# Patient Record
Sex: FEMALE | Employment: OTHER | URBAN - METROPOLITAN AREA
[De-identification: names, ages, dates, MRNs, and addresses within clinical notes are randomized per-mention and may not be internally consistent; named-entity substitution may affect disease eponyms.]

---

## 2017-01-02 NOTE — PROGRESS NOTES
"  SUBJECTIVE:                                                    Brianna Abdalla is a 36 year old female who presents to clinic today for the following health issues:      ED/UC Followup:    Facility:  [facility]   Date of visit: 12/1/16  Reason for visit: headache   Current Status: still having headaches on and off      Pain located in bilateral temple area and across forehead and sometimes radiates to neck.  Pain is throbbing in nature.   Pt did have nausea with headache on thanksgiving. No vomiting  No prodromal symptoms  Does have sound sensitivity  Takes excedrin as that is the only thing that helps    Pt has had headaches off and on but getting more severe since autumn  Now getting headaches 2x per week for the severe ones and 4 x per week for the mild ones.    Pt under a lot of stress. Anxiety is high. Finds her mood fluctuating all over. Wondering if stress is causing a lot of her headaches. Initially resistant to medication. Had tried lexapro in the past but it made her very sedated. Daughter on prozac and tolerating it well. Pt agreeable to therapy at least for short term. Finds difficulty in finding the time to do therapy.       Will treat anxiety with meds and therapy first and see if headaches improve as anxiety improves.  Pt did have CT scan of head in ER within the last month so not worrisome findings as far as cause for headaches      Problem list and histories reviewed & adjusted, as indicated.  Additional history: as documented    Problem list, Medication list, Allergies, and Medical/Social/Surgical histories reviewed in [facility] and updated as appropriate.    ROS:  Constitutional, HEENT, cardiovascular, pulmonary, gi and gu systems are negative, except as otherwise noted.    OBJECTIVE:                                                    /68 mmHg  Pulse 60  Temp(Src) 96.6  F (35.9  C) (Oral)  Ht 5' 2\" (1.575 m)  Wt 125 lb (56.7 kg)  BMI 22.86 kg/m2  Body mass index is 22.86 kg/(m^2).  GENERAL: " healthy, alert and no distress  NECK: no adenopathy, no asymmetry, masses, or scars and thyroid normal to palpation  RESP: lungs clear to auscultation - no rales, rhonchi or wheezes  CV: regular rate and rhythm, normal S1 S2, no S3 or S4, no murmur, click or rub, no peripheral edema and peripheral pulses strong  ABDOMEN: soft, nontender, no hepatosplenomegaly, no masses and bowel sounds normal  MS: no gross musculoskeletal defects noted, no edema    Diagnostic Test Results:  none      ASSESSMENT/PLAN:                                                            1. Adjustment disorder with anxious mood  As above, is seeing therapist now. Fu in one month  - FLUoxetine (PROZAC) 10 MG capsule; Take 1 capsule (10 mg) by mouth daily  Dispense: 30 capsule; Refill: 1    2. Bilateral headache  Reassess in one month when anxiety/stress hopefully improved. Sooner if not improving/worsening          Kati Briones MD  [facility]

## 2017-01-03 ENCOUNTER — OFFICE VISIT (OUTPATIENT)
Dept: BEHAVIORAL HEALTH | Facility: CLINIC | Age: 37
End: 2017-01-03
Payer: COMMERCIAL

## 2017-01-03 ENCOUNTER — OFFICE VISIT (OUTPATIENT)
Dept: FAMILY MEDICINE | Facility: CLINIC | Age: 37
End: 2017-01-03
Payer: COMMERCIAL

## 2017-01-03 VITALS
HEART RATE: 60 BPM | BODY MASS INDEX: 23 KG/M2 | TEMPERATURE: 96.6 F | DIASTOLIC BLOOD PRESSURE: 68 MMHG | WEIGHT: 125 LBS | SYSTOLIC BLOOD PRESSURE: 111 MMHG | HEIGHT: 62 IN

## 2017-01-03 DIAGNOSIS — R51.9 BILATERAL HEADACHE: ICD-10-CM

## 2017-01-03 DIAGNOSIS — F43.22 ADJUSTMENT DISORDER WITH ANXIOUS MOOD: Primary | ICD-10-CM

## 2017-01-03 DIAGNOSIS — F43.22 ADJUSTMENT DISORDER WITH ANXIOUS MOOD: ICD-10-CM

## 2017-01-03 PROCEDURE — 99207 ZZC NO CHARGE BEHAVIORAL WARM HANDOFF: CPT | Performed by: MARRIAGE & FAMILY THERAPIST

## 2017-01-03 PROCEDURE — 99214 OFFICE O/P EST MOD 30 MIN: CPT | Performed by: FAMILY MEDICINE

## 2017-01-03 RX ORDER — FLUOXETINE 10 MG/1
10 CAPSULE ORAL DAILY
Qty: 30 CAPSULE | Refills: 1 | Status: SHIPPED | OUTPATIENT
Start: 2017-01-03 | End: 2017-04-20

## 2017-01-03 ASSESSMENT — ANXIETY QUESTIONNAIRES
6. BECOMING EASILY ANNOYED OR IRRITABLE: NEARLY EVERY DAY
5. BEING SO RESTLESS THAT IT IS HARD TO SIT STILL: MORE THAN HALF THE DAYS
3. WORRYING TOO MUCH ABOUT DIFFERENT THINGS: NEARLY EVERY DAY
1. FEELING NERVOUS, ANXIOUS, OR ON EDGE: MORE THAN HALF THE DAYS
GAD7 TOTAL SCORE: 19
2. NOT BEING ABLE TO STOP OR CONTROL WORRYING: NEARLY EVERY DAY
IF YOU CHECKED OFF ANY PROBLEMS ON THIS QUESTIONNAIRE, HOW DIFFICULT HAVE THESE PROBLEMS MADE IT FOR YOU TO DO YOUR WORK, TAKE CARE OF THINGS AT HOME, OR GET ALONG WITH OTHER PEOPLE: VERY DIFFICULT
7. FEELING AFRAID AS IF SOMETHING AWFUL MIGHT HAPPEN: NEARLY EVERY DAY

## 2017-01-03 ASSESSMENT — PATIENT HEALTH QUESTIONNAIRE - PHQ9: 5. POOR APPETITE OR OVEREATING: NEARLY EVERY DAY

## 2017-01-03 NOTE — NURSING NOTE
"Chief Complaint   Patient presents with     Hospital F/U     [facility]       Initial /68 mmHg  Pulse 60  Temp(Src) 96.6  F (35.9  C) (Oral)  Ht 5' 2\" (1.575 m)  Wt 125 lb (56.7 kg)  BMI 22.86 kg/m2 Estimated body mass index is 22.86 kg/(m^2) as calculated from the following:    Height as of this encounter: 5' 2\" (1.575 m).    Weight as of this encounter: 125 lb (56.7 kg).  BP completed using cuff size: malvin Bermudez MA      "

## 2017-01-03 NOTE — PROGRESS NOTES
Warm-handoff    BEHAVIORAL HEALTH CLINICIAN introduced and explained integrated health model, brief therapy interventions and referral and support services for ongoing therapy interventions.   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 History of eating disorder, treated. Oldest daughter was treated this year for eating disorder,  has not been supportive or understanding.

## 2017-01-04 ASSESSMENT — ANXIETY QUESTIONNAIRES: GAD7 TOTAL SCORE: 19

## 2017-01-04 ASSESSMENT — PATIENT HEALTH QUESTIONNAIRE - PHQ9: SUM OF ALL RESPONSES TO PHQ QUESTIONS 1-9: 15

## 2017-01-17 ENCOUNTER — OFFICE VISIT (OUTPATIENT)
Dept: BEHAVIORAL HEALTH | Facility: CLINIC | Age: 37
End: 2017-01-17
Payer: COMMERCIAL

## 2017-01-17 DIAGNOSIS — F41.1 GAD (GENERALIZED ANXIETY DISORDER): ICD-10-CM

## 2017-01-17 DIAGNOSIS — F33.1 MAJOR DEPRESSIVE DISORDER, RECURRENT EPISODE, MODERATE (H): Primary | ICD-10-CM

## 2017-01-17 PROCEDURE — 90791 PSYCH DIAGNOSTIC EVALUATION: CPT | Performed by: MARRIAGE & FAMILY THERAPIST

## 2017-01-17 ASSESSMENT — ANXIETY QUESTIONNAIRES
3. WORRYING TOO MUCH ABOUT DIFFERENT THINGS: MORE THAN HALF THE DAYS
7. FEELING AFRAID AS IF SOMETHING AWFUL MIGHT HAPPEN: MORE THAN HALF THE DAYS
5. BEING SO RESTLESS THAT IT IS HARD TO SIT STILL: SEVERAL DAYS
2. NOT BEING ABLE TO STOP OR CONTROL WORRYING: MORE THAN HALF THE DAYS
6. BECOMING EASILY ANNOYED OR IRRITABLE: MORE THAN HALF THE DAYS
GAD7 TOTAL SCORE: 13
1. FEELING NERVOUS, ANXIOUS, OR ON EDGE: MORE THAN HALF THE DAYS

## 2017-01-17 ASSESSMENT — PATIENT HEALTH QUESTIONNAIRE - PHQ9: 5. POOR APPETITE OR OVEREATING: MORE THAN HALF THE DAYS

## 2017-01-30 RX ORDER — FLUOXETINE 10 MG/1
10 CAPSULE ORAL DAILY
Qty: 90 CAPSULE | Refills: 1 | Status: CANCELLED | OUTPATIENT
Start: 2017-01-30

## 2017-01-31 ENCOUNTER — OFFICE VISIT (OUTPATIENT)
Dept: BEHAVIORAL HEALTH | Facility: CLINIC | Age: 37
End: 2017-01-31
Payer: COMMERCIAL

## 2017-01-31 DIAGNOSIS — F41.1 GAD (GENERALIZED ANXIETY DISORDER): Primary | ICD-10-CM

## 2017-01-31 DIAGNOSIS — F33.1 MAJOR DEPRESSIVE DISORDER, RECURRENT EPISODE, MODERATE (H): ICD-10-CM

## 2017-01-31 PROBLEM — F43.22 ADJUSTMENT DISORDER WITH ANXIOUS MOOD: Status: RESOLVED | Noted: 2017-01-03 | Resolved: 2017-01-31

## 2017-01-31 PROCEDURE — 90834 PSYTX W PT 45 MINUTES: CPT | Performed by: MARRIAGE & FAMILY THERAPIST
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
## 2017-02-01 ASSESSMENT — PATIENT HEALTH QUESTIONNAIRE - PHQ9: SUM OF ALL RESPONSES TO PHQ QUESTIONS 1-9: 9

## 2017-02-01 ASSESSMENT — ANXIETY QUESTIONNAIRES: GAD7 TOTAL SCORE: 13

## 2017-02-02 ENCOUNTER — OFFICE VISIT (OUTPATIENT)
Dept: FAMILY MEDICINE | Facility: CLINIC | Age: 37
End: 2017-02-02
Payer: COMMERCIAL

## 2017-02-02 VITALS
TEMPERATURE: 98 F | DIASTOLIC BLOOD PRESSURE: 63 MMHG | HEART RATE: 61 BPM | BODY MASS INDEX: 22.26 KG/M2 | HEIGHT: 62 IN | WEIGHT: 121 LBS | SYSTOLIC BLOOD PRESSURE: 104 MMHG

## 2017-02-02 DIAGNOSIS — F43.22 ADJUSTMENT DISORDER WITH ANXIOUS MOOD: Primary | ICD-10-CM

## 2017-02-02 DIAGNOSIS — G44.209 TENSION HEADACHE: ICD-10-CM

## 2017-02-02 PROCEDURE — 99214 OFFICE O/P EST MOD 30 MIN: CPT | Performed by: FAMILY MEDICINE

## 2017-02-02 ASSESSMENT — ANXIETY QUESTIONNAIRES
1. FEELING NERVOUS, ANXIOUS, OR ON EDGE: MORE THAN HALF THE DAYS
IF YOU CHECKED OFF ANY PROBLEMS ON THIS QUESTIONNAIRE, HOW DIFFICULT HAVE THESE PROBLEMS MADE IT FOR YOU TO DO YOUR WORK, TAKE CARE OF THINGS AT HOME, OR GET ALONG WITH OTHER PEOPLE: SOMEWHAT DIFFICULT
5. BEING SO RESTLESS THAT IT IS HARD TO SIT STILL: MORE THAN HALF THE DAYS
3. WORRYING TOO MUCH ABOUT DIFFERENT THINGS: MORE THAN HALF THE DAYS
6. BECOMING EASILY ANNOYED OR IRRITABLE: MORE THAN HALF THE DAYS
GAD7 TOTAL SCORE: 13
7. FEELING AFRAID AS IF SOMETHING AWFUL MIGHT HAPPEN: SEVERAL DAYS
2. NOT BEING ABLE TO STOP OR CONTROL WORRYING: MORE THAN HALF THE DAYS

## 2017-02-02 ASSESSMENT — PATIENT HEALTH QUESTIONNAIRE - PHQ9: 5. POOR APPETITE OR OVEREATING: MORE THAN HALF THE DAYS

## 2017-02-02 NOTE — PROGRESS NOTES
[facility] [place]      Behavioral Health Clinician Progress Note    Patient Name: Brianna Abdalla           Service Type: Individual      Service Location:   Face to Face in Clinic     Session Start Time: 1:30  Session End Time: 2:20      Session Length: 38 - 52      Attendees: Patient    Visit Activities (Refresh list every visit): [facility] Only    Diagnostic Assessment Date: 1/17/17  Treatment Plan Review Date: By 3rd session   See Flowsheets for today's PHQ-9 and TONO-7 results  Previous PHQ-9:   PHQ-9 SCORE 1/3/2017 1/17/2017   Total Score 15 9     Previous TONO-7:   TONO-7 SCORE 1/3/2017 1/17/2017   Total Score 19 13       CORA LEVEL:  CORA Score (Last Two) 5/11/2011 1/17/2017   CORA Raw Score 39 37   Activation Score 56.4 79.2   CORA Level 3 4       DATA  Extended Session (60+ minutes): No  Interactive Complexity: No  Crisis: No    Treatment Objective(s) Addressed in This Session:  Target Behavior(s): disease management/lifestyle changes anxiety and depression    Depressed Mood: Increase interest, engagement, and pleasure in doing things  Decrease frequency and intensity of feeling down, depressed, hopeless  Improve quantity and quality of night time sleep / decrease daytime naps  Feel less tired and more energy during the day   Improve diet, appetite, mindful eating, and / or meal planning  Identify negative self-talk and behaviors: challenge core beliefs, myths, and actions  Improve concentration, focus, and mindfulness in daily activities   Feel less fidgety, restless or slow in daily activities / interpersonal interactions  Anxiety: will experience a reduction in anxiety, will develop more effective coping skills to manage anxiety symptoms, will develop healthy cognitive patterns and beliefs and will increase ability to function adaptively    Current Stressors / Issues:  Patient reports continued feelings of being overwhelmed. Oldest daughter's 18 birthday this week, and she  "got a card from her paternal grandmother who has not been present in her life. Patient reports she worries about grandmother's motives to reconnect and worries daughter's father may try to reconnect as well. Patient processed balancing her own responses and worries with her daughter having the power to determine how she wants to set these boundaries with paternal relatives and father. Patient processed theme of feeling placed in the middle \"referee\" for her younger daughter and father (patient's fiance). Patient reports her boyfriend/fiance struggles with responding in a deescalating manner to their teenage daughter. Patient reports fiance will often times drop off daughter and his parents house instead of spending time with her; patient feels like parenting is a chore to him.         Progress on Treatment Objective(s) / Homework:  New Objective established this session - ACTION (Actively working towards change); Intervened by reinforcing change plan / affirming steps taken    Also provided psychoeducation about behavioral health condition, symptoms, and treatment options   Patient declined [facility] enrollment.     Cognitive Behavioral Therapy   -Reviewed cognitive re-frames and Practiced balanced thoughts exercise   -Practiced deep breathing/visual imagery exercise   Care Plan review completed: Yes    Medication Review:  No changes to current psychiatric medication(s)    Medication Compliance:  Yes    Changes in Health Issues:   None reported    Chemical Use Review:   Substance Use: Chemical use reviewed, no active concerns identified      Tobacco Use: No current tobacco use.      Assessment: Current Emotional / Mental Status (status of significant symptoms):  Risk status (Self / Other harm or suicidal ideation)  Patient denies a history of suicidal ideation, suicide attempts, self-injurious behavior, homicidal ideation, homicidal behavior and and other safety concerns  Patient denies current fears or concerns for " personal safety.  Patient denies current or recent suicidal ideation or behaviors.  Patient denies current or recent homicidal ideation or behaviors.  Patient denies current or recent self injurious behavior or ideation.  Patient denies other safety concerns.  A safety and risk management plan has not been developed at this time, however patient was encouraged to call [place] [phone] should there be a change in any of these risk factors.    Appearance:   Appropriate   Eye Contact:   Good   Psychomotor Behavior: Normal   Attitude:   Cooperative   Orientation:   All  Speech   Rate / Production: Normal    Volume:  Normal   Mood:    Anxious   Affect:    Worrisome   Thought Content:  Rumination   Thought Form:  Coherent  Logical   Insight:    Good     Diagnoses:  1. TONO (generalized anxiety disorder)    2. Major depressive disorder, recurrent episode, moderate (H)        Collateral Reports Completed:  Not Applicable    Plan: (Homework, other):  Patient was given information about behavioral services and encouraged to schedule a follow up appointment with the clinic [facility] [place] in 1 week.  She was also given Cognitive Behavioral Therapy skills to practice when experiencing anxiety and deep Breathing Strategies to practice when experiencing anxiety.  CD Recommendations: No indications of CD issues.  JAN Nieves, [facility]

## 2017-02-02 NOTE — PROGRESS NOTES
"  SUBJECTIVE:                                                    Brianna Abdalla is a 36 year old female who presents to clinic today for the following health issues:        Depression and Anxiety Follow-Up    Status since last visit: Improved     Other associated symptoms:None    Complicating factors:     Significant life event: No     Current substance abuse: None    PHQ-9 SCORE 1/3/2017 1/17/2017   Total Score 15 9     TONO-7 SCORE 1/3/2017 1/17/2017   Total Score 19 13        PHQ-9  English      PHQ-9   Any Language     GAD7         Amount of exercise or physical activity: 3 days per week     Problems taking medications regularly: No    Medication side effects: none    Diet: regular (no restrictions)    Pt with anxiety. Now on prozac almost one month. Finds she went from up to 2 panic attacks per day to 2 in the past month. TONO 7 went form 15 down to 8.   She is not sure if she wants to stay on prozac at 10 mg or bump up to 20. Did send rx for 20 but will finish out the 10 mg tablets first.  Has slight side effect of nausea but not too bothersome.     Her headaches are still present but much improved. She exercise 2-3 times per week and finds this helps with her anxiety and her headaches.   She will follow up if the headaches do not resolve     Problem list and histories reviewed & adjusted, as indicated.  Additional history: as documented    Problem list, Medication list, Allergies, and Medical/Social/Surgical histories reviewed in [facility] and updated as appropriate.    ROS:  Constitutional, HEENT, cardiovascular, pulmonary, gi and gu systems are negative, except as otherwise noted.    OBJECTIVE:                                                    /63 mmHg  Pulse 61  Temp(Src) 98  F (36.7  C) (Oral)  Ht 5' 2\" (1.575 m)  Wt 121 lb (54.885 kg)  BMI 22.13 kg/m2  Body mass index is 22.13 kg/(m^2).  GENERAL: healthy, alert and no distress  RESP: lungs clear to auscultation - no rales, rhonchi or wheezes  CV: " regular rate and rhythm, normal S1 S2, no S3 or S4, no murmur, click or rub, no peripheral edema and peripheral pulses strong  MS: no gross musculoskeletal defects noted, no edema    Diagnostic Test Results:  none      ASSESSMENT/PLAN:                                                            1. Adjustment disorder with anxious mood  As above, increase porzac to 20, fu via mychart in one month  - FLUoxetine (PROZAC) 20 MG capsule; Take 1 capsule (20 mg) by mouth daily  Dispense: 30 capsule; Refill: 3    2. Tension headache  Improved with improvement of anxiety and with exercise, monitor for further resolution          Kati Briones MD  [facility]

## 2017-02-02 NOTE — NURSING NOTE
"Chief Complaint   Patient presents with     Depression       Initial /63 mmHg  Pulse 61  Temp(Src) 98  F (36.7  C) (Oral)  Ht 5' 2\" (1.575 m)  Wt 121 lb (54.885 kg)  BMI 22.13 kg/m2 Estimated body mass index is 22.13 kg/(m^2) as calculated from the following:    Height as of this encounter: 5' 2\" (1.575 m).    Weight as of this encounter: 121 lb (54.885 kg).  BP completed using cuff size: malvin Bermudez MA      "

## 2017-02-03 ASSESSMENT — ANXIETY QUESTIONNAIRES: GAD7 TOTAL SCORE: 13

## 2017-02-03 ASSESSMENT — PATIENT HEALTH QUESTIONNAIRE - PHQ9: SUM OF ALL RESPONSES TO PHQ QUESTIONS 1-9: 8

## 2017-02-09 ENCOUNTER — OFFICE VISIT (OUTPATIENT)
Dept: BEHAVIORAL HEALTH | Facility: CLINIC | Age: 37
End: 2017-02-09
Payer: COMMERCIAL

## 2017-02-09 DIAGNOSIS — F41.1 GAD (GENERALIZED ANXIETY DISORDER): Primary | ICD-10-CM

## 2017-02-09 DIAGNOSIS — F33.1 MAJOR DEPRESSIVE DISORDER, RECURRENT EPISODE, MODERATE (H): ICD-10-CM

## 2017-02-09 PROCEDURE — 90834 PSYTX W PT 45 MINUTES: CPT | Performed by: MARRIAGE & FAMILY THERAPIST

## 2017-02-09 NOTE — PROGRESS NOTES
[facility]   February 9, 2017      Behavioral Health Clinician Progress Note    Patient Name: Brianna Abdalla           Service Type: Individual      Service Location:   Face to Face in Clinic     Session Start Time: 1:40  Session End Time: 2:10      Session Length: 38 - 52      Attendees: Patient    Visit Activities (Refresh list every visit): [facility] Only    Diagnostic Assessment Date: 1/17/17  Treatment Plan Review Date:   See Flowsheets for today's PHQ-9 and TONO-7 results  Previous PHQ-9:   PHQ-9 SCORE 1/3/2017 1/17/2017 2/2/2017   Total Score 15 9 8     Previous TONO-7:   TONO-7 SCORE 1/3/2017 1/17/2017 2/2/2017   Total Score 19 13 13       CORA LEVEL:  CORA Score (Last Two) 5/11/2011 1/17/2017   CORA Raw Score 39 37   Activation Score 56.4 79.2   CORA Level 3 4       DATA  Extended Session (60+ minutes): No  Interactive Complexity: No  Crisis: No    Treatment Objective(s) Addressed in This Session:  Target Behavior(s): disease management/lifestyle changes anxiety and depression    Depressed Mood: Increase interest, engagement, and pleasure in doing things  Decrease frequency and intensity of feeling down, depressed, hopeless  Improve quantity and quality of night time sleep / decrease daytime naps  Feel less tired and more energy during the day   Improve diet, appetite, mindful eating, and / or meal planning  Identify negative self-talk and behaviors: challenge core beliefs, myths, and actions  Improve concentration, focus, and mindfulness in daily activities   Feel less fidgety, restless or slow in daily activities / interpersonal interactions  Anxiety: will experience a reduction in anxiety, will develop more effective coping skills to manage anxiety symptoms, will develop healthy cognitive patterns and beliefs and will increase ability to function adaptively    Current Stressors / Issues:  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 Patient processed different parenting styles between her and fiance/boyfriend Greg. Patient reports Greg is more hands off and does not interact as much as she does with their daughter. Patient reports Greg and daughter have distant relationship due to this, and patient often times  Is placed in the  role between them.     Progress on Treatment Objective(s) / Homework:  New Objective established this session - ACTION (Actively working towards change); Intervened by reinforcing change plan / affirming steps taken    Also provided psychoeducation about behavioral health condition, symptoms, and treatment options   Patient declined [facility] enrollment.     Cognitive Behavioral Therapy   -Reviewed cognitive re-frames and Practiced balanced thoughts exercise   -Practiced deep breathing/visual imagery exercise   Care Plan review completed: Yes    Medication Review:  No changes to current psychiatric medication(s)    Medication Compliance:  Yes    Changes in Health Issues:   None reported    Chemical Use Review:   Substance Use: Chemical use reviewed, no active concerns identified      Tobacco Use: No current tobacco use.      Assessment: Current Emotional / Mental Status (status of significant symptoms):  Risk status (Self / Other harm or suicidal ideation)  Patient denies a history of suicidal ideation, suicide attempts, self-injurious behavior, homicidal ideation, homicidal behavior and and other safety concerns  Patient denies current fears or concerns for personal safety.  Patient denies current or recent suicidal ideation or behaviors.  Patient denies current or recent homicidal ideation or behaviors.  Patient denies current or recent self injurious behavior or ideation.  Patient denies other safety concerns.  A safety and risk management plan has not been developed at this time, however patient was encouraged to call [facility] should there be a change in any of  these risk factors.    Appearance:   Appropriate   Eye Contact:   Good   Psychomotor Behavior: Normal   Attitude:   Cooperative   Orientation:   All  Speech   Rate / Production: Normal    Volume:  Normal   Mood:    Anxious   Affect:    Worrisome   Thought Content:  Rumination   Thought Form:  Coherent  Logical   Insight:    Good     Diagnoses:  1. TONO (generalized anxiety disorder)    2. Major depressive disorder, recurrent episode, moderate (H)        Collateral Reports Completed:  Not Applicable    Plan: (Homework, other):  Patient was given information about behavioral services and encouraged to schedule a follow up appointment with the clinic [facility] in 1 week.  She was also given Cognitive Behavioral Therapy skills to practice when experiencing anxiety and deep Breathing Strategies to practice when experiencing anxiety.  CD Recommendations: No indications of CD issues.  Annie Jones, JAN, [facility] [place]                                                   Treatment Plan    Client's Name: Brianna Abdalla  YOB: 1980    Date: 2/9/17    DSM-V Diagnoses: 296.32 Major Depressive Disorder, Recurrent Episode, Moderate _ or 300.02 (F41.1) Generalized Anxiety Disorder  Psychosocial / Contextual Factors: None  WHODAS: 16    Referral / Collaboration:  Referral to another professional/service is not indicated at this time..    Anticipated number of session or this episode of care: 6-8      MeasurableTreatment Goal(s) related to diagnosis / functional impairment(s)  Goal 1: Client will decrease symptoms and incorporate healthy and effective coping strategies     I will know I've met my goal when feel less anxious and down.      Objective #A (Client Action)    Client will use thought-stopping strategy daily to reduce intrusive thoughts.  Status: Continued - Date(s):     Intervention(s)  Therapist will teach distraction skills. CBT- Mindfulness/Breathing exercises, Cogntive Re-frames, Negative Automtic Thoughts,  Balnaced Thinking Skills.    Objective #B  Client will identify at least 6 triggers for anxiety.  Status: Continued - Date(s):     Intervention(s)  Therapist will role-play effective communication skills  teach emotional recognition/identification. Emotional Regulation Skills and Healthy Expression of emotions.    Objective #C  Client will Increase interest, engagement, and pleasure in doing things  Decrease frequency and intensity of feeling down, depressed, hopeless  Identify negative self-talk and behaviors: challenge core beliefs, myths, and actions  Improve concentration, focus, and mindfulness in daily activities   Feel less fidgety, restless or slow in daily activities / interpersonal interactions.  Status: Continued - Date(s):     Intervention(s)  Therapist will role-play effective communication skills  teach about healthy boundaries. Assertiveness skills, and conflict resolution skills.        Client has reviewed and agreed to the above plan.      Annie Jones  February 8, 2017

## 2017-02-09 NOTE — MR AVS SNAPSHOT
After Visit Summary   2/9/2017    Brianna Abdalla    MRN: [id number]           Patient Information     Date Of Birth          1980        Visit Information        Provider Department      2/9/2017 1:30 PM Annie Jones [facility]        Today's Diagnoses     TONO (generalized anxiety disorder)    -  1    Major depressive disorder, recurrent episode, moderate (H)           Follow-ups after your visit        Your next 10 appointments already scheduled     Feb 23, 2017  9:00 AM CST   Return Visit with Annie Jones   [facility] ([facility]    [address] [address] [phone]              Who to contact     If you have questions or need follow up information about today's clinic visit or your schedule please contact [facility] directly at [phone].  Normal or non-critical lab and imaging results will be communicated to you by MyChart, letter or phone within 4 business days after the clinic has received the results. If you do not hear from us within 7 days, please contact the clinic through [employer] or phone. If you have a critical or abnormal lab result, we will notify you by phone as soon as possible.  Submit refill requests through [employer] or call your pharmacy and they will forward the refill request to us. Please allow 3 business days for your refill to be completed.          Additional Information About Your Visit        MyChart Information     [employer] gives you secure access to your electronic health record. If you see a primary care provider, you can also send messages to your care team and make appointments. If you have questions, please call your primary care clinic.  If you do not have a primary care provider, please call [phone] and they will assist you.        Care EveryWhere ID     This is your Care EveryWhere ID. This could be used by other organizations to access your [facility]  medical records  [id number]         Blood Pressure from Last 3 Encounters:   02/02/17 104/63   01/03/17 111/68   12/01/16 133/82    Weight from Last 3 Encounters:   02/02/17 54.9 kg (121 lb)   01/03/17 56.7 kg (125 lb)   12/01/16 55.5 kg (122 lb 6.4 oz)              Today, you had the following     No orders found for display       Primary Care Provider Office Phone # Fax #    Kati Alaniz [phone]       [facility] [address] [address] [address]        Thank you!     Thank you for choosing [facility]  for your care. Our goal is always to provide you with excellent care. Hearing back from our patients is one way we can continue to improve our services. Please take a few minutes to complete the written survey that you may receive in the mail after your visit with us. Thank you!             Your Updated Medication List - Protect others around you: Learn how to safely use, store and throw away your medicines at www.disposemymeds.org.          This list is accurate as of: 2/9/17 11:59 PM.  Always use your most recent med list.                   Brand Name Dispense Instructions for use    * FLUoxetine 10 MG capsule    PROzac    30 capsule    Take 1 capsule (10 mg) by mouth daily       * FLUoxetine 20 MG capsule    PROzac    30 capsule    Take 1 capsule (20 mg) by mouth daily       * Notice:  This list has 2 medication(s) that are the same as other medications prescribed for you. Read the directions carefully, and ask your doctor or other care provider to review them with you.

## 2017-02-16 ENCOUNTER — OFFICE VISIT (OUTPATIENT)
Dept: BEHAVIORAL HEALTH | Facility: CLINIC | Age: 37
End: 2017-02-16
Payer: COMMERCIAL

## 2017-02-16 DIAGNOSIS — F33.1 MAJOR DEPRESSIVE DISORDER, RECURRENT EPISODE, MODERATE (H): Primary | ICD-10-CM

## 2017-02-16 DIAGNOSIS — F41.1 GAD (GENERALIZED ANXIETY DISORDER): ICD-10-CM

## 2017-02-16 PROCEDURE — 90834 PSYTX W PT 45 MINUTES: CPT | Performed by: MARRIAGE & FAMILY THERAPIST

## 2017-02-23 ENCOUNTER — OFFICE VISIT (OUTPATIENT)
Dept: BEHAVIORAL HEALTH | Facility: CLINIC | Age: 37
End: 2017-02-23
Payer: COMMERCIAL

## 2017-02-23 DIAGNOSIS — F33.1 MAJOR DEPRESSIVE DISORDER, RECURRENT EPISODE, MODERATE (H): Primary | ICD-10-CM

## 2017-02-23 DIAGNOSIS — F41.1 GAD (GENERALIZED ANXIETY DISORDER): ICD-10-CM

## 2017-02-23 PROCEDURE — 90834 PSYTX W PT 45 MINUTES: CPT | Performed by: MARRIAGE & FAMILY THERAPIST

## 2017-02-23 NOTE — PROGRESS NOTES
[facility]   February 16, 2017      Behavioral Health Clinician Progress Note    Patient Name: Brianna Abdalla           Service Type: Individual      Service Location:   Face to Face in Clinic     Session Start Time: 11:00  Session End Time: 12:00      Session Length: 53 - 60      Attendees: Patient    Visit Activities (Refresh list every visit): [facility] Only    Diagnostic Assessment Date: 1/17/17  Treatment Plan Review Date: 2/9/17  See Flowsheets for today's PHQ-9 and TONO-7 results  Previous PHQ-9:   PHQ-9 SCORE 1/3/2017 1/17/2017 2/2/2017   Total Score 15 9 8     Previous TONO-7:   TONO-7 SCORE 1/3/2017 1/17/2017 2/2/2017   Total Score 19 13 13       CORA LEVEL:  CORA Score (Last Two) 5/11/2011 1/17/2017   CORA Raw Score 39 37   Activation Score 56.4 79.2   CORA Level 3 4       DATA  Extended Session (60+ minutes): No  Interactive Complexity: No  Crisis: No    Treatment Objective(s) Addressed in This Session:  Target Behavior(s): disease management/lifestyle changes anxiety and depression    Depressed Mood: Increase interest, engagement, and pleasure in doing things  Decrease frequency and intensity of feeling down, depressed, hopeless  Improve quantity and quality of night time sleep / decrease daytime naps  Feel less tired and more energy during the day   Improve diet, appetite, mindful eating, and / or meal planning  Identify negative self-talk and behaviors: challenge core beliefs, myths, and actions  Improve concentration, focus, and mindfulness in daily activities   Feel less fidgety, restless or slow in daily activities / interpersonal interactions  Anxiety: will experience a reduction in anxiety, will develop more effective coping skills to manage anxiety symptoms, will develop healthy cognitive patterns and beliefs and will increase ability to function adaptively    Current Stressors / Issues:  Patient reports decreased anxiety symptoms and decreased headaches. Patient reports she is  exercising 3-4 times per week. Patient reports she continues to take medication (Prozac). Patient reports she has noticed decreased worry and ruminating thoughts. Patient reports her oldest daughter Juana wants to do a road trip to [place] with her close friend, and patient is very worried about this and doesn't want daughter to go. Patient processed co-parenting dynamics with Greg. Patient reports Greg is disengaged and does not actively spend a lot of interactions with their daughter Monika. Patient reports last this week Monika became reactive and emotionally upset and said that her father doesn't like her and doesn't like spending time with her. Patient reports when she discussed this later with Greg, he dismissed it as daughter having an emotional outburst.           Progress on Treatment Objective(s) / Homework:  New Objective established this session - ACTION (Actively working towards change); Intervened by reinforcing change plan / affirming steps taken    Also provided psychoeducation about behavioral health condition, symptoms, and treatment options   Patient declined [facility] enrollment.     Cognitive Behavioral Therapy   -Reviewed cognitive re-frames and Practiced balanced thoughts exercise   -Reviewed conflict resolution strategies     Care Plan review completed: Yes    Medication Review:  No changes to current psychiatric medication(s)    Medication Compliance:  Yes    Changes in Health Issues:   None reported    Chemical Use Review:   Substance Use: Chemical use reviewed, no active concerns identified      Tobacco Use: No current tobacco use.      Assessment: Current Emotional / Mental Status (status of significant symptoms):  Risk status (Self / Other harm or suicidal ideation)  Patient denies a history of suicidal ideation, suicide attempts, self-injurious behavior, homicidal ideation, homicidal behavior and and other safety concerns  Patient denies current fears or concerns for personal  safety.  Patient denies current or recent suicidal ideation or behaviors.  Patient denies current or recent homicidal ideation or behaviors.  Patient denies current or recent self injurious behavior or ideation.  Patient denies other safety concerns.  A safety and risk management plan has not been developed at this time, however patient was encouraged to call [place] [phone] should there be a change in any of these risk factors.    Appearance:   Appropriate   Eye Contact:   Good   Psychomotor Behavior: Normal   Attitude:   Cooperative   Orientation:   All  Speech   Rate / Production: Normal    Volume:  Normal   Mood:    Anxious   Affect:    Worrisome   Thought Content:  Rumination   Thought Form:  Coherent  Logical   Insight:    Good     Diagnoses:  1. Major depressive disorder, recurrent episode, moderate (H)    2. TONO (generalized anxiety disorder)        Collateral Reports Completed:  Not Applicable    Plan: (Homework, other):  Patient was given information about behavioral services and encouraged to schedule a follow up appointment with the clinic [facility] in 1 week.  She was also given Cognitive Behavioral Therapy skills to practice when experiencing anxiety and deep Breathing Strategies to practice when experiencing anxiety.  CD Recommendations: No indications of CD issues.  JAN Nieves, [facility]                                                   Treatment Plan    Client's Name: Brianna Abdalla  YOB: 1980    Date: 2/9/17    DSM-V Diagnoses: 296.32 Major Depressive Disorder, Recurrent Episode, Moderate _ or 300.02 (F41.1) Generalized Anxiety Disorder  Psychosocial / Contextual Factors: None  WHODAS: 16    Referral / Collaboration:  Referral to another professional/service is not indicated at this time..    Anticipated number of session or this episode of care: 6-8      MeasurableTreatment Goal(s) related to diagnosis / functional impairment(s)  Goal 1: Client will decrease symptoms and  incorporate healthy and effective coping strategies     I will know I've met my goal when feel less anxious and down.      Objective #A (Client Action)    Client will use thought-stopping strategy daily to reduce intrusive thoughts.  Status: Continued - Date(s):     Intervention(s)  Therapist will teach distraction skills. CBT- Mindfulness/Breathing exercises, Cogntive Re-frames, Negative Automtic Thoughts, Balnaced Thinking Skills.    Objective #B  Client will identify at least 6 triggers for anxiety.  Status: Continued - Date(s):     Intervention(s)  Therapist will role-play effective communication skills  teach emotional recognition/identification. Emotional Regulation Skills and Healthy Expression of emotions.    Objective #C  Client will Increase interest, engagement, and pleasure in doing things  Decrease frequency and intensity of feeling down, depressed, hopeless  Identify negative self-talk and behaviors: challenge core beliefs, myths, and actions  Improve concentration, focus, and mindfulness in daily activities   Feel less fidgety, restless or slow in daily activities / interpersonal interactions.  Status: Continued - Date(s):     Intervention(s)  Therapist will role-play effective communication skills  teach about healthy boundaries. Assertiveness skills, and conflict resolution skills.        Client has reviewed and agreed to the above plan.      Annie Jones  February 8, 2017

## 2017-02-24 NOTE — PROGRESS NOTES
[facility] [place]   February 23 , 2017      Behavioral Health Clinician Progress Note    Patient Name: Brianna Abdalla           Service Type: Individual      Service Location:   Face to Face in Clinic     Session Start Time: 11:00  Session End Time: 12:00      Session Length: 53 - 60      Attendees: Patient    Visit Activities (Refresh list every visit): [facility] Only    Diagnostic Assessment Date: 1/17/17  Treatment Plan Review Date: 2/9/17  See Flowsheets for today's PHQ-9 and TONO-7 results  Previous PHQ-9:   PHQ-9 SCORE 1/3/2017 1/17/2017 2/2/2017   Total Score 15 9 8     Previous TONO-7:   TONO-7 SCORE 1/3/2017 1/17/2017 2/2/2017   Total Score 19 13 13       CORA LEVEL:  CORA Score (Last Two) 5/11/2011 1/17/2017   CORA Raw Score 39 37   Activation Score 56.4 79.2   CORA Level 3 4       DATA  Extended Session (60+ minutes): No  Interactive Complexity: No  Crisis: No    Treatment Objective(s) Addressed in This Session:  Target Behavior(s): disease management/lifestyle changes anxiety and depression    Depressed Mood: Increase interest, engagement, and pleasure in doing things  Decrease frequency and intensity of feeling down, depressed, hopeless  Improve quantity and quality of night time sleep / decrease daytime naps  Feel less tired and more energy during the day   Improve diet, appetite, mindful eating, and / or meal planning  Identify negative self-talk and behaviors: challenge core beliefs, myths, and actions  Improve concentration, focus, and mindfulness in daily activities   Feel less fidgety, restless or slow in daily activities / interpersonal interactions  Anxiety: will experience a reduction in anxiety, will develop more effective coping skills to manage anxiety symptoms, will develop healthy cognitive patterns and beliefs and will increase ability to function adaptively    Current Stressors / Issues:  Patient reports her daughter is still planning to go on road trip to [place] with her friend.  Patient reports she wants to plan a trip for her youngest daughter to [place] during her spring break, as she worries she might not give her enough attention with oldest daughter graduating high school this year. Patient processed uncertainty with her connection with Greg and the future she sees with him. 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          Progress on Treatment Objective(s) / Homework:  New Objective established this session - ACTION (Actively working towards change); Intervened by reinforcing change plan / affirming steps taken    Also provided psychoeducation about behavioral health condition, symptoms, and treatment options   Patient declined [facility] enrollment.     Cognitive Behavioral Therapy   -Sounds and visual imagery mindfulness and breathing exercises     Care Plan review completed: Yes    Medication Review:  No changes to current psychiatric medication(s)    Medication Compliance:  Yes    Changes in Health Issues:   None reported    Chemical Use Review:   Substance Use: Chemical use reviewed, no active concerns identified      Tobacco Use: No current tobacco use.      Assessment: Current Emotional / Mental Status (status of significant symptoms):  Risk status (Self / Other harm or suicidal ideation)  Patient denies a history of suicidal ideation, suicide attempts, self-injurious behavior, homicidal ideation, homicidal behavior and and other safety concerns  Patient denies current fears or concerns for personal safety.  Patient denies current or recent suicidal ideation or behaviors.  Patient denies current or recent homicidal ideation or behaviors.  Patient denies current or recent self injurious behavior or ideation.  Patient denies other safety concerns.  A safety and risk management plan has not been developed at this time, however patient was encouraged to call [facility] should there be a change in any of these risk  factors.    Appearance:   Appropriate   Eye Contact:   Good   Psychomotor Behavior: Normal   Attitude:   Cooperative   Orientation:   All  Speech   Rate / Production: Normal    Volume:  Normal   Mood:    Anxious   Affect:    Worrisome   Thought Content:  Rumination   Thought Form:  Coherent  Logical   Insight:    Good     Diagnoses:  1. Major depressive disorder, recurrent episode, moderate (H)    2. TONO (generalized anxiety disorder)        Collateral Reports Completed:  Not Applicable    Plan: (Homework, other):  Patient was given information about behavioral services and encouraged to schedule a follow up appointment with the clinic [facility] in 1 week.  She was also given Cognitive Behavioral Therapy skills to practice when experiencing anxiety and deep Breathing Strategies to practice when experiencing anxiety.  CD Recommendations: No indications of CD issues.  Annie Jones, JAN, [facility]                                                   Treatment Plan    Client's Name: Brianna Abdalla  YOB: 1980    Date: 2/9/17    DSM-V Diagnoses: 296.32 Major Depressive Disorder, Recurrent Episode, Moderate _ or 300.02 (F41.1) Generalized Anxiety Disorder  Psychosocial / Contextual Factors: None  WHODAS: 16    Referral / Collaboration:  Referral to another professional/service is not indicated at this time..    Anticipated number of session or this episode of care: 6-8      MeasurableTreatment Goal(s) related to diagnosis / functional impairment(s)  Goal 1: Client will decrease symptoms and incorporate healthy and effective coping strategies     I will know I've met my goal when feel less anxious and down.      Objective #A (Client Action)    Client will use thought-stopping strategy daily to reduce intrusive thoughts.  Status: Continued - Date(s):     Intervention(s)  Therapist will teach distraction skills. CBT- Mindfulness/Breathing exercises, Cogntive Re-frames, Negative Automtic Thoughts, Balnaced Thinking  Skills.    Objective #B  Client will identify at least 6 triggers for anxiety.  Status: Continued - Date(s):     Intervention(s)  Therapist will role-play effective communication skills  teach emotional recognition/identification. Emotional Regulation Skills and Healthy Expression of emotions.    Objective #C  Client will Increase interest, engagement, and pleasure in doing things  Decrease frequency and intensity of feeling down, depressed, hopeless  Identify negative self-talk and behaviors: challenge core beliefs, myths, and actions  Improve concentration, focus, and mindfulness in daily activities   Feel less fidgety, restless or slow in daily activities / interpersonal interactions.  Status: Continued - Date(s):     Intervention(s)  Therapist will role-play effective communication skills  teach about healthy boundaries. Assertiveness skills, and conflict resolution skills.        Client has reviewed and agreed to the above plan.      Annie Jones  February 8, 2017

## 2017-03-02 ENCOUNTER — TELEPHONE (OUTPATIENT)
Dept: FAMILY MEDICINE | Facility: CLINIC | Age: 37
End: 2017-03-02

## 2017-03-02 DIAGNOSIS — F41.1 GAD (GENERALIZED ANXIETY DISORDER): ICD-10-CM

## 2017-03-02 DIAGNOSIS — F33.1 MAJOR DEPRESSIVE DISORDER, RECURRENT EPISODE, MODERATE (H): Primary | ICD-10-CM

## 2017-03-02 RX ORDER — FLUOXETINE 10 MG/1
30 CAPSULE ORAL DAILY
Qty: 270 CAPSULE | Refills: 1 | Status: SHIPPED | OUTPATIENT
Start: 2017-03-02 | End: 2017-04-20

## 2017-03-02 NOTE — TELEPHONE ENCOUNTER
Patient is calling with questions about increasing dosage on RX: FLUoxetine (PROZAC) 20 MG capsule. Please call to advise. Thank  You.

## 2017-03-02 NOTE — TELEPHONE ENCOUNTER
Bumped her dose to 40 mg otherwise she is going to have 2 copays per month.  Rx has been sent    Kati Briones

## 2017-03-02 NOTE — TELEPHONE ENCOUNTER
Patient called back and informed her of providers message.   She said she just got a call from her pharmacy that her insurance will not cover Prozac at 3 tablets daily.    They will cover Prozac 20 mg 1 tablet daily with Prozac 10 mg daily but a PA will need to be done for the original order.     Routing to PCP to advise on whether PA should be started or the 2 strengths can be ordered.    Sherice Toledo RN

## 2017-03-02 NOTE — TELEPHONE ENCOUNTER
When did she increase it to 20 mg?  It takes about one month to kick in. If she just did in the past 2-3 weeks then I would have her hold tight at current dose.  If it has been closer to one month, okay to increase it to 30 mg and have her fu in one month    Kati Briones

## 2017-03-02 NOTE — TELEPHONE ENCOUNTER
rx sent. She can finish up the 20 mg tablets but taking a 20 and 10 til 20s are gone, then continue with 10 mg x3 tablets a day.    Kati Briones

## 2017-03-02 NOTE — TELEPHONE ENCOUNTER
Patient is currently taking 20mg daily. Patient now feeling need to increase due to increased stress and anxiety.  Has a very stressful situation coming up in about a week and a half.  Also patient states was discussed increasing the dose about a week and a half before menses. To provider to advise..Jo-Ann GUSMANN, RN, CPN

## 2017-03-02 NOTE — TELEPHONE ENCOUNTER
Patient has been on the 20mg since 2/2. Would like increase to 30mg. Please send prescription..Jo-Ann GUSMANN, RN, CPN

## 2017-03-02 NOTE — TELEPHONE ENCOUNTER
Patient informed of provider note as below and prescription sent to pharmacy..Jo-Ann GUSMANN, RN, CPN

## 2017-03-02 NOTE — TELEPHONE ENCOUNTER
Left message on voicemail for patient to call back.  Direct number given.     Sherice Toledo, RN

## 2017-03-09 ENCOUNTER — OFFICE VISIT (OUTPATIENT)
Dept: BEHAVIORAL HEALTH | Facility: CLINIC | Age: 37
End: 2017-03-09
Payer: COMMERCIAL

## 2017-03-09 DIAGNOSIS — F41.1 GAD (GENERALIZED ANXIETY DISORDER): Primary | ICD-10-CM

## 2017-03-09 DIAGNOSIS — F33.1 MAJOR DEPRESSIVE DISORDER, RECURRENT EPISODE, MODERATE (H): ICD-10-CM

## 2017-03-09 PROCEDURE — 90834 PSYTX W PT 45 MINUTES: CPT | Performed by: MARRIAGE & FAMILY THERAPIST

## 2017-03-09 NOTE — MR AVS SNAPSHOT
After Visit Summary   3/9/2017    Brianna Abdalla    MRN: [id number]           Patient Information     Date Of Birth          1980        Visit Information        Provider Department      3/9/2017 2:00 PM Annie Jones [facility]        Today's Diagnoses     TONO (generalized anxiety disorder)    -  1    Major depressive disorder, recurrent episode, moderate (H)           Follow-ups after your visit        Who to contact     If you have questions or need follow up information about today's clinic visit or your schedule please contact [facility] directly at [phone].  Normal or non-critical lab and imaging results will be communicated to you by Lightning Labhart, letter or phone within 4 business days after the clinic has received the results. If you do not hear from us within 7 days, please contact the clinic through Speakaboost or phone. If you have a critical or abnormal lab result, we will notify you by phone as soon as possible.  Submit refill requests through [employer] or call your pharmacy and they will forward the refill request to us. Please allow 3 business days for your refill to be completed.          Additional Information About Your Visit        MyChart Information     [employer] gives you secure access to your electronic health record. If you see a primary care provider, you can also send messages to your care team and make appointments. If you have questions, please call your primary care clinic.  If you do not have a primary care provider, please call [phone] and they will assist you.        Care EveryWhere ID     This is your Care EveryWhere ID. This could be used by other organizations to access your [place] medical records  [id number]         Blood Pressure from Last 3 Encounters:   02/02/17 104/63   01/03/17 111/68   12/01/16 133/82    Weight from Last 3 Encounters:   02/02/17 54.9 kg (121 lb)   01/03/17 56.7 kg (125 lb)   12/01/16 55.5 kg (122 lb  6.4 oz)              Today, you had the following     No orders found for display       Primary Care Provider Office Phone # Fax #    Kati Alaniz [phone]       [facility] [address] [address] [facility] [address]        Thank you!     Thank you for choosing [facility]  for your care. Our goal is always to provide you with excellent care. Hearing back from our patients is one way we can continue to improve our services. Please take a few minutes to complete the written survey that you may receive in the mail after your visit with us. Thank you!             Your Updated Medication List - Protect others around you: Learn how to safely use, store and throw away your medicines at www.disposemymeds.org.          This list is accurate as of: 3/9/17 11:59 PM.  Always use your most recent med list.                   Brand Name Dispense Instructions for use    * FLUoxetine 10 MG capsule    PROzac    30 capsule    Take 1 capsule (10 mg) by mouth daily       * FLUoxetine 20 MG capsule    PROzac    30 capsule    Take 1 capsule (20 mg) by mouth daily       * FLUoxetine 10 MG capsule    PROzac    270 capsule    Take 3 capsules (30 mg) by mouth daily       * FLUoxetine 20 MG capsule    PROzac    90 capsule    Take 2 capsules (40 mg) by mouth daily       * Notice:  This list has 4 medication(s) that are the same as other medications prescribed for you. Read the directions carefully, and ask your doctor or other care provider to review them with you.

## 2017-03-14 ENCOUNTER — OFFICE VISIT (OUTPATIENT)
Dept: BEHAVIORAL HEALTH | Facility: CLINIC | Age: 37
End: 2017-03-14
Payer: COMMERCIAL

## 2017-03-14 DIAGNOSIS — F33.1 MAJOR DEPRESSIVE DISORDER, RECURRENT EPISODE, MODERATE (H): ICD-10-CM

## 2017-03-14 DIAGNOSIS — F41.1 GAD (GENERALIZED ANXIETY DISORDER): Primary | ICD-10-CM

## 2017-03-14 PROCEDURE — 90834 PSYTX W PT 45 MINUTES: CPT | Performed by: MARRIAGE & FAMILY THERAPIST

## 2017-03-14 NOTE — MR AVS SNAPSHOT
After Visit Summary   3/14/2017    Brianna Abdalla    MRN: [id number]           Patient Information     Date Of Birth          1980        Visit Information        Provider Department      3/14/2017 10:00 AM Annie Jones [facility]        Today's Diagnoses     TONO (generalized anxiety disorder)    -  1    Major depressive disorder, recurrent episode, moderate (H)           Follow-ups after your visit        Who to contact     If you have questions or need follow up information about today's clinic visit or your schedule please contact [facility] directly at [phone].  Normal or non-critical lab and imaging results will be communicated to you by [employer] Powerhart, letter or phone within 4 business days after the clinic has received the results. If you do not hear from us within 7 days, please contact the clinic through [employer] or phone. If you have a critical or abnormal lab result, we will notify you by phone as soon as possible.  Submit refill requests through [employer] or call your pharmacy and they will forward the refill request to us. Please allow 3 business days for your refill to be completed.          Additional Information About Your Visit        MyChart Information     [employer] gives you secure access to your electronic health record. If you see a primary care provider, you can also send messages to your care team and make appointments. If you have questions, please call your primary care clinic.  If you do not have a primary care provider, please call [phone] and they will assist you.        Care EveryWhere ID     This is your Care EveryWhere ID. This could be used by other organizations to access your [place] medical records  [id number]         Blood Pressure from Last 3 Encounters:   02/02/17 104/63   01/03/17 111/68   12/01/16 133/82    Weight from Last 3 Encounters:   02/02/17 54.9 kg (121 lb)   01/03/17 56.7 kg (125 lb)   12/01/16 55.5 kg (122  lb 6.4 oz)              Today, you had the following     No orders found for display       Primary Care Provider Office Phone # Fax #    Kati Alaniz [phone]       [facility] [address] [address] [facility] [address]        Thank you!     Thank you for choosing [facility]  for your care. Our goal is always to provide you with excellent care. Hearing back from our patients is one way we can continue to improve our services. Please take a few minutes to complete the written survey that you may receive in the mail after your visit with us. Thank you!             Your Updated Medication List - Protect others around you: Learn how to safely use, store and throw away your medicines at www.disposemymeds.org.          This list is accurate as of: 3/14/17 11:59 PM.  Always use your most recent med list.                   Brand Name Dispense Instructions for use    * FLUoxetine 10 MG capsule    PROzac    30 capsule    Take 1 capsule (10 mg) by mouth daily       * FLUoxetine 20 MG capsule    PROzac    30 capsule    Take 1 capsule (20 mg) by mouth daily       * FLUoxetine 10 MG capsule    PROzac    270 capsule    Take 3 capsules (30 mg) by mouth daily       * FLUoxetine 20 MG capsule    PROzac    90 capsule    Take 2 capsules (40 mg) by mouth daily       * Notice:  This list has 4 medication(s) that are the same as other medications prescribed for you. Read the directions carefully, and ask your doctor or other care provider to review them with you.

## 2017-03-14 NOTE — PROGRESS NOTES
[facility] [place]   March 9 , 2017      Behavioral Health Clinician Progress Note    Patient Name: Brianna Abdalla           Service Type: Individual      Service Location:   Face to Face in Clinic     Session Start Time: 2:00  Session End Time: 3:00      Session Length: 53 - 60      Attendees: Patient    Visit Activities (Refresh list every visit): [facility] Only    Diagnostic Assessment Date: 1/17/17  Treatment Plan Review Date: 2/9/17  See Flowsheets for today's PHQ-9 and TONO-7 results  Previous PHQ-9:   PHQ-9 SCORE 1/3/2017 1/17/2017 2/2/2017   Total Score 15 9 8     Previous TONO-7:   TONO-7 SCORE 1/3/2017 1/17/2017 2/2/2017   Total Score 19 13 13       CORA LEVEL:  CORA Score (Last Two) 5/11/2011 1/17/2017   CORA Raw Score 39 37   Activation Score 56.4 79.2   CORA Level 3 4       DATA  Extended Session (60+ minutes): No  Interactive Complexity: No  Crisis: No    Treatment Objective(s) Addressed in This Session:  Target Behavior(s): disease management/lifestyle changes anxiety and depression    Depressed Mood: Increase interest, engagement, and pleasure in doing things  Decrease frequency and intensity of feeling down, depressed, hopeless  Improve quantity and quality of night time sleep / decrease daytime naps  Feel less tired and more energy during the day   Improve diet, appetite, mindful eating, and / or meal planning  Identify negative self-talk and behaviors: challenge core beliefs, myths, and actions  Improve concentration, focus, and mindfulness in daily activities   Feel less fidgety, restless or slow in daily activities / interpersonal interactions  Anxiety: will experience a reduction in anxiety, will develop more effective coping skills to manage anxiety symptoms, will develop healthy cognitive patterns and beliefs and will increase ability to function adaptively    Current Stressors / Issues:  Patient reports her oldest daughter left this morning to go on road trip to [place] with her friend.  Patient reports she planned a trip to [place] with her younger daughter for her spring break next week. Patient reports increased anxiety related to worrying about safety of her daughter on her road trip. 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 Patient processed emotional impacts of boyfriend Greg being disengaged with their family and patient not feeling they are connected as a couple. Patient reports Greg forgot that patient's daughter Juana had left this morning for her trip and did not offer any additional support or encouragement to patient as she was very anxious.           Progress on Treatment Objective(s) / Homework:  New Objective established this session - ACTION (Actively working towards change); Intervened by reinforcing change plan / affirming steps taken    Also provided psychoeducation about behavioral health condition, symptoms, and treatment options     Cognitive Behavioral Therapy   -Mindfulness imagery exercise, Cognitive re-frames     Care Plan review completed: Yes    Medication Review:  No changes to current psychiatric medication(s)    Medication Compliance:  Yes    Changes in Health Issues:   None reported    Chemical Use Review:   Substance Use: Chemical use reviewed, no active concerns identified      Tobacco Use: No current tobacco use.      Assessment: Current Emotional / Mental Status (status of significant symptoms):  Risk status (Self / Other harm or suicidal ideation)  Patient denies a history of suicidal ideation, suicide attempts, self-injurious behavior, homicidal ideation, homicidal behavior and and other safety concerns  Patient denies current fears or concerns for personal safety.  Patient denies current or recent suicidal ideation or behaviors.  Patient denies current or recent homicidal ideation or behaviors.  Patient denies current or recent self  injurious behavior or ideation.  Patient denies other safety concerns.  A safety and risk management plan has not been developed at this time, however patient was encouraged to call [place] [phone] should there be a change in any of these risk factors.    Appearance:   Appropriate   Eye Contact:   Good   Psychomotor Behavior: Normal   Attitude:   Cooperative   Orientation:   All  Speech   Rate / Production: Normal    Volume:  Normal   Mood:    Anxious   Affect:    Worrisome   Thought Content:  Rumination   Thought Form:  Coherent  Logical   Insight:    Good     Diagnoses:  1. TONO (generalized anxiety disorder)    2. Major depressive disorder, recurrent episode, moderate (H)        Collateral Reports Completed:  Not Applicable    Plan: (Homework, other):  Patient was given information about behavioral services and encouraged to schedule a follow up appointment with the clinic [facility] in 1 week.  She was also given Cognitive Behavioral Therapy skills to practice when experiencing anxiety and deep Breathing Strategies to practice when experiencing anxiety.  CD Recommendations: No indications of CD issues.  JAN Nieves, [facility]                                                   Treatment Plan    Client's Name: Brianna Abdalla  YOB: 1980    Date: 2/9/17    DSM-V Diagnoses: 296.32 Major Depressive Disorder, Recurrent Episode, Moderate _ or 300.02 (F41.1) Generalized Anxiety Disorder  Psychosocial / Contextual Factors: None  WHODAS: 16    Referral / Collaboration:  Referral to another professional/service is not indicated at this time..    Anticipated number of session or this episode of care: 6-8      MeasurableTreatment Goal(s) related to diagnosis / functional impairment(s)  Goal 1: Client will decrease symptoms and incorporate healthy and effective coping strategies     I will know I've met my goal when feel less anxious and down.      Objective #A (Client Action)    Client will use  thought-stopping strategy daily to reduce intrusive thoughts.  Status: Continued - Date(s):     Intervention(s)  Therapist will teach distraction skills. CBT- Mindfulness/Breathing exercises, Cogntive Re-frames, Negative Automtic Thoughts, Balnaced Thinking Skills.    Objective #B  Client will identify at least 6 triggers for anxiety.  Status: Continued - Date(s):     Intervention(s)  Therapist will role-play effective communication skills  teach emotional recognition/identification. Emotional Regulation Skills and Healthy Expression of emotions.    Objective #C  Client will Increase interest, engagement, and pleasure in doing things  Decrease frequency and intensity of feeling down, depressed, hopeless  Identify negative self-talk and behaviors: challenge core beliefs, myths, and actions  Improve concentration, focus, and mindfulness in daily activities   Feel less fidgety, restless or slow in daily activities / interpersonal interactions.  Status: Continued - Date(s):     Intervention(s)  Therapist will role-play effective communication skills  teach about healthy boundaries. Assertiveness skills, and conflict resolution skills.        Client has reviewed and agreed to the above plan.      Annie Jones  February 8, 2017

## 2017-03-16 NOTE — PROGRESS NOTES
[facility]   March 14 , 2017      Behavioral Health Clinician Progress Note    Patient Name: Brianna Abdalla           Service Type: Individual      Service Location:   Face to Face in Clinic     Session Start Time: 10:00  Session End Time: 11:00      Session Length: 53 - 60      Attendees: Patient    Visit Activities (Refresh list every visit): [facility] Only    Diagnostic Assessment Date: 1/17/17  Treatment Plan Review Date: 2/9/17  See Flowsheets for today's PHQ-9 and TONO-7 results  Previous PHQ-9:   PHQ-9 SCORE 1/3/2017 1/17/2017 2/2/2017   Total Score 15 9 8     Previous TONO-7:   TONO-7 SCORE 1/3/2017 1/17/2017 2/2/2017   Total Score 19 13 13       CORA LEVEL:  CORA Score (Last Two) 5/11/2011 1/17/2017   CORA Raw Score 39 37   Activation Score 56.4 79.2   CORA Level 3 4       DATA  Extended Session (60+ minutes): No  Interactive Complexity: No  Crisis: No    Treatment Objective(s) Addressed in This Session:  Target Behavior(s): disease management/lifestyle changes anxiety and depression    Depressed Mood: Increase interest, engagement, and pleasure in doing things  Decrease frequency and intensity of feeling down, depressed, hopeless  Improve quantity and quality of night time sleep / decrease daytime naps  Feel less tired and more energy during the day   Improve diet, appetite, mindful eating, and / or meal planning  Identify negative self-talk and behaviors: challenge core beliefs, myths, and actions  Improve concentration, focus, and mindfulness in daily activities   Feel less fidgety, restless or slow in daily activities / interpersonal interactions  Anxiety: will experience a reduction in anxiety, will develop more effective coping skills to manage anxiety symptoms, will develop healthy cognitive patterns and beliefs and will increase ability to function adaptively    Current Stressors / Issues:  Patient reports daughter is out of town and patient continues to cope with her anxiety.  Patient reports she was able to sleep and get through the night and into the next day without hearing back from daughter. Patient reports she and younger daughter leave for [place] later today. Patient reports Greg informed her he took time off from work while they are gone, which bothered and confused patient as she had asked him to join them but he told her he didn't have enough time off from work. Patient processed ongoing disconnect with Greg and her worry about the future of their relationship. Patient processed relationship and interactions with co-worker who she is also friends with.         Progress on Treatment Objective(s) / Homework:  New Objective established this session - ACTION (Actively working towards change); Intervened by reinforcing change plan / affirming steps taken    Also provided psychoeducation about behavioral health condition, symptoms, and treatment options     Cognitive Behavioral Therapy   -Establishing healthy boundaries with friends and co-workers  -Mindfulness and distraction exercises   -Cognitive re-frames and recognizing cognitive distortions     Care Plan review completed: Yes    Medication Review:  No changes to current psychiatric medication(s)    Medication Compliance:  Yes    Changes in Health Issues:   None reported    Chemical Use Review:   Substance Use: Chemical use reviewed, no active concerns identified      Tobacco Use: No current tobacco use.      Assessment: Current Emotional / Mental Status (status of significant symptoms):  Risk status (Self / Other harm or suicidal ideation)  Patient denies a history of suicidal ideation, suicide attempts, self-injurious behavior, homicidal ideation, homicidal behavior and and other safety concerns  Patient denies current fears or concerns for personal safety.  Patient denies current or recent suicidal ideation or behaviors.  Patient denies current or recent homicidal ideation or behaviors.  Patient denies current or recent  self injurious behavior or ideation.  Patient denies other safety concerns.  A safety and risk management plan has not been developed at this time, however patient was encouraged to call [place] [phone] should there be a change in any of these risk factors.    Appearance:   Appropriate   Eye Contact:   Good   Psychomotor Behavior: Normal   Attitude:   Cooperative   Orientation:   All  Speech   Rate / Production: Normal    Volume:  Normal   Mood:    Anxious   Affect:    Worrisome   Thought Content:  Rumination   Thought Form:  Coherent  Logical   Insight:    Good     Diagnoses:  1. TONO (generalized anxiety disorder)    2. Major depressive disorder, recurrent episode, moderate (H)        Collateral Reports Completed:  Not Applicable    Plan: (Homework, other):  Patient was given information about behavioral services and encouraged to schedule a follow up appointment with the clinic [facility] in 1 week.  She was also given Cognitive Behavioral Therapy skills to practice when experiencing anxiety and deep Breathing Strategies to practice when experiencing anxiety.  CD Recommendations: No indications of CD issues.  JAN Nieves, [facility]                                                   Treatment Plan    Client's Name: Brianna Abdalla  YOB: 1980    Date: 2/9/17    DSM-V Diagnoses: 296.32 Major Depressive Disorder, Recurrent Episode, Moderate _ or 300.02 (F41.1) Generalized Anxiety Disorder  Psychosocial / Contextual Factors: None  WHODAS: 16    Referral / Collaboration:  Referral to another professional/service is not indicated at this time..    Anticipated number of session or this episode of care: 6-8      MeasurableTreatment Goal(s) related to diagnosis / functional impairment(s)  Goal 1: Client will decrease symptoms and incorporate healthy and effective coping strategies     I will know I've met my goal when feel less anxious and down.      Objective #A (Client Action)    Client will use  thought-stopping strategy daily to reduce intrusive thoughts.  Status: Continued - Date(s):     Intervention(s)  Therapist will teach distraction skills. CBT- Mindfulness/Breathing exercises, Cogntive Re-frames, Negative Automtic Thoughts, Balnaced Thinking Skills.    Objective #B  Client will identify at least 6 triggers for anxiety.  Status: Continued - Date(s):     Intervention(s)  Therapist will role-play effective communication skills  teach emotional recognition/identification. Emotional Regulation Skills and Healthy Expression of emotions.    Objective #C  Client will Increase interest, engagement, and pleasure in doing things  Decrease frequency and intensity of feeling down, depressed, hopeless  Identify negative self-talk and behaviors: challenge core beliefs, myths, and actions  Improve concentration, focus, and mindfulness in daily activities   Feel less fidgety, restless or slow in daily activities / interpersonal interactions.  Status: Continued - Date(s):     Intervention(s)  Therapist will role-play effective communication skills  teach about healthy boundaries. Assertiveness skills, and conflict resolution skills.        Client has reviewed and agreed to the above plan.      Annie Jones  February 8, 2017

## 2017-04-14 ENCOUNTER — OFFICE VISIT (OUTPATIENT)
Dept: BEHAVIORAL HEALTH | Facility: CLINIC | Age: 37
End: 2017-04-14
Payer: COMMERCIAL

## 2017-04-14 DIAGNOSIS — F33.1 MAJOR DEPRESSIVE DISORDER, RECURRENT EPISODE, MODERATE (H): Primary | ICD-10-CM

## 2017-04-14 DIAGNOSIS — F41.1 GAD (GENERALIZED ANXIETY DISORDER): ICD-10-CM

## 2017-04-14 PROCEDURE — 90834 PSYTX W PT 45 MINUTES: CPT | Performed by: MARRIAGE & FAMILY THERAPIST

## 2017-04-14 NOTE — MR AVS SNAPSHOT
After Visit Summary   4/14/2017    Brianna Abdalla    MRN: [id number]           Patient Information     Date Of Birth          1980        Visit Information        Provider Department      4/14/2017 1:30 PM Annie Jones [facility]        Today's Diagnoses     Major depressive disorder, recurrent episode, moderate (H)    -  1    TONO (generalized anxiety disorder)           Follow-ups after your visit        Your next 10 appointments already scheduled     May 02, 2017  4:00 PM CDT   Return Visit with Annie Jones   [facility]    [address] [address] [phone]              Who to contact     If you have questions or need follow up information about today's clinic visit or your schedule please contact [facility] directly at [phone].  Normal or non-critical lab and imaging results will be communicated to you by MyChart, letter or phone within 4 business days after the clinic has received the results. If you do not hear from us within 7 days, please contact the clinic through [employer] or phone. If you have a critical or abnormal lab result, we will notify you by phone as soon as possible.  Submit refill requests through [employer] or call your pharmacy and they will forward the refill request to us. Please allow 3 business days for your refill to be completed.          Additional Information About Your Visit        MyChart Information     [employer] gives you secure access to your electronic health record. If you see a primary care provider, you can also send messages to your care team and make appointments. If you have questions, please call your primary care clinic.  If you do not have a primary care provider, please call [phone] and they will assist you.        Care EveryWhere ID     This is your Care EveryWhere ID. This could be used by other organizations to access your  [place] medical records  [id number]         Blood Pressure from Last 3 Encounters:   04/20/17 105/69   02/02/17 104/63   01/03/17 111/68    Weight from Last 3 Encounters:   04/20/17 55.2 kg (121 lb 12.8 oz)   02/02/17 54.9 kg (121 lb)   01/03/17 56.7 kg (125 lb)              Today, you had the following     No orders found for display       Primary Care Provider Office Phone # Fax #    Kati Alaniz [phone]       [facility] [address] [facility] [address]        Thank you!     Thank you for choosing [facility]  for your care. Our goal is always to provide you with excellent care. Hearing back from our patients is one way we can continue to improve our services. Please take a few minutes to complete the written survey that you may receive in the mail after your visit with us. Thank you!             Your Updated Medication List - Protect others around you: Learn how to safely use, store and throw away your medicines at www.disposemymeds.org.          This list is accurate as of: 4/14/17 11:59 PM.  Always use your most recent med list.                   Brand Name Dispense Instructions for use    FLUoxetine 20 MG capsule    PROzac    90 capsule    Take 2 capsules (40 mg) by mouth daily

## 2017-04-20 ENCOUNTER — OFFICE VISIT (OUTPATIENT)
Dept: OBGYN | Facility: CLINIC | Age: 37
End: 2017-04-20
Payer: COMMERCIAL

## 2017-04-20 VITALS
HEIGHT: 61 IN | SYSTOLIC BLOOD PRESSURE: 105 MMHG | BODY MASS INDEX: 23 KG/M2 | DIASTOLIC BLOOD PRESSURE: 69 MMHG | HEART RATE: 68 BPM | WEIGHT: 121.8 LBS | TEMPERATURE: 98.1 F

## 2017-04-20 DIAGNOSIS — R30.0 DYSURIA: Primary | ICD-10-CM

## 2017-04-20 LAB
ALBUMIN UR-MCNC: NEGATIVE MG/DL
APPEARANCE UR: CLEAR
BILIRUB UR QL STRIP: NEGATIVE
COLOR UR AUTO: YELLOW
GLUCOSE UR STRIP-MCNC: NEGATIVE MG/DL
HGB UR QL STRIP: NEGATIVE
KETONES UR STRIP-MCNC: NEGATIVE MG/DL
LEUKOCYTE ESTERASE UR QL STRIP: NEGATIVE
MICRO REPORT STATUS: NORMAL
NITRATE UR QL: NEGATIVE
NON-SQ EPI CELLS #/AREA URNS LPF: NORMAL /LPF
PH UR STRIP: 7 PH (ref 5–7)
RBC #/AREA URNS AUTO: NORMAL /HPF (ref 0–2)
SP GR UR STRIP: 1.01 (ref 1–1.03)
SPECIMEN SOURCE: NORMAL
URN SPEC COLLECT METH UR: NORMAL
UROBILINOGEN UR STRIP-ACNC: 0.2 EU/DL (ref 0.2–1)
WBC #/AREA URNS AUTO: NORMAL /HPF (ref 0–2)
WET PREP SPEC: NORMAL

## 2017-04-20 PROCEDURE — 87591 N.GONORRHOEAE DNA AMP PROB: CPT | Performed by: NURSE PRACTITIONER

## 2017-04-20 PROCEDURE — 87491 CHLMYD TRACH DNA AMP PROBE: CPT | Performed by: NURSE PRACTITIONER

## 2017-04-20 PROCEDURE — 99203 OFFICE O/P NEW LOW 30 MIN: CPT | Performed by: NURSE PRACTITIONER

## 2017-04-20 PROCEDURE — 81001 URINALYSIS AUTO W/SCOPE: CPT | Performed by: NURSE PRACTITIONER

## 2017-04-20 PROCEDURE — 87086 URINE CULTURE/COLONY COUNT: CPT | Performed by: NURSE PRACTITIONER

## 2017-04-20 PROCEDURE — 87210 SMEAR WET MOUNT SALINE/INK: CPT | Performed by: NURSE PRACTITIONER

## 2017-04-20 ASSESSMENT — PAIN SCALES - GENERAL: PAINLEVEL: MODERATE PAIN (5)

## 2017-04-20 NOTE — NURSING NOTE
"Chief Complaint   Patient presents with     UTI     Cloudy/blood  in urine x 4 days       Initial /69  Pulse 68  Temp 98.1  F (36.7  C) (Oral)  Ht 5' 1\" (1.549 m)  Wt 121 lb 12.8 oz (55.2 kg)  LMP 03/31/2017 (Approximate)  BMI 23.01 kg/m2 Estimated body mass index is 23.01 kg/(m^2) as calculated from the following:    Height as of this encounter: 5' 1\" (1.549 m).    Weight as of this encounter: 121 lb 12.8 oz (55.2 kg)..  BP completed using cuff size: malvin Ramos CMA    "

## 2017-04-20 NOTE — PROGRESS NOTES
S: Brianna is a 36 year old  2 para 2 presenting today with concerns of possible blood in her urine and possibly spotting. Noticed symptoms began Monday and had 1 episode of spotting and has been having intermittent cloudy urine with a little bit of darker blood in it since then. Not every time she urinates. Also having dysuria-but not every time. Denies frequency, urgency, low back pain, voiding in small amounts. LMP was 3/31/17 and using nothing for contraception. Denies abnormal vaginal discharge, itching, odor. No STI concerns. No recent changes in products, bubble baths. Cycles regular, monthly. Last few days. No heavy flow, no intramenstrual bleeding. Patient medical, surgical, social, and family history reviewed and updated at today's visit. ROS: 10 point ROS neg other than the symptoms noted above in the HPI.    O: This is a well appearing female in no acute distress. Answers questions and maintains eye contact appropriately. Vital signs noted.  RESPIRATORY: Clear to auscultation bilaterally.  CV: Regular rate and rhythm without murmur, gallop, rub  ABDOMEN: Soft, nontender, nondistended, normoactive bowel sounds. No hepatosplenomegaly. No guarding, rebounding, or rigidity.  No CVA tenderness  Vulva: No external lesions, normal hair distribution, no adenopathy  BUS:  Normal, no masses noted  Vagina: Moist, pink, no abnormal discharge, well rugated, no lesions. Small amount of brown blood in vagina  Cervix: Pink, nulliparous, midline. Without cervical motion tenderness. No active bleeding visible  Uterus: Normal size and shape, non-tender, mobile  Ovaries: No masses, non-tender, mobile  UA negative  Wet prep negative    A/P:  (R30.0) Dysuria  (primary encounter diagnosis)  Comment: Await remaining results and treat if indicated. Discussed symptoms do not appear to be urinary, but some light spotting. As cycles normally regular and no history of breakthrough bleeding, patient wants to monitor for now. If  this occurs over the next few cycles, would be willing to return to clinic for further evaluation. Warning signs to monitor for and report immediately discussed with patient and she verbalizes understanding.  Plan: Urine Culture Aerobic Bacterial, UA with         Microscopic, Wet prep, Chlamydia trachomatis         PCR, Neisseria gonorrhoeae PCR        Anisha MEJIA CNP

## 2017-04-20 NOTE — MR AVS SNAPSHOT
After Visit Summary   4/20/2017    Brianna Abdalla    MRN: [id number]           Patient Information     Date Of Birth          1980        Visit Information        Provider Department      4/20/2017 10:10 AM Anisha Ramirez APRN CNP [facility]        Today's Diagnoses     Dysuria    -  1       Follow-ups after your visit        Your next 10 appointments already scheduled     Apr 20, 2017  2:30 PM CDT   [facility] Visit with Annie Jones   [facility]    [address] [facility] [address] [phone]              Who to contact     If you have questions or need follow up information about today's clinic visit or your schedule please contact [facility] directly at [phone].  Normal or non-critical lab and imaging results will be communicated to you by MyChart, letter or phone within 4 business days after the clinic has received the results. If you do not hear from us within 7 days, please contact the clinic through [employer] or phone. If you have a critical or abnormal lab result, we will notify you by phone as soon as possible.  Submit refill requests through [employer] or call your pharmacy and they will forward the refill request to us. Please allow 3 business days for your refill to be completed.          Additional Information About Your Visit        MyChart Information     [employer] gives you secure access to your electronic health record. If you see a primary care provider, you can also send messages to your care team and make appointments. If you have questions, please call your primary care clinic.  If you do not have a primary care provider, please call [phone] and they will assist you.        Care EveryWhere ID     This is your Care EveryWhere ID. This could be used by other organizations to access your [place] medical records  [id number]        Your Vitals Were     Pulse Temperature Height  "Last Period BMI (Body Mass Index)       68 98.1  F (36.7  C) (Oral) 5' 1\" (1.549 m) 03/31/2017 (Approximate) 23.01 kg/m2        Blood Pressure from Last 3 Encounters:   04/20/17 105/69   02/02/17 104/63   01/03/17 111/68    Weight from Last 3 Encounters:   04/20/17 121 lb 12.8 oz (55.2 kg)   02/02/17 121 lb (54.9 kg)   01/03/17 125 lb (56.7 kg)              We Performed the Following     Chlamydia trachomatis PCR     Neisseria gonorrhoeae PCR     UA with Microscopic     Urine Culture Aerobic Bacterial     Wet prep          Today's Medication Changes          These changes are accurate as of: 4/20/17 11:38 AM.  If you have any questions, ask your nurse or doctor.               These medicines have changed or have updated prescriptions.        Dose/Directions    FLUoxetine 20 MG capsule   Commonly known as:  PROzac   This may have changed:  Another medication with the same name was removed. Continue taking this medication, and follow the directions you see here.   Used for:  TONO (generalized anxiety disorder), Major depressive disorder, recurrent episode, moderate (H)   Changed by:  Kati Alaniz MD        Dose:  40 mg   Take 2 capsules (40 mg) by mouth daily   Quantity:  90 capsule   Refills:  1                Primary Care Provider Office Phone # Fax #    Kati Alaniz [phone]       [facility] [id number] [place] [address]        Thank you!     Thank you for choosing [facility]  for your care. Our goal is always to provide you with excellent care. Hearing back from our patients is one way we can continue to improve our services. Please take a few minutes to complete the written survey that you may receive in the mail after your visit with us. Thank you!             Your Updated Medication List - Protect others around you: Learn how to safely use, store and throw away your medicines at www.disposemymeds.org.          This list is accurate as of: 4/20/17 11:38 " AM.  Always use your most recent med list.                   Brand Name Dispense Instructions for use    FLUoxetine 20 MG capsule    PROzac    90 capsule    Take 2 capsules (40 mg) by mouth daily

## 2017-04-21 LAB
BACTERIA SPEC CULT: NORMAL
C TRACH DNA SPEC QL NAA+PROBE: NORMAL
MICRO REPORT STATUS: NORMAL
N GONORRHOEA DNA SPEC QL NAA+PROBE: NORMAL
SPECIMEN SOURCE: NORMAL

## 2017-05-02 ENCOUNTER — OFFICE VISIT (OUTPATIENT)
Dept: BEHAVIORAL HEALTH | Facility: CLINIC | Age: 37
End: 2017-05-02
Payer: COMMERCIAL

## 2017-05-02 DIAGNOSIS — F33.1 MAJOR DEPRESSIVE DISORDER, RECURRENT EPISODE, MODERATE (H): ICD-10-CM

## 2017-05-02 DIAGNOSIS — F41.1 GAD (GENERALIZED ANXIETY DISORDER): Primary | ICD-10-CM

## 2017-05-02 PROCEDURE — 90834 PSYTX W PT 45 MINUTES: CPT | Performed by: MARRIAGE & FAMILY THERAPIST

## 2017-05-02 NOTE — MR AVS SNAPSHOT
After Visit Summary   5/2/2017    Brianna Abdalla    MRN: [id number]           Patient Information     Date Of Birth          1980        Visit Information        Provider Department      5/2/2017 4:00 PM Annie Jones [facility]        Today's Diagnoses     TONO (generalized anxiety disorder)    -  1    Major depressive disorder, recurrent episode, moderate (H)           Follow-ups after your visit        Your next 10 appointments already scheduled     May 18, 2017  3:30 PM CDT   Return Visit with Annie Jones   [facility]    [address] [address] [phone]              Who to contact     If you have questions or need follow up information about today's clinic visit or your schedule please contact [facility] directly at [phone].  Normal or non-critical lab and imaging results will be communicated to you by MyChart, letter or phone within 4 business days after the clinic has received the results. If you do not hear from us within 7 days, please contact the clinic through [employer] or phone. If you have a critical or abnormal lab result, we will notify you by phone as soon as possible.  Submit refill requests through [employer] or call your pharmacy and they will forward the refill request to us. Please allow 3 business days for your refill to be completed.          Additional Information About Your Visit        MyChart Information     [employer] gives you secure access to your electronic health record. If you see a primary care provider, you can also send messages to your care team and make appointments. If you have questions, please call your primary care clinic.  If you do not have a primary care provider, please call [phone] and they will assist you.        Care EveryWhere ID     This is your Care EveryWhere ID. This could be used by other organizations to access your [place]  medical records  [id number]        Your Vitals Were     Last Period                   03/31/2017 (Approximate)            Blood Pressure from Last 3 Encounters:   04/20/17 105/69   02/02/17 104/63   01/03/17 111/68    Weight from Last 3 Encounters:   04/20/17 55.2 kg (121 lb 12.8 oz)   02/02/17 54.9 kg (121 lb)   01/03/17 56.7 kg (125 lb)              Today, you had the following     No orders found for display       Primary Care Provider Office Phone # Fax #    Kati Alaniz [phone]       [facility] [address] [facility] [address]        Thank you!     Thank you for choosing [facility]  for your care. Our goal is always to provide you with excellent care. Hearing back from our patients is one way we can continue to improve our services. Please take a few minutes to complete the written survey that you may receive in the mail after your visit with us. Thank you!             Your Updated Medication List - Protect others around you: Learn how to safely use, store and throw away your medicines at www.disposemymeds.org.          This list is accurate as of: 5/2/17 11:59 PM.  Always use your most recent med list.                   Brand Name Dispense Instructions for use    FLUoxetine 20 MG capsule    PROzac    90 capsule    Take 2 capsules (40 mg) by mouth daily

## 2017-05-02 NOTE — PROGRESS NOTES
[facility]   April 14 , 2017      Behavioral Health Clinician Progress Note    Patient Name: Brianna Abdalla           Service Type: Individual      Service Location:   Face to Face in Clinic     Session Start Time: 1:56  Session End Time: 2:45      Session Length: 38 - 52      Attendees: Patient    Visit Activities (Refresh list every visit): [facility] Only    Diagnostic Assessment Date: 1/17/17  Treatment Plan Review Date: 2/9/17  See Flowsheets for today's PHQ-9 and TONO-7 results  Previous PHQ-9:   PHQ-9 SCORE 1/3/2017 1/17/2017 2/2/2017   Total Score 15 9 8     Previous TONO-7:   TONO-7 SCORE 1/3/2017 1/17/2017 2/2/2017   Total Score 19 13 13       CORA LEVEL:  CORA Score (Last Two) 5/11/2011 1/17/2017   CORA Raw Score 39 37   Activation Score 56.4 79.2   CORA Level 3 4       DATA  Extended Session (60+ minutes): No  Interactive Complexity: No  Crisis: No    Treatment Objective(s) Addressed in This Session:  Target Behavior(s): disease management/lifestyle changes anxiety and depression    Depressed Mood: Increase interest, engagement, and pleasure in doing things  Decrease frequency and intensity of feeling down, depressed, hopeless  Improve quantity and quality of night time sleep / decrease daytime naps  Feel less tired and more energy during the day   Improve diet, appetite, mindful eating, and / or meal planning  Identify negative self-talk and behaviors: challenge core beliefs, myths, and actions  Improve concentration, focus, and mindfulness in daily activities   Feel less fidgety, restless or slow in daily activities / interpersonal interactions  Anxiety: will experience a reduction in anxiety, will develop more effective coping skills to manage anxiety symptoms, will develop healthy cognitive patterns and beliefs and will increase ability to function adaptively    Current Stressors / Issues:  Patient reports the trip to [place] with younger daughter went well and they had a good time  together. Patient reports her older daughter also returned from her road trip to [place] safely. Daughter's close friend has decided to attend same college as her daughter in [place] and they plan to be roommates. Patient reports she feels some relief that her daughter will have her close friend with her when she moves away to college. Patient reports she has been very sad about pet rabbit (Yoli) that passed away this week, she had the pet rabbit for 9 years and was attached to her. 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       Progress on Treatment Objective(s) / Homework:  New Objective established this session - ACTION (Actively working towards change); Intervened by reinforcing change plan / affirming steps taken    Also provided psychoeducation about behavioral health condition, symptoms, and treatment options     Cognitive Behavioral Therapy   -Processed grief/loss themes in adjustments and transitions as her daughters get older   -Mindfulness and distraction exercises   -Cognitive re-frames and recognizing cognitive distortions     Care Plan review completed: Yes    Medication Review:  No changes to current psychiatric medication(s)    Medication Compliance:  Yes    Changes in Health Issues:   None reported    Chemical Use Review:   Substance Use: Chemical use reviewed, no active concerns identified      Tobacco Use: No current tobacco use.      Assessment: Current Emotional / Mental Status (status of significant symptoms):  Risk status (Self / Other harm or suicidal ideation)  Patient denies a history of suicidal ideation, suicide attempts, self-injurious behavior, homicidal ideation, homicidal behavior and and other safety concerns  Patient denies current fears or concerns for personal safety.  Patient denies current or recent  suicidal ideation or behaviors.  Patient denies current or recent homicidal ideation or behaviors.  Patient denies current or recent self injurious behavior or ideation.  Patient denies other safety concerns.  A safety and risk management plan has not been developed at this time, however patient was encouraged to call [place] [phone] should there be a change in any of these risk factors.    Appearance:   Appropriate   Eye Contact:   Good   Psychomotor Behavior: Normal   Attitude:   Cooperative   Orientation:   All  Speech   Rate / Production: Normal    Volume:  Normal   Mood:    Anxious   Affect:    Worrisome   Thought Content:  Rumination   Thought Form:  Coherent  Logical   Insight:    Good     Diagnoses:  1. Major depressive disorder, recurrent episode, moderate (H)    2. TONO (generalized anxiety disorder)        Collateral Reports Completed:  Not Applicable    Plan: (Homework, other):  Patient was given information about behavioral services and encouraged to schedule a follow up appointment with the clinic [facility] in 1 week.  She was also given Cognitive Behavioral Therapy skills to practice when experiencing anxiety and deep Breathing Strategies to practice when experiencing anxiety.  CD Recommendations: No indications of CD issues.  JAN Nieves, [facility]                                                   Treatment Plan    Client's Name: Brianna Abdalla  YOB: 1980    Date: 2/9/17    DSM-V Diagnoses: 296.32 Major Depressive Disorder, Recurrent Episode, Moderate _ or 300.02 (F41.1) Generalized Anxiety Disorder  Psychosocial / Contextual Factors: None  WHODAS: 16    Referral / Collaboration:  Referral to another professional/service is not indicated at this time..    Anticipated number of session or this episode of care: 6-8      MeasurableTreatment Goal(s) related to diagnosis / functional impairment(s)  Goal 1: Client will decrease symptoms and incorporate healthy and effective coping  strategies     I will know I've met my goal when feel less anxious and down.      Objective #A (Client Action)    Client will use thought-stopping strategy daily to reduce intrusive thoughts.  Status: Continued - Date(s):     Intervention(s)  Therapist will teach distraction skills. CBT- Mindfulness/Breathing exercises, Cogntive Re-frames, Negative Automtic Thoughts, Balnaced Thinking Skills.    Objective #B  Client will identify at least 6 triggers for anxiety.  Status: Continued - Date(s):     Intervention(s)  Therapist will role-play effective communication skills  teach emotional recognition/identification. Emotional Regulation Skills and Healthy Expression of emotions.    Objective #C  Client will Increase interest, engagement, and pleasure in doing things  Decrease frequency and intensity of feeling down, depressed, hopeless  Identify negative self-talk and behaviors: challenge core beliefs, myths, and actions  Improve concentration, focus, and mindfulness in daily activities   Feel less fidgety, restless or slow in daily activities / interpersonal interactions.  Status: Continued - Date(s):     Intervention(s)  Therapist will role-play effective communication skills  teach about healthy boundaries. Assertiveness skills, and conflict resolution skills.        Client has reviewed and agreed to the above plan.      Annie Jones  February 8, 2017

## 2017-05-05 NOTE — PROGRESS NOTES
[facility]   May 2, 2017      Behavioral Health Clinician Progress Note    Patient Name: Brianna Abdalla           Service Type: Individual      Service Location:   Face to Face in Clinic     Session Start Time: 4:00  Session End Time: 4:45      Session Length: 38 - 52      Attendees: Patient    Visit Activities (Refresh list every visit): [facility] Only    Diagnostic Assessment Date: 1/17/17  Treatment Plan Review Date: 2/9/17  See Flowsheets for today's PHQ-9 and TONO-7 results  Previous PHQ-9:   PHQ-9 SCORE 1/3/2017 1/17/2017 2/2/2017   Total Score 15 9 8     Previous TONO-7:   TONO-7 SCORE 1/3/2017 1/17/2017 2/2/2017   Total Score 19 13 13       CORA LEVEL:  CORA Score (Last Two) 5/11/2011 1/17/2017   CORA Raw Score 39 37   Activation Score 56.4 79.2   CORA Level 3 4       DATA  Extended Session (60+ minutes): No  Interactive Complexity: No  Crisis: No    Treatment Objective(s) Addressed in This Session:  Target Behavior(s): disease management/lifestyle changes anxiety and depression    Depressed Mood: Increase interest, engagement, and pleasure in doing things  Decrease frequency and intensity of feeling down, depressed, hopeless  Improve quantity and quality of night time sleep / decrease daytime naps  Feel less tired and more energy during the day   Improve diet, appetite, mindful eating, and / or meal planning  Identify negative self-talk and behaviors: challenge core beliefs, myths, and actions  Improve concentration, focus, and mindfulness in daily activities   Feel less fidgety, restless or slow in daily activities / interpersonal interactions  Anxiety: will experience a reduction in anxiety, will develop more effective coping skills to manage anxiety symptoms, will develop healthy cognitive patterns and beliefs and will increase ability to function adaptively    Current Stressors / Issues:  Patient reports stressors with trying to coordinate both daughters schedules and not getting the  help and participation she needs from boyfriend. 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      Care Plan review completed: Yes    Medication Review:  No changes to current psychiatric medication(s)    Medication Compliance:  Yes    Changes in Health Issues:   None reported    Chemical Use Review:   Substance Use: Chemical use reviewed, no active concerns identified      Tobacco Use: No current tobacco use.      Assessment: Current Emotional / Mental Status (status of significant symptoms):  Risk status (Self / Other harm or suicidal ideation)  Patient denies a history of suicidal ideation, suicide attempts, self-injurious behavior, homicidal ideation, homicidal behavior and and other safety concerns  Patient denies current fears or concerns for personal safety.  Patient denies current or recent suicidal ideation or behaviors.  Patient denies current or recent homicidal ideation or behaviors.  Patient denies current or recent self injurious behavior or ideation.  Patient denies other safety concerns.  A safety and risk management plan has not been developed at this time, however patient was encouraged to call [facility] should there be a change in any of these risk  factors.    Appearance:   Appropriate   Eye Contact:   Good   Psychomotor Behavior: Normal   Attitude:   Cooperative   Orientation:   All  Speech   Rate / Production: Normal    Volume:  Normal   Mood:    Anxious   Affect:    Worrisome   Thought Content:  Rumination   Thought Form:  Coherent  Logical   Insight:    Good     Diagnoses:  1. TONO (generalized anxiety disorder)    2. Major depressive disorder, recurrent episode, moderate (H)        Collateral Reports Completed:  Not Applicable    Plan: (Homework, other):  Patient was given information about behavioral services and encouraged to schedule a follow up appointment with the clinic [facility] in 1 week.  She was also given Cognitive Behavioral Therapy skills to practice when experiencing anxiety and deep Breathing Strategies to practice when experiencing anxiety.  CD Recommendations: No indications of CD issues.  Annie Jones, JAN, [facility]                                                   Treatment Plan    Client's Name: Brianna Abdalla  YOB: 1980    Date: 2/9/17    DSM-V Diagnoses: 296.32 Major Depressive Disorder, Recurrent Episode, Moderate _ or 300.02 (F41.1) Generalized Anxiety Disorder  Psychosocial / Contextual Factors: None  WHODAS: 16    Referral / Collaboration:  Referral to another professional/service is not indicated at this time..    Anticipated number of session or this episode of care: 6-8      MeasurableTreatment Goal(s) related to diagnosis / functional impairment(s)  Goal 1: Client will decrease symptoms and incorporate healthy and effective coping strategies     I will know I've met my goal when feel less anxious and down.      Objective #A (Client Action)    Client will use thought-stopping strategy daily to reduce intrusive thoughts.  Status: Continued - Date(s):     Intervention(s)  Therapist will teach distraction skills. CBT- Mindfulness/Breathing exercises, Cogntive Re-frames, Negative Automtic Thoughts, Balnaced Thinking  Skills.    Objective #B  Client will identify at least 6 triggers for anxiety.  Status: Continued - Date(s):     Intervention(s)  Therapist will role-play effective communication skills  teach emotional recognition/identification. Emotional Regulation Skills and Healthy Expression of emotions.    Objective #C  Client will Increase interest, engagement, and pleasure in doing things  Decrease frequency and intensity of feeling down, depressed, hopeless  Identify negative self-talk and behaviors: challenge core beliefs, myths, and actions  Improve concentration, focus, and mindfulness in daily activities   Feel less fidgety, restless or slow in daily activities / interpersonal interactions.  Status: Continued - Date(s):     Intervention(s)  Therapist will role-play effective communication skills  teach about healthy boundaries. Assertiveness skills, and conflict resolution skills.        Client has reviewed and agreed to the above plan.      Annie Jones  February 8, 2017

## 2017-05-18 ENCOUNTER — OFFICE VISIT (OUTPATIENT)
Dept: BEHAVIORAL HEALTH | Facility: CLINIC | Age: 37
End: 2017-05-18
Payer: COMMERCIAL

## 2017-05-18 DIAGNOSIS — F33.1 MAJOR DEPRESSIVE DISORDER, RECURRENT EPISODE, MODERATE (H): ICD-10-CM

## 2017-05-18 DIAGNOSIS — F41.1 GAD (GENERALIZED ANXIETY DISORDER): Primary | ICD-10-CM

## 2017-05-18 PROCEDURE — 90832 PSYTX W PT 30 MINUTES: CPT | Performed by: MARRIAGE & FAMILY THERAPIST

## 2017-05-24 NOTE — PROGRESS NOTES
[facility]   May 18, 2017      Behavioral Health Clinician Progress Note    Patient Name: Brianna Abdalla           Service Type: Individual      Service Location:   Face to Face in Clinic     Session Start Time: 3:30  Session End Time: 4:00      Session Length: 16 - 37      Attendees: Patient    Visit Activities (Refresh list every visit): [facility] Only    Diagnostic Assessment Date: 1/17/17  Treatment Plan Review Date: 2/9/17  See Flowsheets for today's PHQ-9 and TONO-7 results  Previous PHQ-9:   PHQ-9 SCORE 1/3/2017 1/17/2017 2/2/2017   Total Score 15 9 8     Previous TONO-7:   TONO-7 SCORE 1/3/2017 1/17/2017 2/2/2017   Total Score 19 13 13       CORA LEVEL:  CORA Score (Last Two) 5/11/2011 1/17/2017   CORA Raw Score 39 37   Activation Score 56.4 79.2   CORA Level 3 4       DATA  Extended Session (60+ minutes): No  Interactive Complexity: No  Crisis: No    Treatment Objective(s) Addressed in This Session:  Target Behavior(s): disease management/lifestyle changes anxiety and depression    Depressed Mood: Increase interest, engagement, and pleasure in doing things  Decrease frequency and intensity of feeling down, depressed, hopeless  Improve quantity and quality of night time sleep / decrease daytime naps  Feel less tired and more energy during the day   Improve diet, appetite, mindful eating, and / or meal planning  Identify negative self-talk and behaviors: challenge core beliefs, myths, and actions  Improve concentration, focus, and mindfulness in daily activities   Feel less fidgety, restless or slow in daily activities / interpersonal interactions  Anxiety: will experience a reduction in anxiety, will develop more effective coping skills to manage anxiety symptoms, will develop healthy cognitive patterns and beliefs and will increase ability to function adaptively    Current Stressors / Issues:  Patient reports good trip with her two daughters to go on college tour with oldest daughter in [place]  preparing for her move in the fall. Patient reports she has been feeling increased anxiety since they returned from their trip, due to her interactions with boyfriend Greg.     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      Care Plan review completed: Yes    Medication Review:  No changes to current psychiatric medication(s)    Medication Compliance:  Yes    Changes in Health Issues:   None reported    Chemical Use Review:   Substance Use: Chemical use reviewed, no active concerns identified      Tobacco Use: No current tobacco use.      Assessment: Current Emotional / Mental Status (status of significant symptoms):  Risk status (Self / Other harm or suicidal ideation)  Patient denies a history of suicidal ideation, suicide attempts, self-injurious behavior, homicidal ideation, homicidal behavior and and other safety concerns  Patient denies current fears or concerns for personal safety.  Patient denies current or recent suicidal ideation or behaviors.  Patient denies current or recent homicidal ideation or behaviors.  Patient denies current or recent self injurious behavior or ideation.  Patient denies other safety concerns.  A safety and risk management plan has not been developed at this time, however patient was encouraged to call [facility] should there be a change in any of these risk factors.    Appearance:   Appropriate   Eye Contact:   Good   Psychomotor Behavior: Normal    Attitude:   Cooperative   Orientation:   All  Speech   Rate / Production: Normal    Volume:  Normal   Mood:    Anxious   Affect:    Worrisome   Thought Content:  Rumination   Thought Form:  Coherent  Logical   Insight:    Good     Diagnoses:  1. TONO (generalized anxiety disorder)    2. Major depressive disorder, recurrent episode, moderate (H)        Collateral Reports Completed:  Not Applicable    Plan: (Homework, other):  Patient was given information about behavioral services and encouraged to schedule a follow up appointment with the clinic [facility] in 1 week.  She was also given Cognitive Behavioral Therapy skills to practice when experiencing anxiety and deep Breathing Strategies to practice when experiencing anxiety.  CD Recommendations: No indications of CD issues.  Annie Jones, JAN, [facility]                                                   Treatment Plan    Client's Name: Brianna Abdalla  YOB: 1980    Date: 2/9/17    DSM-V Diagnoses: 296.32 Major Depressive Disorder, Recurrent Episode, Moderate _ or 300.02 (F41.1) Generalized Anxiety Disorder  Psychosocial / Contextual Factors: None  WHODAS: 16    Referral / Collaboration:  Referral to another professional/service is not indicated at this time..    Anticipated number of session or this episode of care: 6-8      MeasurableTreatment Goal(s) related to diagnosis / functional impairment(s)  Goal 1: Client will decrease symptoms and incorporate healthy and effective coping strategies     I will know I've met my goal when feel less anxious and down.      Objective #A (Client Action)    Client will use thought-stopping strategy daily to reduce intrusive thoughts.  Status: Continued - Date(s):     Intervention(s)  Therapist will teach distraction skills. CBT- Mindfulness/Breathing exercises, Cogntive Re-frames, Negative Automtic Thoughts, Balnaced Thinking Skills.    Objective #B  Client will identify at least 6 triggers for anxiety.  Status:  Continued - Date(s):     Intervention(s)  Therapist will role-play effective communication skills  teach emotional recognition/identification. Emotional Regulation Skills and Healthy Expression of emotions.    Objective #C  Client will Increase interest, engagement, and pleasure in doing things  Decrease frequency and intensity of feeling down, depressed, hopeless  Identify negative self-talk and behaviors: challenge core beliefs, myths, and actions  Improve concentration, focus, and mindfulness in daily activities   Feel less fidgety, restless or slow in daily activities / interpersonal interactions.  Status: Continued - Date(s):     Intervention(s)  Therapist will role-play effective communication skills  teach about healthy boundaries. Assertiveness skills, and conflict resolution skills.        Client has reviewed and agreed to the above plan.      Annie Jones  February 8, 2017

## 2017-06-29 ENCOUNTER — TELEPHONE (OUTPATIENT)
Dept: FAMILY MEDICINE | Facility: CLINIC | Age: 37
End: 2017-06-29

## 2017-06-29 DIAGNOSIS — F33.1 MAJOR DEPRESSIVE DISORDER, RECURRENT EPISODE, MODERATE (H): ICD-10-CM

## 2017-06-29 DIAGNOSIS — F41.1 GAD (GENERALIZED ANXIETY DISORDER): ICD-10-CM

## 2017-06-29 ASSESSMENT — ANXIETY QUESTIONNAIRES
5. BEING SO RESTLESS THAT IT IS HARD TO SIT STILL: NOT AT ALL
1. FEELING NERVOUS, ANXIOUS, OR ON EDGE: SEVERAL DAYS
2. NOT BEING ABLE TO STOP OR CONTROL WORRYING: MORE THAN HALF THE DAYS
3. WORRYING TOO MUCH ABOUT DIFFERENT THINGS: MORE THAN HALF THE DAYS
7. FEELING AFRAID AS IF SOMETHING AWFUL MIGHT HAPPEN: NOT AT ALL
6. BECOMING EASILY ANNOYED OR IRRITABLE: MORE THAN HALF THE DAYS
GAD7 TOTAL SCORE: 7

## 2017-06-29 ASSESSMENT — PATIENT HEALTH QUESTIONNAIRE - PHQ9: 5. POOR APPETITE OR OVEREATING: NOT AT ALL

## 2017-06-29 NOTE — TELEPHONE ENCOUNTER
Discussed verbal orders with Dr. Kati Alaniz, to provider to cosign.  Since patient is following with [facility], will refill x6 months and have patient follow up with PCP 6 months.  Informed patient of this. Patient verbalized understanding  Becki Alaniz

## 2017-06-29 NOTE — TELEPHONE ENCOUNTER
Patient stated she needs a refill of Prozac 40 mg    TONO-7 SCORE 1/17/2017 2/2/2017 6/29/2017   Total Score 13 13 7     PHQ-9 SCORE 1/17/2017 2/2/2017 6/29/2017   Total Score 9 8 6     Its been 6 months since patient has been seen, To provider to advise on OV or refill  MARSHA Recinos RN

## 2017-06-29 NOTE — TELEPHONE ENCOUNTER
Patient is calling in regards to a medication that needs to be refilled, and would like to discuss. Thank you.

## 2017-06-29 NOTE — TELEPHONE ENCOUNTER
Per 2/22/17 ov, 1. Adjustment disorder with anxious mood  As above, increase porzac to 20, fu via mychart in one month  - FLUoxetine (PROZAC) 20 MG capsule; Take 1 capsule (20 mg) by mouth daily  Dispense: 30 capsule; Refill: 3    Patient has been seeing [facility].    Call went right to patient VM.  Left message for patient to call back on their VM. Writers direct line given, Sagrario NUNEZ [phone]. Becki Johnson RN

## 2017-06-30 ASSESSMENT — ANXIETY QUESTIONNAIRES: GAD7 TOTAL SCORE: 7

## 2017-06-30 ASSESSMENT — PATIENT HEALTH QUESTIONNAIRE - PHQ9: SUM OF ALL RESPONSES TO PHQ QUESTIONS 1-9: 6

## 2017-11-03 ENCOUNTER — OFFICE VISIT (OUTPATIENT)
Dept: FAMILY MEDICINE | Facility: CLINIC | Age: 37
End: 2017-11-03
Payer: COMMERCIAL

## 2017-11-03 VITALS
BODY MASS INDEX: 24.56 KG/M2 | OXYGEN SATURATION: 99 % | SYSTOLIC BLOOD PRESSURE: 109 MMHG | RESPIRATION RATE: 15 BRPM | DIASTOLIC BLOOD PRESSURE: 67 MMHG | HEART RATE: 63 BPM | WEIGHT: 130 LBS | TEMPERATURE: 97.4 F
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
## 2017-11-03 DIAGNOSIS — H60.392 INFECTIVE OTITIS EXTERNA, LEFT: Primary | ICD-10-CM

## 2017-11-03 PROCEDURE — 99214 OFFICE O/P EST MOD 30 MIN: CPT | Performed by: PHYSICIAN ASSISTANT

## 2017-11-03 RX ORDER — NEOMYCIN SULFATE, POLYMYXIN B SULFATE AND HYDROCORTISONE 10; 3.5; 1 MG/ML; MG/ML; [USP'U]/ML
4 SUSPENSION/ DROPS AURICULAR (OTIC) 4 TIMES DAILY
Qty: 10 ML | Refills: 0 | Status: SHIPPED | OUTPATIENT
Start: 2017-11-03 | End: 2019-01-24

## 2017-11-03 RX ORDER — DOXYCYCLINE HYCLATE 100 MG
100 TABLET ORAL 2 TIMES DAILY
Qty: 14 TABLET | Refills: 0 | Status: SHIPPED | OUTPATIENT
Start: 2017-11-03 | End: 2017-11-10

## 2017-11-03 ASSESSMENT — ENCOUNTER SYMPTOMS
HEMATOLOGIC/LYMPHATIC NEGATIVE: 1
ROS SKIN COMMENTS: AS IN HPI
NEUROLOGICAL NEGATIVE: 1
EYES NEGATIVE: 1
CONSTITUTIONAL NEGATIVE: 1
PSYCHIATRIC NEGATIVE: 1
CARDIOVASCULAR NEGATIVE: 1
RESPIRATORY NEGATIVE: 1

## 2017-11-03 NOTE — MR AVS SNAPSHOT
After Visit Summary   11/3/2017    Brianna Abdalla    MRN: [id number]           Patient Information     Date Of Birth          1980        Visit Information        Provider Department      11/3/2017 4:20 PM Abby Lopez PA-C [facility]         Follow-ups after your visit        Your next 10 appointments already scheduled     Nov 03, 2017  4:20 PM CDT   SHORT with Abby Lopez PA-C   [facility]    [address] [address] [phone]              Who to contact     If you have questions or need follow up information about today's clinic visit or your schedule please contact [facility] directly at [phone].  Normal or non-critical lab and imaging results will be communicated to you by MyChart, letter or phone within 4 business days after the clinic has received the results. If you do not hear from us within 7 days, please contact the clinic through MyChart or phone. If you have a critical or abnormal lab result, we will notify you by phone as soon as possible.  Submit refill requests through [employer] or call your pharmacy and they will forward the refill request to us. Please allow 3 business days for your refill to be completed.          Additional Information About Your Visit        MyChart Information     [employer] gives you secure access to your electronic health record. If you see a primary care provider, you can also send messages to your care team and make appointments. If you have questions, please call your primary care clinic.  If you do not have a primary care provider, please call [phone] and they will assist you.        Care EveryWhere ID     This is your Care EveryWhere ID. This could be used by other organizations to access your [place] medical records  [id number]         Blood Pressure from Last 3 Encounters:   04/20/17 105/69   02/02/17 104/63   01/03/17  111/68    Weight from Last 3 Encounters:   04/20/17 121 lb 12.8 oz (55.2 kg)   02/02/17 121 lb (54.9 kg)   01/03/17 125 lb (56.7 kg)              Today, you had the following     No orders found for display       Primary Care Provider Office Phone # Fax #    Kati Alaniz [phone] [address] [facility] [place] [address]        Equal Access to Services     DEVORAH MARSH : Hadii aad ku hadasho Soomaali, waaxda luqadaha, qaybta kaalmada adeegyada, waxay idiin hayaan adeeg kharash la'beth . So [facility] [phone].    ATENCIÓN: Si habla español, tiene a turner disposición servicios gratuitos de asistencia lingüística. [facility] [phone].    We comply with applicable federal civil rights laws and [place] laws. We do not discriminate on the basis of race, color, national origin, age, disability, sex, sexual orientation, or gender identity.            Thank you!     Thank you for choosing [facility]  for your care. Our goal is always to provide you with excellent care. Hearing back from our patients is one way we can continue to improve our services. Please take a few minutes to complete the written survey that you may receive in the mail after your visit with us. Thank you!             Your Updated Medication List - Protect others around you: Learn how to safely use, store and throw away your medicines at www.disposemymeds.org.          This list is accurate as of: 11/3/17  4:12 PM.  Always use your most recent med list.                   Brand Name Dispense Instructions for use Diagnosis    FLUoxetine 20 MG capsule    PROzac    180 capsule    Take 2 capsules (40 mg) by mouth daily    TONO (generalized anxiety disorder), Major depressive disorder, recurrent episode, moderate (H)

## 2017-11-03 NOTE — PROGRESS NOTES
SUBJECTIVE:   Brianna Abdalla is a 37 year old female who presents to clinic today for the following health issues:      SYMPTOMS      Duration: 3 weeks    Description  ear pain left    Severity: moderate    Accompanying signs and symptoms: None    History (predisposing factors):  none    Precipitating or alleviating factors: None    Therapies tried and outcome:  None    She has ear pain on and off for three weeks  It is burning  She has treated with ibuprofen  She also has a sore throat  Not tender  She has some ringing in her ears, but no acute hearing changes  No runny nose or headache  No fever    Patient was bit by her dog 11/2/17  Her dog bite her thumb yesterday, it was bleeding on and off all night    Problem list and histories reviewed & adjusted, as indicated.  Additional history: as documented    Patient Active Problem List   Diagnosis     CARDIOVASCULAR SCREENING; LDL GOAL LESS THAN 160     Abnormal Pap smear of cervix     Thrombocytopenia (H)     BCC (basal cell carcinoma), face     Major depressive disorder, recurrent episode, moderate (H)     TONO (generalized anxiety disorder)     Past Surgical History:   Procedure Laterality Date     LEEP TX, CERVICAL      ARNOLDO 2/3       Social History   Substance Use Topics     Smoking status: Never Smoker     Smokeless tobacco: Never Used     Alcohol use No     Family History   Problem Relation Age of Onset     DIABETES Maternal Grandmother      DIABETES Brother          Current Outpatient Prescriptions   Medication Sig Dispense Refill     doxycycline (VIBRA-TABS) 100 MG tablet Take 1 tablet (100 mg) by mouth 2 times daily for 7 days 14 tablet 0     neomycin-polymyxin-hydrocortisone (CORTISPORIN) 3.5-69378-3 otic suspension Place 4 drops in ear(s) 4 times daily 10 mL 0     FLUoxetine (PROZAC) 20 MG capsule Take 2 capsules (40 mg) by mouth daily 180 capsule 1     Allergies   Allergen Reactions     Amoxicillin      Iodine I 131 Tositumomab          Reviewed and  updated as needed this visit by clinical staffTobacco       Reviewed and updated as needed this visit by Provider         Review of Systems   Constitutional: Negative.    HENT:        As in HPI   Eyes: Negative.    Respiratory: Negative.    Cardiovascular: Negative.    Genitourinary: Negative.    Musculoskeletal:        As in HPI   Skin:        As in HPI   Neurological: Negative.    Hematological: Negative.    Psychiatric/Behavioral: Negative.          OBJECTIVE:     /67  Pulse 63  Temp 97.4  F (36.3  C) (Oral)  Resp 15  Wt 130 lb (59 kg)  SpO2 99%  Breastfeeding? No  BMI 24.56 kg/m2  Body mass index is 24.56 kg/(m^2).  GENERAL: healthy, alert and no distress  EYES: Eyes grossly normal to inspection, PERRL and conjunctivae and sclerae normal  HENT: normal cephalic/atraumatic, right ear: normal: no effusions, no erythema, normal landmarks, left ear: purulent drainage in canal and red and boggy canal, nose and mouth without ulcers or lesions, oropharynx clear and oral mucous membranes moist  NECK: no adenopathy, no asymmetry, masses  RESP: lungs clear to auscultation - no rales, rhonchi or wheezes  CV: regular rate and rhythm, normal S1 S2, no murmur  MS: Right thumb: abrasion along the lateral nail which does not involve the nail bed, no drainage, no erythema, no swelling.  Tender adjacent to cut. Normal ROM and intact sensation.   SKIN: abrasion right thumb as described above  NEURO: Normal strength and tone, mentation intact and speech normal    Diagnostic Test Results:  none     ASSESSMENT/PLAN:     1. Infective otitis externa, left  - neomycin-polymyxin-hydrocortisone (CORTISPORIN) 3.5-84771-4 otic suspension; Place 4 drops in ear(s) 4 times daily  Dispense: 10 mL; Refill: 0    2. Dog bite, initial encounter  - we reviewed signs of infection and wound care today.  She will return if she develops purulent drainage, erythema, swelling or worsening pain.   - doxycycline (VIBRA-TABS) 100 MG tablet; Take  1 tablet (100 mg) by mouth 2 times daily for 7 days  Dispense: 14 tablet; Refill: 0      Abby Lopez PA-C  [facility]

## 2017-12-21 ENCOUNTER — OFFICE VISIT (OUTPATIENT)
Dept: FAMILY MEDICINE | Facility: CLINIC | Age: 37
End: 2017-12-21
Payer: COMMERCIAL

## 2017-12-21 VITALS
HEART RATE: 51 BPM | OXYGEN SATURATION: 100 % | BODY MASS INDEX: 24.75 KG/M2 | TEMPERATURE: 97.1 F | SYSTOLIC BLOOD PRESSURE: 96 MMHG | WEIGHT: 131 LBS | DIASTOLIC BLOOD PRESSURE: 61 MMHG

## 2017-12-21 DIAGNOSIS — H91.92 CHANGE IN HEARING OF LEFT EAR: ICD-10-CM

## 2017-12-21 DIAGNOSIS — J32.9 SINUSITIS, UNSPECIFIED CHRONICITY, UNSPECIFIED LOCATION: Primary | ICD-10-CM

## 2017-12-21 PROCEDURE — 99214 OFFICE O/P EST MOD 30 MIN: CPT | Performed by: PHYSICIAN ASSISTANT

## 2017-12-21 RX ORDER — CEFDINIR 300 MG/1
300 CAPSULE ORAL 2 TIMES DAILY
Qty: 20 CAPSULE | Refills: 0 | Status: SHIPPED | OUTPATIENT
Start: 2017-12-21 | End: 2018-10-04

## 2017-12-21 NOTE — MR AVS SNAPSHOT
After Visit Summary   12/21/2017    Brianna Abdalla    MRN: [id number]           Patient Information     Date Of Birth          1980        Visit Information        Provider Department      12/21/2017 8:00 AM Kelin Cisneros PA-C [facility]        Today's Diagnoses     Sinusitis, unspecified chronicity, unspecified location    -  1    Change in hearing of left ear           Follow-ups after your visit        Additional Services     OTOLARYNGOLOGY REFERRAL       Your provider has referred you to: FM[facility]: [facility] [phone]  [email or web address]    Please be aware that coverage of these services is subject to the terms and limitations of your health insurance plan.  Call member services at your health plan with any benefit or coverage questions.      Please bring the following with you to your appointment:    (1) Any X-Rays, CTs or MRIs which have been performed.  Contact the facility where they were done to arrange for  prior to your scheduled appointment.   (2) List of current medications  (3) This referral request   (4) Any documents/labs given to you for this referral                  Who to contact     If you have questions or need follow up information about today's clinic visit or your schedule please contact [facility] directly at [phone].  Normal or non-critical lab and imaging results will be communicated to you by MyChart, letter or phone within 4 business days after the clinic has received the results. If you do not hear from us within 7 days, please contact the clinic through MyChart or phone. If you have a critical or abnormal lab result, we will notify you by phone as soon as possible.  Submit refill requests through [employer] or call your pharmacy and they will forward the refill request to us. Please allow 3 business days for your refill to be completed.          Additional Information  About Your Visit        C[employer] Information     [employer] gives you secure access to your electronic health record. If you see a primary care provider, you can also send messages to your care team and make appointments. If you have questions, please call your primary care clinic.  If you do not have a primary care provider, please call [phone] and they will assist you.        Care EveryWhere ID     This is your Care EveryWhere ID. This could be used by other organizations to access your [place] medical records  [id number]        Your Vitals Were     Pulse Temperature Pulse Oximetry Breastfeeding? BMI (Body Mass Index)       51 97.1  F (36.2  C) (Oral) 100% No 24.75 kg/m2        Blood Pressure from Last 3 Encounters:   12/21/17 96/61   11/03/17 109/67   04/20/17 105/69    Weight from Last 3 Encounters:   12/21/17 131 lb (59.4 kg)   11/03/17 130 lb (59 kg)   04/20/17 121 lb 12.8 oz (55.2 kg)              We Performed the Following     OTOLARYNGOLOGY REFERRAL          Today's Medication Changes          These changes are accurate as of: 12/21/17  8:43 AM.  If you have any questions, ask your nurse or doctor.               Start taking these medicines.        Dose/Directions    cefdinir 300 MG capsule   Commonly known as:  OMNICEF   Used for:  Sinusitis, unspecified chronicity, unspecified location   Started by:  Kelin Cisneros PA-C        Dose:  300 mg   Take 1 capsule (300 mg) by mouth 2 times daily   Quantity:  20 capsule   Refills:  0            Where to get your medicines      [facility] [id number] [place] [address] [address] [facility] [employer] [place]  [address] [address]     Phone:  [phone]     cefdinir 300 MG capsule                Primary Care Provider Office Phone # Fax #    Kati Alaniz [phone] [address] [facility] [address]        Equal Access to Services     DEVORAH MARSH  AH: Aníbalii kashif ardonalyssajustice Yomi, wacandeda luqadaha, qaybta kadesirae myers, emma damien josefmarisa duganrajeshdarren camacho. So [facility] [place] [phone].    ATENCIÓN: Si habla rita, tiene a turner disposición servicios gratuitos de asistencia lingüística. Llame al [phone].    We comply with applicable federal civil rights laws and [place] laws. We do not discriminate on the basis of race, color, national origin, age, disability, sex, sexual orientation, or gender identity.            Thank you!     Thank you for choosing [facility]  for your care. Our goal is always to provide you with excellent care. Hearing back from our patients is one way we can continue to improve our services. Please take a few minutes to complete the written survey that you may receive in the mail after your visit with us. Thank you!             Your Updated Medication List - Protect others around you: Learn how to safely use, store and throw away your medicines at www.disposemymeds.org.          This list is accurate as of: 12/21/17  8:43 AM.  Always use your most recent med list.                   Brand Name Dispense Instructions for use Diagnosis    cefdinir 300 MG capsule    OMNICEF    20 capsule    Take 1 capsule (300 mg) by mouth 2 times daily    Sinusitis, unspecified chronicity, unspecified location       FLUoxetine 20 MG capsule    PROzac    180 capsule    Take 2 capsules (40 mg) by mouth daily    TONO (generalized anxiety disorder), Major depressive disorder, recurrent episode, moderate (H)       neomycin-polymyxin-hydrocortisone 3.5-59891-6 otic suspension    CORTISPORIN    10 mL    Place 4 drops in ear(s) 4 times daily    Infective otitis externa, left

## 2017-12-21 NOTE — NURSING NOTE
"Chief Complaint   Patient presents with     Ear Problem     ear pain per pt on and off for a while now, SX getting worst and having hard time hearing      Facial Pain     Facial pain per pt x 2 weeks     Dizziness     dizziness per pt x 1 weeks       Initial BP 96/61  Pulse 51  Temp 97.1  F (36.2  C) (Oral)  Wt 131 lb (59.4 kg)  SpO2 100%  Breastfeeding? No  BMI 24.75 kg/m2 Estimated body mass index is 24.75 kg/(m^2) as calculated from the following:    Height as of 4/20/17: 5' 1\" (1.549 m).    Weight as of this encounter: 131 lb (59.4 kg).  Medication Reconciliation: complete      Giselle Desai MA    "

## 2018-02-27 ENCOUNTER — TELEPHONE (OUTPATIENT)
Dept: FAMILY MEDICINE | Facility: CLINIC | Age: 38
End: 2018-02-27

## 2018-02-27 NOTE — TELEPHONE ENCOUNTER
Kelin prescribed antibiotic for her ears.  Brianna is currently in [facility].  While she was on the plane, she had blood coming from inside her ears.  She doesn't think her insurance will cover her to be seen where she is at.  Please call to advise.

## 2018-02-27 NOTE — TELEPHONE ENCOUNTER
Patient informed of provider note as below. Patient verbalized understanding    Jo-Ann GUSMANN, RN, CPN

## 2018-02-27 NOTE — TELEPHONE ENCOUNTER
Saw patient in December.  Not recently.  Without seeing her I cant' advice anything, she should be seen in [place] if she can.  Flying may make her symptoms a lot worse but if she does fly take sudaphed 30 min before flying (ask pharmacist its OTC). Then when she returns go straight to ENT sounds like she missed that apt.    Kelin

## 2018-02-27 NOTE — TELEPHONE ENCOUNTER
Patient seen 12/21 for ear issues. Referred to ENT, patient missed appointment in January  Last night on flight had bleeding from ears and ear pain.   Does have some congestion  Still has some pain in right ear after landing, bleeding has stopped.   No dizziness  Patient will be home Friday    Wondering what should she do currently while in [place]    Jo-Ann GUSMANN, RN, CPN  .

## 2018-03-06 ENCOUNTER — OFFICE VISIT (OUTPATIENT)
Dept: OTOLARYNGOLOGY | Facility: CLINIC | Age: 38
End: 2018-03-06
Payer: COMMERCIAL

## 2018-03-06 ENCOUNTER — OFFICE VISIT (OUTPATIENT)
Dept: AUDIOLOGY | Facility: CLINIC | Age: 38
End: 2018-03-06
Payer: COMMERCIAL

## 2018-03-06 VITALS
RESPIRATION RATE: 16 BRPM | OXYGEN SATURATION: 98 % | DIASTOLIC BLOOD PRESSURE: 60 MMHG | BODY MASS INDEX: 25.26 KG/M2 | HEART RATE: 78 BPM | HEIGHT: 61 IN | SYSTOLIC BLOOD PRESSURE: 102 MMHG | WEIGHT: 133.8 LBS

## 2018-03-06 DIAGNOSIS — H69.92 DISORDER OF LEFT EUSTACHIAN TUBE: Primary | ICD-10-CM

## 2018-03-06 DIAGNOSIS — H92.12 OTORRHEA, LEFT: ICD-10-CM

## 2018-03-06 DIAGNOSIS — H92.02 OTALGIA, LEFT: Primary | ICD-10-CM

## 2018-03-06 DIAGNOSIS — R42 DIZZINESS: ICD-10-CM

## 2018-03-06 PROCEDURE — 99243 OFF/OP CNSLTJ NEW/EST LOW 30: CPT | Performed by: OTOLARYNGOLOGY

## 2018-03-06 PROCEDURE — 92557 COMPREHENSIVE HEARING TEST: CPT | Performed by: AUDIOLOGIST

## 2018-03-06 PROCEDURE — 92567 TYMPANOMETRY: CPT | Performed by: AUDIOLOGIST

## 2018-03-06 ASSESSMENT — PAIN SCALES - GENERAL: PAINLEVEL: NO PAIN (0)

## 2018-03-06 NOTE — MR AVS SNAPSHOT
After Visit Summary   3/6/2018    Brianna Abdalla    MRN: [id number]           Patient Information     Date Of Birth          1980        Visit Information        Provider Department      3/6/2018 9:45 AM Remy Cherry MD [facility]        Today's Diagnoses     Otalgia, left    -  1    Otorrhea, left        Dizziness           Follow-ups after your visit        Additional Services     AUDIOLOGY ADULT REFERRAL       Your provider has referred you to: [facility]: [facility] [phone]   [email or web address]    Treatment:  Evaluation & Treatment  Specialty Testing:  Audiogram w/Tymps and Reflexes    Please be aware that coverage of these services is subject to the terms and limitations of your health insurance plan.  Call member services at your health plan with any benefit or coverage questions.      Please bring the following to your appointment:  >>   Any x-rays, CTs or MRIs which have been performed.  Contact the facility where they were done to arrange for  prior to your scheduled appointment.   >>   List of current medications  >>   This referral request   >>   Any documents/labs given to you for this referral                  Who to contact     If you have questions or need follow up information about today's clinic visit or your schedule please contact [facility] directly at [phone].  Normal or non-critical lab and imaging results will be communicated to you by MyChart, letter or phone within 4 business days after the clinic has received the results. If you do not hear from us within 7 days, please contact the clinic through MyChart or phone. If you have a critical or abnormal lab result, we will notify you by phone as soon as possible.  Submit refill requests through [employer] or call your pharmacy and they will forward the refill request to us. Please allow 3 business days for your refill to be completed.     "      Additional Information About Your Visit        MyChart Information     [employer] gives you secure access to your electronic health record. If you see a primary care provider, you can also send messages to your care team and make appointments. If you have questions, please call your primary care clinic.  If you do not have a primary care provider, please call [phone] and they will assist you.        Care EveryWhere ID     This is your Care EveryWhere ID. This could be used by other organizations to access your [place] medical records  [id number]        Your Vitals Were     Pulse Respirations Height Pulse Oximetry BMI (Body Mass Index)       78 16 1.549 m (5' 1\") 98% 25.28 kg/m2        Blood Pressure from Last 3 Encounters:   03/06/18 102/60   12/21/17 96/61   11/03/17 109/67    Weight from Last 3 Encounters:   03/06/18 60.7 kg (133 lb 12.8 oz)   12/21/17 59.4 kg (131 lb)   11/03/17 59 kg (130 lb)              We Performed the Following     AUDIOLOGY ADULT REFERRAL        Primary Care Provider Office Phone # Fax #    Kati Alaniz [phone] [address] [facility] [place] [address]        Equal Access to Services     DEVORAH MARSH : Hadii aad ku hadasho Soomaali, waaxda luqadaha, qaybta kaalmada adeegyada, waxay maikelin hayjackelinen kathy camacho. So [facility] [phone].    ATENCIÓN: Si habla español, tiene a turner disposición servicios gratuitos de asistencia lingüística. Llmaria victoria al [phone].    We comply with applicable federal civil rights laws and [place] laws. We do not discriminate on the basis of race, color, national origin, age, disability, sex, sexual orientation, or gender identity.            Thank you!     Thank you for choosing [facility]  for your care. Our goal is always to provide you with excellent care. Hearing back from our patients is one way we can continue to improve our services. Please take a few minutes to complete the written survey that you " may receive in the mail after your visit with us. Thank you!             Your Updated Medication List - Protect others around you: Learn how to safely use, store and throw away your medicines at www.disposemymeds.org.          This list is accurate as of 3/6/18  1:20 PM.  Always use your most recent med list.                   Brand Name Dispense Instructions for use Diagnosis    cefdinir 300 MG capsule    OMNICEF    20 capsule    Take 1 capsule (300 mg) by mouth 2 times daily    Sinusitis, unspecified chronicity, unspecified location       FLUoxetine 20 MG capsule    PROzac    180 capsule    Take 2 capsules (40 mg) by mouth daily    TONO (generalized anxiety disorder), Major depressive disorder, recurrent episode, moderate (H)       neomycin-polymyxin-hydrocortisone 3.5-89791-8 otic suspension    CORTISPORIN    10 mL    Place 4 drops in ear(s) 4 times daily    Infective otitis externa, left

## 2018-03-06 NOTE — PROGRESS NOTES
"AUDIOLOGY REPORT:    Patient was referred to Audiology from ENT by Dr. Cherry for a hearing examination. Patient reports blood draining from the left ear after a flight. Patient reports she feels like she is \"talking under water\". Patient reports short duration, 2 min or less, of dizziness when she is at work. She reports the episodes of dizziness are random in occurrence.     Testing:    Otoscopy:   Otoscopic exam indicates ears are clear of cerumen bilaterally    NOTE: there is a mass of dried blood in the inferior section of the left ear canal.     Tympanograms:    RIGHT: normal eardrum mobility     LEFT:   normal eardrum mobility    Thresholds:   Pure Tone Thresholds assessed using conventional audiometry with good  reliability from 250-8000 Hz bilaterally using insert earphones     RIGHT:  normal hearing sensitivity for all frequencies tested.     LEFT:    normal hearing sensitivity for all frequencies tested.     Speech Reception Threshold:    RIGHT: 15 dB HL    LEFT:   10 dB HL    Word Recognition Score:     RIGHT: 100% at 50 dB HL using NU-6 recorded word list.    LEFT:   100% at 50 dB HL using NU-6 recorded word list.    Discussed results with the patient.     Patient was returned to ENT for follow up.     Harman Sy CCC-A  Licensed Audiologist  3/6/2018                                            "

## 2018-03-06 NOTE — MR AVS SNAPSHOT
After Visit Summary   2/16/2017    Brianna Abdalla    MRN: [id number]           Patient Information     Date Of Birth          1980        Visit Information        Provider Department      2/16/2017 11:00 AM Annie Jones [facility]        Today's Diagnoses     Major depressive disorder, recurrent episode, moderate (H)    -  1    TONO (generalized anxiety disorder)           Follow-ups after your visit        Who to contact     If you have questions or need follow up information about today's clinic visit or your schedule please contact [facility] directly at [phone].  Normal or non-critical lab and imaging results will be communicated to you by MySupportAssistanthart, letter or phone within 4 business days after the clinic has received the results. If you do not hear from us within 7 days, please contact the clinic through [employer] or phone. If you have a critical or abnormal lab result, we will notify you by phone as soon as possible.  Submit refill requests through [employer] or call your pharmacy and they will forward the refill request to us. Please allow 3 business days for your refill to be completed.          Additional Information About Your Visit        MyChart Information     [employer] gives you secure access to your electronic health record. If you see a primary care provider, you can also send messages to your care team and make appointments. If you have questions, please call your primary care clinic.  If you do not have a primary care provider, please call [phone] and they will assist you.        Care EveryWhere ID     This is your Care EveryWhere ID. This could be used by other organizations to access your [facility] medical records  [id number]         Blood Pressure from Last 3 Encounters:   02/02/17 104/63   01/03/17 111/68   12/01/16 133/82    Weight from Last 3 Encounters:   02/02/17 54.9 kg (121 lb)   01/03/17 56.7 kg (125 lb)   12/01/16 55.5 kg (122  lb 6.4 oz)              Today, you had the following     No orders found for display       Primary Care Provider Office Phone # Fax #    Kati Alaniz [phone]       [facility] [address] [address] [facility] [address]        Thank you!     Thank you for choosing [facility]  for your care. Our goal is always to provide you with excellent care. Hearing back from our patients is one way we can continue to improve our services. Please take a few minutes to complete the written survey that you may receive in the mail after your visit with us. Thank you!             Your Updated Medication List - Protect others around you: Learn how to safely use, store and throw away your medicines at www.disposemymeds.org.          This list is accurate as of: 2/16/17 11:59 PM.  Always use your most recent med list.                   Brand Name Dispense Instructions for use    * FLUoxetine 10 MG capsule    PROzac    30 capsule    Take 1 capsule (10 mg) by mouth daily       * FLUoxetine 20 MG capsule    PROzac    30 capsule    Take 1 capsule (20 mg) by mouth daily       * Notice:  This list has 2 medication(s) that are the same as other medications prescribed for you. Read the directions carefully, and ask your doctor or other care provider to review them with you.       06-Mar-2018 08:52

## 2018-03-06 NOTE — MR AVS SNAPSHOT
After Visit Summary   3/6/2018    Brianna Abdalla    MRN: [id number]           Patient Information     Date Of Birth          1980        Visit Information        Provider Department      3/6/2018 9:15 AM [facility]        Today's Diagnoses     Disorder of left eustachian tube    -  1    Dizziness           Follow-ups after your visit        Who to contact     If you have questions or need follow up information about today's clinic visit or your schedule please contact [facility] directly at [phone].  Normal or non-critical lab and imaging results will be communicated to you by Pro V&Vhart, letter or phone within 4 business days after the clinic has received the results. If you do not hear from us within 7 days, please contact the clinic through [employer] or phone. If you have a critical or abnormal lab result, we will notify you by phone as soon as possible.  Submit refill requests through [employer] or call your pharmacy and they will forward the refill request to us. Please allow 3 business days for your refill to be completed.          Additional Information About Your Visit        MyChart Information     [employer] gives you secure access to your electronic health record. If you see a primary care provider, you can also send messages to your care team and make appointments. If you have questions, please call your primary care clinic.  If you do not have a primary care provider, please call [phone] and they will assist you.        Care EveryWhere ID     This is your Care EveryWhere ID. This could be used by other organizations to access your [facility] medical records  [id number]         Blood Pressure from Last 3 Encounters:   03/06/18 102/60   12/21/17 96/61   11/03/17 109/67    Weight from Last 3 Encounters:   03/06/18 133 lb 12.8 oz (60.7 kg)   12/21/17 131 lb (59.4 kg)   11/03/17 130 lb (59 kg)              We Performed the Following      AUDIOGRAM/TYMPANOGRAM - INTERFACE     COMPREHENSIVE HEARING TEST     TYMPANOMETRY        Primary Care Provider Office Phone # Fax #    Kati Alaniz [phone] [address] [address] [facility] [address]        Equal Access to Services     DEVORAH MARSH : Hadii kashif ku hadalyssao Soomaali, waaxda luqadaha, qaybta kaalmada adeegyada, waxmio idiin hayjackelinen adeferdinand montanez laMariannbeth camacho. So [facility] [phone].    ATENCIÓN: Si habla español, tiene a turner disposición servicios gratuitos de asistencia lingüística. Llame al [phone].    We comply with applicable federal civil rights laws and [place] laws. We do not discriminate on the basis of race, color, national origin, age, disability, sex, sexual orientation, or gender identity.            Thank you!     Thank you for choosing [facility]  for your care. Our goal is always to provide you with excellent care. Hearing back from our patients is one way we can continue to improve our services. Please take a few minutes to complete the written survey that you may receive in the mail after your visit with us. Thank you!             Your Updated Medication List - Protect others around you: Learn how to safely use, store and throw away your medicines at www.disposemymeds.org.          This list is accurate as of 3/6/18 10:43 AM.  Always use your most recent med list.                   Brand Name Dispense Instructions for use Diagnosis    cefdinir 300 MG capsule    OMNICEF    20 capsule    Take 1 capsule (300 mg) by mouth 2 times daily    Sinusitis, unspecified chronicity, unspecified location       FLUoxetine 20 MG capsule    PROzac    180 capsule    Take 2 capsules (40 mg) by mouth daily    TONO (generalized anxiety disorder), Major depressive disorder, recurrent episode, moderate (H)       neomycin-polymyxin-hydrocortisone [id number] otic suspension    CORTISPORIN    10 mL    Place 4 drops in ear(s) 4 times daily    Infective otitis externa, left

## 2018-03-06 NOTE — PROGRESS NOTES
I am seeing this patient in consultation for ear pain and hearing change at the request of the provider Kelin Cisneros.    Chief Complaint - Dizziness, ear pain    History of Present Illness - Brianna Abdalla is a 37 year old female who presents with dizziness. Also has been having pain in the ears. Left side more so. Was seen by primary twice in last 3 months. Once had drops and once had a sinus infection. Then went on a flight and had left ear bleeding mid flight. Has had intermittent ear pain. She also has a cold now. No more ear drainage. Bleeding was only once. The patient describes dizziness with standing at work. Lasts for a minute or two. It seemed random. It started a few months ago. It wasn't necessarily with head movements. She did feel like things were moving. The patient denies nausea. Was worse around rafiq, but not as bad now. Feels sometimes she has a hard time hearing. Has some noise exposure at work due to noise at work. sometimes has ear pain at night. She points to in front of ear. Had a prior tympanic membrane perforation as a child with infections. No surgery.     Past Medical History -   Patient Active Problem List   Diagnosis     CARDIOVASCULAR SCREENING; LDL GOAL LESS THAN 160     Abnormal Pap smear of cervix     Thrombocytopenia (H)     BCC (basal cell carcinoma), face     Major depressive disorder, recurrent episode, moderate (H)     TONO (generalized anxiety disorder)       Current Medications -   Current Outpatient Prescriptions:      FLUoxetine (PROZAC) 20 MG capsule, Take 2 capsules (40 mg) by mouth daily, Disp: 180 capsule, Rfl: 1     cefdinir (OMNICEF) 300 MG capsule, Take 1 capsule (300 mg) by mouth 2 times daily (Patient not taking: Reported on 3/6/2018), Disp: 20 capsule, Rfl: 0     neomycin-polymyxin-hydrocortisone (CORTISPORIN) 3.5-63055-9 otic suspension, Place 4 drops in ear(s) 4 times daily (Patient not taking: Reported on 3/6/2018), Disp: 10 mL, Rfl: 0    Allergies -  "  Allergies   Allergen Reactions     Amoxicillin      Iodine I 131 Tositumomab        Social History -   Social History     Social History     Marital status: Single     Spouse name: N/A     Number of children: N/A     Years of education: N/A     Social History Main Topics     Smoking status: Never Smoker     Smokeless tobacco: Never Used     Alcohol use No     Drug use: No     Sexual activity: Yes     Partners: Male     Other Topics Concern     None     Social History Narrative       Family History -   Family History   Problem Relation Age of Onset     DIABETES Maternal Grandmother      DIABETES Brother        Review of Systems - As per HPI and PMHx, otherwise 7 system review of the head and neck negative.    Physical Exam  /60  Pulse 78  Resp 16  Ht 1.549 m (5' 1\")  Wt 60.7 kg (133 lb 12.8 oz)  SpO2 98%  BMI 25.28 kg/m2  General - The patient is in no distress.  Alert and oriented x3, answers questions and cooperates with examination appropriately.   Voice and Breathing - The patient was breathing comfortably without the use of accessory muscles. There was no wheezing, stridor, or stertor.  The patients voice was clear and strong, with no dysphonia.    Eyes - Extraocular movements intact. Sclera were not icteric or injected, conjunctiva were pink and moist. No nystagmus.  Neurologic - Cranial nerves II-XII are grossly intact. Specifically, the facial nerve is intact, House-Brackmann grade 1 of 6.   Neck -  Palpation of the occipital, submental, submandibular, internal jugular chain, and supraclavicular nodes did not demonstrate any abnormal lymph nodes or masses. The parotid glands were without masses. Some pain in front of ears on palpation.   Ears - The auricles appeared normal. The left external auditory canal had dried blood. I tried to remove this from the ear canal, but it was plastered to the medial canal and partly on the tympanic membrane. The tympanic membranes are otherwise normal in " appearance, bony landmarks are intact.  No retraction, perforation, or masses.      Audiologic Studies - An audiogram and tympanogram were performed today as part of the evaluation and personally reviewed. The tympanogram shows normal Type A curves, with normal canal volumes and middle ear pressures.  There is no sign of eustachian tube dysfunction or middle ear effusion.  The audiogram was also normal.     A/P - Brianna Abdalla is a 37 year old female with a few ear symptoms - pain, bloody otorrhea, and dizziness. I think she had ETD a few months ago causing ear pressure, pain and hearing changes. Then she flew and likely ruptured the left tympanic membrane. It has healed, but she has some dried blood in the ear canal. I couldn't remove it, but with time, it should work its way out. Otherwise, she can try debrox or hydrogen peroxide in the left ear for a week and return. This will soften the blood for removal. Her dizziness may have been due to the ear problems/ETD. It is better. Her hearing is back to normal. I wouldn't do anything more about the dizziness unless it persists or certainly worsens.          Remy Cherry MD  Otolaryngology  [facility]

## 2018-03-06 NOTE — LETTER
3/6/2018         RE: Brianna Abdalla  [id number] [facility] [address]        Dear Colleague,    Thank you for referring your patient, Brianna Abdalla, to the [facility]. Please see a copy of my visit note below.    I am seeing this patient in consultation for ear pain and hearing change at the request of the provider Kelin Cisneros.    Chief Complaint - Dizziness, ear pain    History of Present Illness - Brianna Abdalla is a 37 year old female who presents with dizziness. Also has been having pain in the ears. Left side more so. Was seen by primary twice in last 3 months. Once had drops and once had a sinus infection. Then went on a flight and had left ear bleeding mid flight. Has had intermittent ear pain. She also has a cold now. No more ear drainage. Bleeding was only once. The patient describes dizziness with standing at work. Lasts for a minute or two. It seemed random. It started a few months ago. It wasn't necessarily with head movements. She did feel like things were moving. The patient denies nausea. Was worse around rafiq, but not as bad now. Feels sometimes she has a hard time hearing. Has some noise exposure at work due to noise at work. sometimes has ear pain at night. She points to in front of ear. Had a prior tympanic membrane perforation as a child with infections. No surgery.     Past Medical History -   Patient Active Problem List   Diagnosis     CARDIOVASCULAR SCREENING; LDL GOAL LESS THAN 160     Abnormal Pap smear of cervix     Thrombocytopenia (H)     BCC (basal cell carcinoma), face     Major depressive disorder, recurrent episode, moderate (H)     TONO (generalized anxiety disorder)       Current Medications -   Current Outpatient Prescriptions:      FLUoxetine (PROZAC) 20 MG capsule, Take 2 capsules (40 mg) by mouth daily, Disp: 180 capsule, Rfl: 1     cefdinir (OMNICEF) 300 MG capsule, Take 1 capsule (300 mg) by mouth 2 times daily (Patient not taking:  "Reported on 3/6/2018), Disp: 20 capsule, Rfl: 0     neomycin-polymyxin-hydrocortisone (CORTISPORIN) 3.5-20273-0 otic suspension, Place 4 drops in ear(s) 4 times daily (Patient not taking: Reported on 3/6/2018), Disp: 10 mL, Rfl: 0    Allergies -   Allergies   Allergen Reactions     Amoxicillin      Iodine I 131 Tositumomab        Social History -   Social History     Social History     Marital status: Single     Spouse name: N/A     Number of children: N/A     Years of education: N/A     Social History Main Topics     Smoking status: Never Smoker     Smokeless tobacco: Never Used     Alcohol use No     Drug use: No     Sexual activity: Yes     Partners: Male     Other Topics Concern     None     Social History Narrative       Family History -   Family History   Problem Relation Age of Onset     DIABETES Maternal Grandmother      DIABETES Brother        Review of Systems - As per HPI and PMHx, otherwise 7 system review of the head and neck negative.    Physical Exam  /60  Pulse 78  Resp 16  Ht 1.549 m (5' 1\")  Wt 60.7 kg (133 lb 12.8 oz)  SpO2 98%  BMI 25.28 kg/m2  General - The patient is in no distress.  Alert and oriented x3, answers questions and cooperates with examination appropriately.   Voice and Breathing - The patient was breathing comfortably without the use of accessory muscles. There was no wheezing, stridor, or stertor.  The patients voice was clear and strong, with no dysphonia.    Eyes - Extraocular movements intact. Sclera were not icteric or injected, conjunctiva were pink and moist. No nystagmus.  Neurologic - Cranial nerves II-XII are grossly intact. Specifically, the facial nerve is intact, House-Brackmann grade 1 of 6.   Neck -  Palpation of the occipital, submental, submandibular, internal jugular chain, and supraclavicular nodes did not demonstrate any abnormal lymph nodes or masses. The parotid glands were without masses. Some pain in front of ears on palpation.   Ears - The " auricles appeared normal. The left external auditory canal had dried blood. I tried to remove this from the ear canal, but it was plastered to the medial canal and partly on the tympanic membrane. The tympanic membranes are otherwise normal in appearance, bony landmarks are intact.  No retraction, perforation, or masses.      Audiologic Studies - An audiogram and tympanogram were performed today as part of the evaluation and personally reviewed. The tympanogram shows normal Type A curves, with normal canal volumes and middle ear pressures.  There is no sign of eustachian tube dysfunction or middle ear effusion.  The audiogram was also normal.     A/P - Brianna Abdalla is a 37 year old female with a few ear symptoms - pain, bloody otorrhea, and dizziness. I think she had ETD a few months ago causing ear pressure, pain and hearing changes. Then she flew and likely ruptured the left tympanic membrane. It has healed, but she has some dried blood in the ear canal. I couldn't remove it, but with time, it should work its way out. Otherwise, she can try debrox or hydrogen peroxide in the left ear for a week and return. This will soften the blood for removal. Her dizziness may have been due to the ear problems/ETD. It is better. Her hearing is back to normal. I wouldn't do anything more about the dizziness unless it persists or certainly worsens.          Remy Cherry MD  Otolaryngology  [facility]      Again, thank you for allowing me to participate in the care of your patient.        Sincerely,        Remy Cherry MD

## 2018-10-04 ENCOUNTER — RADIANT APPOINTMENT (OUTPATIENT)
Dept: GENERAL RADIOLOGY | Facility: CLINIC | Age: 38
End: 2018-10-04
Attending: FAMILY MEDICINE
Payer: COMMERCIAL

## 2018-10-04 ENCOUNTER — OFFICE VISIT (OUTPATIENT)
Dept: FAMILY MEDICINE | Facility: CLINIC | Age: 38
End: 2018-10-04
Payer: COMMERCIAL

## 2018-10-04 VITALS
DIASTOLIC BLOOD PRESSURE: 66 MMHG | TEMPERATURE: 97 F | WEIGHT: 133 LBS | BODY MASS INDEX: 25.13 KG/M2 | OXYGEN SATURATION: 100 % | RESPIRATION RATE: 16 BRPM | HEART RATE: 61 BPM | SYSTOLIC BLOOD PRESSURE: 101 MMHG

## 2018-10-04 DIAGNOSIS — M89.8X1 PAIN OF RIGHT SCAPULA: Primary | ICD-10-CM

## 2018-10-04 DIAGNOSIS — M54.2 NECK PAIN: ICD-10-CM

## 2018-10-04 DIAGNOSIS — M89.8X1 PAIN OF RIGHT SCAPULA: ICD-10-CM

## 2018-10-04 PROCEDURE — 72040 X-RAY EXAM NECK SPINE 2-3 VW: CPT | Mod: FY

## 2018-10-04 PROCEDURE — 99213 OFFICE O/P EST LOW 20 MIN: CPT | Performed by: FAMILY MEDICINE

## 2018-10-04 RX ORDER — DICLOFENAC SODIUM 75 MG/1
75 TABLET, DELAYED RELEASE ORAL 2 TIMES DAILY WITH MEALS
Qty: 30 TABLET | Refills: 1 | Status: SHIPPED | OUTPATIENT
Start: 2018-10-04 | End: 2019-01-24

## 2018-10-04 RX ORDER — METHOCARBAMOL 500 MG/1
1000 TABLET, FILM COATED ORAL 3 TIMES DAILY PRN
Qty: 30 TABLET | Refills: 1 | Status: SHIPPED | OUTPATIENT
Start: 2018-10-04 | End: 2019-01-24

## 2018-10-04 ASSESSMENT — PAIN SCALES - GENERAL: PAINLEVEL: NO PAIN (0)

## 2018-10-04 NOTE — NURSING NOTE
"Chief Complaint   Patient presents with     Musculoskeletal Problem     right shoulder/blade pain - no known injury - but does repetitive work - nausea - some tingling and numbness x 2 weeks        Initial /66  Pulse 61  Temp 97  F (36.1  C) (Oral)  Resp 16  Wt 133 lb (60.3 kg)  LMP 09/18/2018  SpO2 100%  BMI 25.13 kg/m2 Estimated body mass index is 25.13 kg/(m^2) as calculated from the following:    Height as of 3/6/18: 5' 1\" (1.549 m).    Weight as of this encounter: 133 lb (60.3 kg).  Medication Reconciliation: complete  Jes Woods M.A.    "

## 2018-10-04 NOTE — MR AVS SNAPSHOT
After Visit Summary   10/4/2018    Brianna Abdalla    MRN: [id number]           Patient Information     Date Of Birth          1980        Visit Information        Provider Department      10/4/2018 9:20 AM Yvon Mc MD [facility]        Today's Diagnoses     Pain of right scapula    -  1    Neck pain           Follow-ups after your visit        Additional Services     [facility], HAND, AND CHIROPRACTIC REFERRAL       Physical Therapy, Hand Therapy and Chiropractic Care are available through:  *[facility]  *Hand Therapy (Occupational Therapy or Physical Therapy)  *[facility]    Call one number to schedule at any of the above locations: [phone].    Physical therapy, Hand therapy and/or Chiropractic care has been recommended by your physician as an excellent treatment option to reduce pain and help people return to normal activities, including sports.  Therapy and/or chiropractic care services are a great complement or alternative to expensive and invasive surgery, injections, or long-term use of prescription medications. The primary goal is to identify the underlying problem and provide you the tools to manage your condition on your own.     Please be aware that coverage of these services is subject to the terms and limitations of your health insurance plan.  Call member services at your health plan with any benefit or coverage questions.      Please bring the following to your appointment:  *Your personal calendar for scheduling future appointments  *Comfortable clothing                  Future tests that were ordered for you today     Open Future Orders        Priority Expected Expires Ordered    [facility] PT, HAND, AND CHIROPRACTIC REFERRAL Routine  10/4/2019 10/4/2018            Who to contact     If you have questions or need follow up information about today's clinic visit or your schedule please contact [facility] directly at [phone].  Normal or non-critical lab and imaging results will be communicated to you by [facility], letter or phone within 4 business days after the clinic has received the results. If you do not hear from us within 7 days, please contact the clinic through [employer] or phone. If you have a critical or abnormal lab result, we will notify you by phone as soon as possible.  Submit refill requests through [employer] or call your pharmacy and they will forward the refill request to us. Please allow 3 business days for your refill to be completed.          Additional Information About Your Visit        [facility] Information     [employer] gives you secure access to your electronic health record. If you see a primary care provider, you can also send messages to your care team and make appointments. If you have questions, please call your primary care clinic.  If you do not have a primary care provider, please call [phone] and they will assist you.        Care EveryWhere ID     This is your Care EveryWhere ID. This could be used by other organizations to access your [place] medical records  [id number]        Your Vitals Were     Pulse Temperature Respirations Last Period Pulse Oximetry BMI (Body Mass Index)    61 97  F (36.1  C) (Oral) 16 09/18/2018 100% 25.13 kg/m2       Blood Pressure from Last 3 Encounters:   10/04/18 101/66   03/06/18 102/60   12/21/17 96/61    Weight from Last 3 Encounters:   10/04/18 133 lb (60.3 kg)   03/06/18 133 lb 12.8 oz (60.7 kg)   12/21/17 131 lb (59.4 kg)                 Today's Medication Changes          These changes are accurate as of 10/4/18 10:33 AM.  If you have any questions, ask your nurse or doctor.               Start taking these medicines.        Dose/Directions    diclofenac 75 MG EC tablet   Commonly known as:  VOLTAREN   Used for:  Neck pain   Started by:  Yvon Mc MD        Dose:  75 mg   Take 1 tablet (75 mg) by mouth 2 times daily (with meals)    Quantity:  30 tablet   Refills:  1       methocarbamol 500 MG tablet   Commonly known as:  ROBAXIN   Used for:  Neck pain   Started by:  Yvon Mc MD        Dose:  1000 mg   Take 2 tablets (1,000 mg) by mouth 3 times daily as needed for muscle spasms   Quantity:  30 tablet   Refills:  1            Where to get your medicines      These medications were sent to [facility] [address] [place] [address] [address] [place] [address] AT [address]  [address] [facility] [address]     Phone:  [phone]     diclofenac 75 MG EC tablet    methocarbamol 500 MG tablet                Primary Care Provider Office Phone # Fax #    Kati Alaniz [phone] [address] [facility] [address]        Equal Access to Services     [facility] Center[facility] : Hadii kashif sharp hadasho Soomaali, waaxda luqadaha, qaybta kaalmada adeegyada, emma quezada haybeth enriquez . [facility] [phone].    ATENCIÓN: Si habla español, tiene a turner disposición servicios gratuitos de asistencia lingüística. [facility] [phone].    We comply with applicable federal civil rights laws and [place] laws. We do not discriminate on the basis of race, color, national origin, age, disability, sex, sexual orientation, or gender identity.            Thank you!     Thank you for choosing [facility]  for your care. Our goal is always to provide you with excellent care. Hearing back from our patients is one way we can continue to improve our services. Please take a few minutes to complete the written survey that you may receive in the mail after your visit with us. Thank you!             Your Updated Medication List - Protect others around you: Learn how to safely use, store and throw away your medicines at www.disposemymeds.org.          This list is accurate as of 10/4/18 10:33 AM.  Always use your most recent med list.                   Brand Name Dispense Instructions for use  Diagnosis    diclofenac 75 MG EC tablet    VOLTAREN    30 tablet    Take 1 tablet (75 mg) by mouth 2 times daily (with meals)    Neck pain       FLUoxetine 20 MG capsule    PROzac    180 capsule    Take 2 capsules (40 mg) by mouth daily    TONO (generalized anxiety disorder), Major depressive disorder, recurrent episode, moderate (H)       methocarbamol 500 MG tablet    ROBAXIN    30 tablet    Take 2 tablets (1,000 mg) by mouth 3 times daily as needed for muscle spasms    Neck pain       neomycin-polymyxin-hydrocortisone [id number] otic suspension    CORTISPORIN    10 mL    Place 4 drops in ear(s) 4 times daily    Infective otitis externa, left

## 2018-10-04 NOTE — PROGRESS NOTES
CHIEF COMPLAINT    Pain in right scapular region.      HISTORY    Brianna has been bothered by aching down over the right scapula.  She also notices neck stiffness and some lower right neck discomfort.  She has been taking ibuprofen or Excedrin.  She is tried some heat.  She tried a massage but this seemed to make things worse.  She is not describing radicular type pain extending into her upper extremities.  No numbness or weakness.    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    No history of severe neck injury.      Patient Active Problem List   Diagnosis     CARDIOVASCULAR SCREENING; LDL GOAL LESS THAN 160     Abnormal Pap smear of cervix     Thrombocytopenia (H)     BCC (basal cell carcinoma), face     Major depressive disorder, recurrent episode, moderate (H)     TONO (generalized anxiety disorder)       REVIEW OF SYSTEMS    No fevers.  No congestion or cough.  No chest or abdominal pain.  No rashes.      Past Medical History:   Diagnosis Date     H/O colposcopy with cervical biopsy 7/26/2001    Negative for dysplasia       EXAM  /66  Pulse 61  Temp 97  F (36.1  C) (Oral)  Resp 16  Wt 133 lb (60.3 kg)  LMP 09/18/2018  SpO2 100%  BMI 25.13 kg/m2    Well-appearing 38-year-old woman.  HEENT unremarkable.  Neck exam shows diminished extension and diminished side bending.  Rotation is fairly good.  She has a negative compression test on the right.  Neck appears tender.  Back and shoulder.  No palpable tenderness redness rash over the region of right scapula.      XR CERVICAL SPINE 2/3 VWS 10/4/2018 10:07 AM     COMPARISON: None.     HISTORY: Neck pain.         IMPRESSION: Straightening of the normal cervical lordosis, may be  positional in nature. Vertebral heights and disc spaces are preserved  without evidence of fracture. No listhesis or prevertebral soft tissue  swelling.     REJI UMANA MD       (M89.8X1) Pain  of right scapula  (primary encounter diagnosis)  Comment: Doubt cervical radiculopathy at this point.  Plan: XR Cervical Spine 2/3 Views, [facility], HAND, AND         CHIROPRACTIC REFERRAL            (M54.2) Neck pain  Comment:   Suspect more neck pain due to muscular and ligamentous tenderness.  Some of this may be related to her job activity.  Muscle relaxer, anti-inflammatory and therapy were advised.  She has a few stretches she has been looking at and should do those.  Follow-up advised if not improving.    Plan: XR Cervical Spine 2/3 Views, methocarbamol         (ROBAXIN) 500 MG tablet, diclofenac (VOLTAREN)         75 MG EC tablet, [facility] PT, HAND, AND CHIROPRACTIC        REFERRAL

## 2019-01-24 ENCOUNTER — OFFICE VISIT (OUTPATIENT)
Dept: FAMILY MEDICINE | Facility: CLINIC | Age: 39
End: 2019-01-24
Payer: COMMERCIAL

## 2019-01-24 VITALS
TEMPERATURE: 98.7 F | SYSTOLIC BLOOD PRESSURE: 107 MMHG | OXYGEN SATURATION: 100 % | RESPIRATION RATE: 14 BRPM | WEIGHT: 130 LBS | BODY MASS INDEX: 24.55 KG/M2 | HEART RATE: 77 BPM | DIASTOLIC BLOOD PRESSURE: 70 MMHG | HEIGHT: 61 IN

## 2019-01-24 DIAGNOSIS — R21 RASH: Primary | ICD-10-CM

## 2019-01-24 PROCEDURE — 99213 OFFICE O/P EST LOW 20 MIN: CPT | Performed by: FAMILY MEDICINE

## 2019-01-24 RX ORDER — CEPHALEXIN 500 MG/1
500 CAPSULE ORAL 2 TIMES DAILY
Qty: 14 CAPSULE | Refills: 0 | Status: SHIPPED | OUTPATIENT
Start: 2019-01-24 | End: 2019-05-19

## 2019-01-24 RX ORDER — TRIAMCINOLONE ACETONIDE 0.25 MG/G
OINTMENT TOPICAL 2 TIMES DAILY PRN
Qty: 15 G | Refills: 0 | Status: SHIPPED | OUTPATIENT
Start: 2019-01-24 | End: 2019-10-24

## 2019-01-24 ASSESSMENT — MIFFLIN-ST. JEOR: SCORE: 1212.06

## 2019-01-24 NOTE — PROGRESS NOTES
"SUBJECTIVE:  Brianna Abdalla, a 38 year old female scheduled an appointment to discuss the following issues:  Eyebrow microblading. left doing ok. right sore/swelling. Done about one weeks. Did ok second time.  [place] No fevers or chills. Slowly worse.   No history MRSA.         Past Medical History:   Diagnosis Date     H/O colposcopy with cervical biopsy 7/26/2001    Negative for dysplasia       Past Surgical History:   Procedure Laterality Date     LEEP TX, CERVICAL      ARNOLDO 2/3       Family History   Problem Relation Age of Onset     Diabetes Maternal Grandmother      Diabetes Brother        Social History     Tobacco Use     Smoking status: Never Smoker     Smokeless tobacco: Never Used   Substance Use Topics     Alcohol use: No       ROS:  All other ROS negative    OBJECTIVE:  /70   Pulse 77   Temp 98.7  F (37.1  C) (Oral)   Resp 14   Ht 1.549 m (5' 1\")   Wt 59 kg (130 lb)   SpO2 100%   BMI 24.56 kg/m    EXAM:  GENERAL APPEARANCE: healthy, alert and no distress  EYES: EOMI,  PERRL  HENT: ear canals and TM's normal and nose and mouth without ulcers or lesions  NECK: no adenopathy, no asymmetry, masses, or scars and thyroid normal to palpation  RESP: lungs clear to auscultation - no rales, rhonchi or wheezes  CV: regular rates and rhythm, normal S1 S2, no S3 or S4 and no murmur, click or rub -  SKIN: mild redness/tenderness superior to right eyebrow. No warmth.   PSYCH: mentation appears normal and affect normal/bright    ASSESSMENT / PLAN:  (R21) Rash  (primary encounter diagnosis)  Comment: irritation vs early infection  Plan: cephALEXin (KEFLEX) 500 MG capsule,         triamcinolone (KENALOG) 0.025 % external         ointment        At this point patient will do the cream and ice and avoid scratching. If worsening pain/swelling/ fevers or chills start keflex. omnicef ok in past. Reveiwed risks and side effects of medication  Expected course and warning signs reviewed. Call/email " with questions/concerns. To ER if markedly worsening swelling/pain/etc.     Demetri Dunn

## 2019-05-22 NOTE — PROGRESS NOTES
SUBJECTIVE:   CC: Brianna Abdalla is an 38 year old woman who presents for preventive health visit.     Healthy Habits:     Getting at least 3 servings of Calcium per day:  Yes    Bi-annual eye exam:  Yes    Dental care twice a year:  Yes    Sleep apnea or symptoms of sleep apnea:  Daytime drowsiness    Diet:  Regular (no restrictions)    Frequency of exercise:  2-3 days/week    Duration of exercise:  45-60 minutes    Taking medications regularly:  Yes    Medication side effects:  None    PHQ-2 Total Score: 2    Additional concerns today:  No      Patient also would like fluoxetine refilled, has not been taking for awhile.  Weaned off last summer but finds her anxiety and depression symptoms have returned  Please see phq-9 and Paul-7 for details. Has been on 40 mg of prozac in the past but felt it was too much so will start her at 20 mg. Pt to let me know in one month how she is doing    Also would like 2 moles looked at, hx of skin cancer.  Recommend she follow-up with her dermatologist    Today's PHQ-2 Score:   PHQ-2 ( 1999 Pfizer) 5/23/2019   Q1: Little interest or pleasure in doing things 1   Q2: Feeling down, depressed or hopeless 1   PHQ-2 Score 2   Q1: Little interest or pleasure in doing things Several days   Q2: Feeling down, depressed or hopeless Several days   PHQ-2 Score 2       Abuse: Current or Past(Physical, Sexual or Emotional)- No  Do you feel safe in your environment? Yes    Social History     Tobacco Use     Smoking status: Never Smoker     Smokeless tobacco: Never Used   Substance Use Topics     Alcohol use: No     If you drink alcohol do you typically have >3 drinks per day or >7 drinks per week? No    Alcohol Use 5/23/2019   Prescreen: >3 drinks/day or >7 drinks/week? No   Prescreen: >3 drinks/day or >7 drinks/week? -       Reviewed orders with patient.  Reviewed health maintenance and updated orders accordingly - Yes  Labs reviewed in EPIC    Mammogram not appropriate for this patient based  on age.    Pertinent mammograms are reviewed under the imaging tab.  History of abnormal Pap smear: NO - age 30-65 PAP every 5 years with negative HPV co-testing recommended  PAP / HPV Latest Ref Rng & Units 8/23/2016 4/14/2015 4/4/2013   PAP - NIL NIL NIL   HPV 16 DNA NEG Negative Negative -   HPV 18 DNA NEG Negative Negative -   OTHER HR HPV NEG Negative Negative -     Reviewed and updated as needed this visit by clinical staff         Reviewed and updated as needed this visit by Provider            Review of Systems   HENT: Positive for ear pain.    Breasts:  Negative for tenderness, breast mass and discharge.   Genitourinary: Negative for pelvic pain, vaginal bleeding and vaginal discharge.   Psychiatric/Behavioral: Positive for mood changes.     CONSTITUTIONAL: NEGATIVE for fever, chills, change in weight  INTEGUMENTARU/SKIN: NEGATIVE for worrisome rashes, moles or lesions  EYES: NEGATIVE for vision changes or irritation  ENT: NEGATIVE for ear, mouth and throat problems  RESP: NEGATIVE for significant cough or SOB  BREAST: NEGATIVE for masses, tenderness or discharge  CV: NEGATIVE for chest pain, palpitations or peripheral edema  GI: NEGATIVE for nausea, abdominal pain, heartburn, or change in bowel habits  : NEGATIVE for unusual urinary or vaginal symptoms. Periods are regular.  MUSCULOSKELETAL: NEGATIVE for significant arthralgias or myalgia  NEURO: NEGATIVE for weakness, dizziness or paresthesias  PSYCHIATRIC: NEGATIVE for changes in mood or affect     OBJECTIVE:   There were no vitals taken for this visit.  Physical Exam  GENERAL: healthy, alert and no distress  EYES: Eyes grossly normal to inspection, PERRL and conjunctivae and sclerae normal  HENT: ear canals and TM's normal, nose and mouth without ulcers or lesions  NECK: no adenopathy, no asymmetry, masses, or scars and thyroid normal to palpation  RESP: lungs clear to auscultation - no rales, rhonchi or wheezes  BREAST: normal without masses,  "tenderness or nipple discharge and no palpable axillary masses or adenopathy  CV: regular rate and rhythm, normal S1 S2, no S3 or S4, no murmur, click or rub, no peripheral edema and peripheral pulses strong  ABDOMEN: soft, nontender, no hepatosplenomegaly, no masses and bowel sounds normal   (female): normal female external genitalia, normal urethral meatus, vaginal mucosa pink, moist, well rugated, and normal cervix/adnexa/uterus without masses or discharge  MS: no gross musculoskeletal defects noted, no edema  SKIN: no suspicious lesions or rashes  NEURO: Normal strength and tone, mentation intact and speech normal  PSYCH: mentation appears normal, affect normal/bright    Diagnostic Test Results:  Labs reviewed in Epic    ASSESSMENT/PLAN:   1. Routine history and physical examination of adult      2. TONO (generalized anxiety disorder)  As above, restart prozac as has worked well in the past  - FLUoxetine (PROZAC) 10 MG capsule; Take 1 capsule (10 mg) by mouth daily For 1-2 weeks, then increase to 2 tablets daily  Dispense: 60 capsule; Refill: 1    3. Major depressive disorder, recurrent episode, moderate (H)  Restart prozac as has worked well in the past  - FLUoxetine (PROZAC) 10 MG capsule; Take 1 capsule (10 mg) by mouth daily For 1-2 weeks, then increase to 2 tablets daily  Dispense: 60 capsule; Refill: 1    4. Screening for malignant neoplasm of cervix    - Pap imaged thin layer screen with HPV - recommended age 30 - 65 years (select HPV order below)  - HPV High Risk Types DNA Cervical    COUNSELING:  Reviewed preventive health counseling, as reflected in patient instructions       Regular exercise       Healthy diet/nutrition       Contraception       Family planning    Estimated body mass index is 24.56 kg/m  as calculated from the following:    Height as of 1/24/19: 1.549 m (5' 1\").    Weight as of 1/24/19: 59 kg (130 lb).         reports that she has never smoked. She has never used smokeless " tobacco.      Counseling Resources:  ATP IV Guidelines  Pooled Cohorts Equation Calculator  Breast Cancer Risk Calculator  FRAX Risk Assessment  ICSI Preventive Guidelines  Dietary Guidelines for Americans, 2010  [employer]'s MyPlate  ASA Prophylaxis  Lung CA Screening    Kati Briones MD  [facility]

## 2019-05-23 ENCOUNTER — OFFICE VISIT (OUTPATIENT)
Dept: FAMILY MEDICINE | Facility: CLINIC | Age: 39
End: 2019-05-23
Payer: COMMERCIAL

## 2019-05-23 VITALS
HEIGHT: 61 IN | HEART RATE: 52 BPM | TEMPERATURE: 98.9 F | DIASTOLIC BLOOD PRESSURE: 60 MMHG | SYSTOLIC BLOOD PRESSURE: 92 MMHG | OXYGEN SATURATION: 98 % | BODY MASS INDEX: 24.17 KG/M2 | WEIGHT: 128 LBS

## 2019-05-23 DIAGNOSIS — F33.1 MAJOR DEPRESSIVE DISORDER, RECURRENT EPISODE, MODERATE (H): ICD-10-CM

## 2019-05-23 DIAGNOSIS — Z12.4 SCREENING FOR MALIGNANT NEOPLASM OF CERVIX: ICD-10-CM

## 2019-05-23 DIAGNOSIS — F41.1 GAD (GENERALIZED ANXIETY DISORDER): ICD-10-CM

## 2019-05-23 DIAGNOSIS — Z00.00 ROUTINE HISTORY AND PHYSICAL EXAMINATION OF ADULT: Primary | ICD-10-CM

## 2019-05-23 PROCEDURE — 99213 OFFICE O/P EST LOW 20 MIN: CPT | Mod: 25 | Performed by: FAMILY MEDICINE

## 2019-05-23 PROCEDURE — 87624 HPV HI-RISK TYP POOLED RSLT: CPT | Performed by: FAMILY MEDICINE

## 2019-05-23 PROCEDURE — 99395 PREV VISIT EST AGE 18-39: CPT | Performed by: FAMILY MEDICINE

## 2019-05-23 PROCEDURE — G0476 HPV COMBO ASSAY CA SCREEN: HCPCS | Performed by: FAMILY MEDICINE

## 2019-05-23 PROCEDURE — G0145 SCR C/V CYTO,THINLAYER,RESCR: HCPCS | Performed by: FAMILY MEDICINE

## 2019-05-23 RX ORDER — FLUOXETINE 10 MG/1
10 CAPSULE ORAL DAILY
Qty: 60 CAPSULE | Refills: 1 | Status: SHIPPED | OUTPATIENT
Start: 2019-05-23 | End: 2019-12-26

## 2019-05-23 ASSESSMENT — ENCOUNTER SYMPTOMS: BREAST MASS: 0

## 2019-05-23 ASSESSMENT — PATIENT HEALTH QUESTIONNAIRE - PHQ9
SUM OF ALL RESPONSES TO PHQ QUESTIONS 1-9: 16
5. POOR APPETITE OR OVEREATING: MORE THAN HALF THE DAYS

## 2019-05-23 ASSESSMENT — MIFFLIN-ST. JEOR: SCORE: 1202.98

## 2019-05-23 ASSESSMENT — ANXIETY QUESTIONNAIRES
6. BECOMING EASILY ANNOYED OR IRRITABLE: NEARLY EVERY DAY
3. WORRYING TOO MUCH ABOUT DIFFERENT THINGS: NEARLY EVERY DAY
5. BEING SO RESTLESS THAT IT IS HARD TO SIT STILL: MORE THAN HALF THE DAYS
IF YOU CHECKED OFF ANY PROBLEMS ON THIS QUESTIONNAIRE, HOW DIFFICULT HAVE THESE PROBLEMS MADE IT FOR YOU TO DO YOUR WORK, TAKE CARE OF THINGS AT HOME, OR GET ALONG WITH OTHER PEOPLE: SOMEWHAT DIFFICULT
7. FEELING AFRAID AS IF SOMETHING AWFUL MIGHT HAPPEN: NEARLY EVERY DAY
GAD7 TOTAL SCORE: 19
2. NOT BEING ABLE TO STOP OR CONTROL WORRYING: NEARLY EVERY DAY
1. FEELING NERVOUS, ANXIOUS, OR ON EDGE: NEARLY EVERY DAY

## 2019-05-23 NOTE — LETTER
My Depression Action Plan  Name: Brianna Abdalla   Date of Birth 1980  Date: 5/23/2019    My doctor: Kati Alaniz   My clinic: [facility]  [id number] [place] [id number] [phone]          GREEN    ZONE   Good Control    What it looks like:     Things are going generally well. You have normal up s and down s. You may even feel depressed from time to time, but bad moods usually last less than a day.   What you need to do:  1. Continue to care for yourself (see self care plan)  2. Check your depression survival kit and update it as needed  3. Follow your physician s recommendations including any medication.  4. Do not stop taking medication unless you consult with your physician first.           YELLOW         ZONE Getting Worse    What it looks like:     Depression is starting to interfere with your life.     It may be hard to get out of bed; you may be starting to isolate yourself from others.    Symptoms of depression are starting to last most all day and this has happened for several days.     You may have suicidal thoughts but they are not constant.   What you need to do:     1. Call your care team, your response to treatment will improve if you keep your care team informed of your progress. Yellow periods are signs an adjustment may need to be made.     2. Continue your self-care, even if you have to fake it!    3. Talk to someone in your support network    4. Open up your depression survival kit           RED    ZONE Medical Alert - Get Help    What it looks like:     Depression is seriously interfering with your life.     You may experience these or other symptoms: You can t get out of bed most days, can t work or engage in other necessary activities, you have trouble taking care of basic hygiene, or basic responsibilities, thoughts of suicide or death that will not go away, self-injurious behavior.     What you need to do:  1. Call your care team and request  a same-day appointment. If they are not available (weekends or after hours) call your local crisis line, emergency room or 911.            Depression Self Care Plan / Survival Kit    Self-Care for Depression  Here s the deal. Your body and mind are really not as separate as most people think.  What you do and think affects how you feel and how you feel influences what you do and think. This means if you do things that people who feel good do, it will help you feel better.  Sometimes this is all it takes.  There is also a place for medication and therapy depending on how severe your depression is, so be sure to consult with your medical provider and/ or Behavioral Health Consultant if your symptoms are worsening or not improving.     In order to better manage my stress, I will:    Exercise  Get some form of exercise, every day. This will help reduce pain and release endorphins, the  feel good  chemicals in your brain. This is almost as good as taking antidepressants!  This is not the same as joining a gym and then never going! (they count on that by the way ) It can be as simple as just going for a walk or doing some gardening, anything that will get you moving.      Hygiene   Maintain good hygiene (Get out of bed in the morning, Make your bed, Brush your teeth, Take a shower, and Get dressed like you were going to work, even if you are unemployed).  If your clothes don't fit try to get ones that do.    Diet  I will strive to eat foods that are good for me, drink plenty of water, and avoid excessive sugar, caffeine, alcohol, and other mood-altering substances.  Some foods that are helpful in depression are: complex carbohydrates, B vitamins, flaxseed, fish or fish oil, fresh fruits and vegetables.    Psychotherapy  I agree to participate in Individual Therapy (if recommended).    Medication  If prescribed medications, I agree to take them.  Missing doses can result in serious side effects.  I understand that drinking  alcohol, or other illicit drug use, may cause potential side effects.  I will not stop my medication abruptly without first discussing it with my provider.    Staying Connected With Others  I will stay in touch with my friends, family members, and my primary care provider/team.    Use your imagination  Be creative.  We all have a creative side; it doesn t matter if it s oil painting, sand castles, or mud pies! This will also kick up the endorphins.    Witness Beauty  (AKA stop and smell the roses) Take a look outside, even in mid-winter. Notice colors, textures. Watch the squirrels and birds.     Service to others  Be of service to others.  There is always someone else in need.  By helping others we can  get out of ourselves  and remember the really important things.  This also provides opportunities for practicing all the other parts of the program.    Humor  Laugh and be silly!  Adjust your TV habits for less news and crime-drama and more comedy.    Control your stress  Try breathing deep, massage therapy, biofeedback, and meditation. Find time to relax each day.     My support system    Clinic Contact:  Phone number:    Contact 1:  Phone number:    Contact 2:  Phone number:    [place]/:  Phone number:    Therapist:  Phone number:    Local crisis center:    Phone number:    Other community support:  Phone number:

## 2019-05-24 ASSESSMENT — ANXIETY QUESTIONNAIRES: GAD7 TOTAL SCORE: 19

## 2019-05-28 LAB
COPATH REPORT: NORMAL
PAP: NORMAL

## 2019-05-30 LAB
FINAL DIAGNOSIS: NORMAL
HPV HR 12 DNA CVX QL NAA+PROBE: NEGATIVE
HPV16 DNA SPEC QL NAA+PROBE: NEGATIVE
HPV18 DNA SPEC QL NAA+PROBE: NEGATIVE
SPECIMEN DESCRIPTION: NORMAL
SPECIMEN SOURCE CVX/VAG CYTO: NORMAL

## 2019-10-24 ENCOUNTER — OFFICE VISIT (OUTPATIENT)
Dept: FAMILY MEDICINE | Facility: CLINIC | Age: 39
End: 2019-10-24
Payer: COMMERCIAL

## 2019-10-24 VITALS
SYSTOLIC BLOOD PRESSURE: 95 MMHG | TEMPERATURE: 98.7 F | OXYGEN SATURATION: 97 % | HEIGHT: 61 IN | DIASTOLIC BLOOD PRESSURE: 60 MMHG | BODY MASS INDEX: 24.55 KG/M2 | RESPIRATION RATE: 16 BRPM | HEART RATE: 72 BPM | WEIGHT: 130 LBS

## 2019-10-24 DIAGNOSIS — D69.6 THROMBOCYTOPENIA (H): ICD-10-CM

## 2019-10-24 DIAGNOSIS — M25.542 ARTHRALGIA OF BOTH HANDS: ICD-10-CM

## 2019-10-24 DIAGNOSIS — M79.10 MYALGIA: Primary | ICD-10-CM

## 2019-10-24 DIAGNOSIS — M25.541 ARTHRALGIA OF BOTH HANDS: ICD-10-CM

## 2019-10-24 LAB
ALBUMIN SERPL-MCNC: 3.8 G/DL (ref 3.4–5)
ALP SERPL-CCNC: 55 U/L (ref 40–150)
ALT SERPL W P-5'-P-CCNC: 17 U/L (ref 0–50)
ANION GAP SERPL CALCULATED.3IONS-SCNC: 3 MMOL/L (ref 3–14)
AST SERPL W P-5'-P-CCNC: 14 U/L (ref 0–45)
BILIRUB SERPL-MCNC: 0.6 MG/DL (ref 0.2–1.3)
BUN SERPL-MCNC: 10 MG/DL (ref 7–30)
CALCIUM SERPL-MCNC: 8.7 MG/DL (ref 8.5–10.1)
CHLORIDE SERPL-SCNC: 110 MMOL/L (ref 94–109)
CO2 SERPL-SCNC: 28 MMOL/L (ref 20–32)
CREAT SERPL-MCNC: 0.7 MG/DL (ref 0.52–1.04)
CRP SERPL-MCNC: <2.9 MG/L (ref 0–8)
ERYTHROCYTE [DISTWIDTH] IN BLOOD BY AUTOMATED COUNT: 11.8 % (ref 10–15)
ERYTHROCYTE [SEDIMENTATION RATE] IN BLOOD BY WESTERGREN METHOD: 7 MM/H (ref 0–20)
GFR SERPL CREATININE-BSD FRML MDRD: >90 ML/MIN/{1.73_M2}
GLUCOSE SERPL-MCNC: 83 MG/DL (ref 70–99)
HCT VFR BLD AUTO: 37.5 % (ref 35–47)
HGB BLD-MCNC: 12.6 G/DL (ref 11.7–15.7)
MCH RBC QN AUTO: 31.3 PG (ref 26.5–33)
MCHC RBC AUTO-ENTMCNC: 33.6 G/DL (ref 31.5–36.5)
MCV RBC AUTO: 93 FL (ref 78–100)
PLATELET # BLD AUTO: 178 10E9/L (ref 150–450)
POTASSIUM SERPL-SCNC: 3.9 MMOL/L (ref 3.4–5.3)
PROT SERPL-MCNC: 6.8 G/DL (ref 6.8–8.8)
RBC # BLD AUTO: 4.03 10E12/L (ref 3.8–5.2)
SODIUM SERPL-SCNC: 141 MMOL/L (ref 133–144)
TSH SERPL DL<=0.005 MIU/L-ACNC: 2.17 MU/L (ref 0.4–4)
WBC # BLD AUTO: 3.8 10E9/L (ref 4–11)

## 2019-10-24 PROCEDURE — 85652 RBC SED RATE AUTOMATED: CPT | Performed by: FAMILY MEDICINE

## 2019-10-24 PROCEDURE — 86038 ANTINUCLEAR ANTIBODIES: CPT | Performed by: FAMILY MEDICINE

## 2019-10-24 PROCEDURE — 86431 RHEUMATOID FACTOR QUANT: CPT | Performed by: FAMILY MEDICINE

## 2019-10-24 PROCEDURE — 85027 COMPLETE CBC AUTOMATED: CPT | Performed by: FAMILY MEDICINE

## 2019-10-24 PROCEDURE — 84443 ASSAY THYROID STIM HORMONE: CPT | Performed by: FAMILY MEDICINE

## 2019-10-24 PROCEDURE — 36415 COLL VENOUS BLD VENIPUNCTURE: CPT | Performed by: FAMILY MEDICINE

## 2019-10-24 PROCEDURE — 86618 LYME DISEASE ANTIBODY: CPT | Performed by: FAMILY MEDICINE

## 2019-10-24 PROCEDURE — 99213 OFFICE O/P EST LOW 20 MIN: CPT | Performed by: FAMILY MEDICINE

## 2019-10-24 PROCEDURE — 80053 COMPREHEN METABOLIC PANEL: CPT | Performed by: FAMILY MEDICINE

## 2019-10-24 PROCEDURE — 86140 C-REACTIVE PROTEIN: CPT | Performed by: FAMILY MEDICINE

## 2019-10-24 ASSESSMENT — MIFFLIN-ST. JEOR: SCORE: 1207.06

## 2019-10-24 NOTE — PROGRESS NOTES
"Subjective     Brianna Abdalla is a 39 year old female who presents to clinic today for the following health issues:    HPI   Body aches      Duration: leg pain     Description  Location: cramping, shooting , aching pain, stiffness, tired    Intensity:  severe    Accompanying signs and symptoms: see above     History  Previous similar problem: YES- more often in the fall  Previous evaluation:  Last year    Precipitating or alleviating factors:  Trauma or overuse: no       Therapies tried and outcome: stretching and creams on legs, ibuprofen     Seen last fall for shoulder and neck pain. Was given an nsaid and told to stretch and do yoga  That has resolved    Now for 3 weeks, pt notes increased fatigue, right leg started with mago horse.   Still going to gym 2x per week and yoga once per week.   Now feels more like pressure on right lower leg, and waking up with shooting pain in leg from ankle to top of knee  Also notes stiffness in fingers and neck and sometimes in hip  And sometimes shooting pain in elbow to wrists.    Continues at gym which does not help or hurt her symptoms  She would like to make sure she does not have any autoimmune issues going on        Reviewed and updated as needed this visit by Provider         Review of Systems   ROS COMP: Constitutional, HEENT, cardiovascular, pulmonary, gi and gu systems are negative, except as otherwise noted.      Objective    BP 95/60   Pulse 72   Temp 98.7  F (37.1  C) (Oral)   Resp 16   Ht 1.549 m (5' 1\")   Wt 59 kg (130 lb)   LMP 09/18/2019   SpO2 97%   BMI 24.56 kg/m    Body mass index is 24.56 kg/m .  Physical Exam   GENERAL: healthy, alert and no distress  RESP: lungs clear to auscultation - no rales, rhonchi or wheezes  CV: regular rate and rhythm, normal S1 S2, no S3 or S4, no murmur, click or rub, no peripheral edema and peripheral pulses strong  MS: no gross musculoskeletal defects noted, no edema, no pain to palpation over right lower leg, " negative homans  SKIN: no suspicious lesions or rashes    Diagnostic Test Results:  Labs reviewed in Epic        Assessment & Plan     1. Myalgia  Await labs  - Erythrocyte sedimentation rate auto  - CRP inflammation  - Rheumatoid factor  - Comprehensive metabolic panel  - CBC with platelets  - TSH with free T4 reflex  - Anti Nuclear Marielle IgG by IFA with Reflex  - Lyme Disease Marielle with reflex to WB Serum    2. Arthralgia of both hands  same  - Erythrocyte sedimentation rate auto  - CRP inflammation  - Rheumatoid factor  - Comprehensive metabolic panel  - CBC with platelets  - TSH with free T4 reflex  - Anti Nuclear Marielle IgG by IFA with Reflex  - Lyme Disease Marielle with reflex to WB Serum      4. Thrombocytopenia (H)  recheck             No follow-ups on file.    Kati Briones MD  [facility]

## 2019-10-25 LAB
ANA SER QL IF: NEGATIVE
B BURGDOR IGG+IGM SER QL: 0.07 (ref 0–0.89)
RHEUMATOID FACT SER NEPH-ACNC: <20 IU/ML (ref 0–20)

## 2019-12-15 ENCOUNTER — HEALTH MAINTENANCE LETTER (OUTPATIENT)
Age: 39
End: 2019-12-15

## 2019-12-26 ENCOUNTER — TELEPHONE (OUTPATIENT)
Dept: FAMILY MEDICINE | Facility: CLINIC | Age: 39
End: 2019-12-26

## 2019-12-26 DIAGNOSIS — F33.1 MAJOR DEPRESSIVE DISORDER, RECURRENT EPISODE, MODERATE (H): ICD-10-CM

## 2019-12-26 DIAGNOSIS — F41.1 GAD (GENERALIZED ANXIETY DISORDER): ICD-10-CM

## 2019-12-26 RX ORDER — FLUOXETINE 10 MG/1
20 CAPSULE ORAL DAILY
Qty: 180 CAPSULE | Refills: 0 | Status: SHIPPED | OUTPATIENT
Start: 2019-12-26 | End: 2021-11-16

## 2019-12-26 NOTE — TELEPHONE ENCOUNTER
Reason for Call:  Medication or medication refill:    Do you use a [facility]?  Name of the pharmacy and phone number for the current request:  [facility]     Name of the medication requested: fluoxetine     Other request: requesting a refill for this medication     Can we leave a detailed message on this number? YES    Phone number patient can be reached at: Home number on file [phone] (home)    Best Time:     Call taken on 12/26/2019 at 9:49 AM by Araceli Proctor

## 2020-01-03 NOTE — TELEPHONE ENCOUNTER
Patient will be out of medication today. Patient called on 12/26/2019     Please call patient at [phone].

## 2020-01-03 NOTE — TELEPHONE ENCOUNTER
RN returned call to pt and notified her that an order for fluoxetine was sent to [facility] on 12/26/19.   Pt states she has not heard from the pharmacy, but also did not call to check to see if an order was ready.  RN recommended pt contact pharmacy to check on status of 12/26/19 order, and call RN back directly at [phone] if the order needs to be resent or other problem.  Pt indicates understanding of issues and agrees with the plan.    FLUoxetine (PROZAC) 10 MG capsule 180 capsule 0 12/26/2019  No   Sig - Route: Take 2 capsules (20 mg) by mouth daily - Oral   Sent to pharmacy as: FLUoxetine (PROZAC) 10 MG capsule   Class: E-Prescribe   Order: [id number]   E-Prescribing Status: Receipt confirmed by pharmacy (12/26/2019 10:02 AM CST)   [id number] - [facility] [address]  UZMA GarcseN, RN

## 2020-01-17 ENCOUNTER — TELEPHONE (OUTPATIENT)
Dept: FAMILY MEDICINE | Facility: CLINIC | Age: 40
End: 2020-01-17

## 2020-01-17 NOTE — TELEPHONE ENCOUNTER
My chart message along with PHQ9 and TONO sent to patient to complete and return.    PHQ-9 due now for patient   Index date:       5/23/209  Index PHQ9 :   16  FU start date:   9/24/2019  FU End date :   1/22/2020    Marisol Chapman, [facility]

## 2020-05-13 ENCOUNTER — VIRTUAL VISIT (OUTPATIENT)
Dept: FAMILY MEDICINE | Facility: CLINIC | Age: 40
End: 2020-05-13
Payer: COMMERCIAL

## 2020-05-13 DIAGNOSIS — L98.9 SKIN SORE: Primary | ICD-10-CM

## 2020-05-13 PROCEDURE — 99213 OFFICE O/P EST LOW 20 MIN: CPT | Mod: 95 | Performed by: PHYSICIAN ASSISTANT

## 2020-05-13 ASSESSMENT — PATIENT HEALTH QUESTIONNAIRE - PHQ9: SUM OF ALL RESPONSES TO PHQ QUESTIONS 1-9: 14

## 2020-05-13 NOTE — PROGRESS NOTES
"Brianna Abdalla is a 39 year old female who is being evaluated via a billable video visit.      The patient has been notified of following:     \"This video visit will be conducted via a call between you and your physician/provider. We have found that certain health care needs can be provided without the need for an in-person physical exam.  This service lets us provide the care you need with a video conversation.  If a prescription is necessary we can send it directly to your pharmacy.  If lab work is needed we can place an order for that and you can then stop by our lab to have the test done at a later time.    Video visits are billed at different rates depending on your insurance coverage.  Please reach out to your insurance provider with any questions.    If during the course of the call the physician/provider feels a video visit is not appropriate, you will not be charged for this service.\"    Patient has given verbal consent for Video visit? Yes    How would you like to obtain your AVS? Maria R    Patient would like the video invitation sent by: Text to cell phone: [phone]    Will anyone else be joining your video visit? No      Subjective     Brianna Abdalla is a 39 year old female who presents today via video visit for the following health issues:    HPI  Possible insect bite      Duration: noticed this morning    Description (location/character/radiation): Left shoulder - reddish/purple in color, shoulder feels sore, warm to touch     Intensity:  moderate    Accompanying signs and symptoms:     History (similar episodes/previous evaluation): None    Precipitating or alleviating factors: None    Therapies tried and outcome: hydrocortisone   She is unsure if this is from a bite.  She did not notice anything when she went to bed and then woke up and noticed some soreness.  She denies any fevers chills or other body aches otherwise feels fine.      Video Start Time: 3:17      Patient Active Problem List " "  Diagnosis     CARDIOVASCULAR SCREENING; LDL GOAL LESS THAN 160     Abnormal Pap smear of cervix     Thrombocytopenia (H)     BCC (basal cell carcinoma), face     Major depressive disorder, recurrent episode, moderate (H)     TONO (generalized anxiety disorder)     Past Surgical History:   Procedure Laterality Date     LEEP TX, CERVICAL      ARNOLDO 2/3       Social History     Tobacco Use     Smoking status: Never Smoker     Smokeless tobacco: Never Used   Substance Use Topics     Alcohol use: No     Family History   Problem Relation Age of Onset     Diabetes Maternal Grandmother      Diabetes Brother          Current Outpatient Medications   Medication Sig Dispense Refill     FLUoxetine (PROZAC) 10 MG capsule Take 2 capsules (20 mg) by mouth daily 180 capsule 0     Allergies   Allergen Reactions     Amoxicillin      Iodine I 131 Tositumomab        Reviewed and updated as needed this visit by Provider  Tobacco  Allergies  Meds  Problems  Med Hx  Surg Hx  Fam Hx         Review of Systems   Constitutional, HEENT, cardiovascular, pulmonary, gi and gu systems are negative, except as otherwise noted.      Objective    There were no vitals taken for this visit.  Estimated body mass index is 24.56 kg/m  as calculated from the following:    Height as of 10/24/19: 1.549 m (5' 1\").    Weight as of 10/24/19: 59 kg (130 lb).  Physical Exam     GENERAL: Healthy, alert and no distress  EYES: Eyes grossly normal to inspection.  No discharge or erythema, or obvious scleral/conjunctival abnormalities.  RESP: No audible wheeze, cough, or visible cyanosis.  No visible retractions or increased work of breathing.    SKIN: Visible skin clear. No significant rash, abnormal pigmentation or lesions.  NEURO: Cranial nerves grossly intact.  Mentation and speech appropriate for age.  PSYCH: Mentation appears normal, affect normal/bright, judgement and insight intact, normal speech and appearance well-groomed.  She has about a 3 mm " erythematous lesion on her left anterior shoulder.  She has some slight swelling.  She has full range of motion of her shoulder.  There does not appear to be any discharge.      Diagnostic Test Results:  Labs reviewed in Epic        Assessment & Plan       ICD-10-CM    1. Skin sore  L98.9         Talk to patient about her concerns.  It appears that this is some kind of bite.  We discussed tick bites which if there was a tick is simply not on her anymore and was on her less than 24 hours.  We talked about signs of infection.  She will contact us if this worsens.  At this time I do not feel like is infected.      Return in about 1 week (around 5/20/2020) for As Needed.    Tenzin Mccloud PA-C  [facility]      Video-Visit Details    Type of service:  Video Visit    Video End Time:3:24 PM    Originating Location (pt. Location): Home    Distant Location (provider location):  [facility]     Platform used for Video Visit: Doximity    Return in about 1 week (around 5/20/2020) for As Needed.       Tenzin Mccloud PA-C

## 2020-07-13 DIAGNOSIS — F41.1 GAD (GENERALIZED ANXIETY DISORDER): ICD-10-CM

## 2020-07-13 DIAGNOSIS — F33.1 MAJOR DEPRESSIVE DISORDER, RECURRENT EPISODE, MODERATE (H): ICD-10-CM

## 2020-07-14 NOTE — TELEPHONE ENCOUNTER
Patient overdue for a physical and PHQ-9.  No appointment pending at this time.  Routing to provider to advise.    Sherice GUSMANN, RN

## 2020-07-14 NOTE — TELEPHONE ENCOUNTER
I spoke to the patient and she wants to do a telephone visit.  I scheduled an appointment for her on 7/21/20.  An Wren,

## 2020-07-20 NOTE — PROGRESS NOTES
"Brianna Abdalla is a 39 year old female who is being evaluated via a billable telephone visit.      The patient has been notified of following:     \"This telephone visit will be conducted via a call between you and your physician/provider. We have found that certain health care needs can be provided without the need for a physical exam.  This service lets us provide the care you need with a short phone conversation.  If a prescription is necessary we can send it directly to your pharmacy.  If lab work is needed we can place an order for that and you can then stop by our lab to have the test done at a later time.    Telephone visits are billed at different rates depending on your insurance coverage. During this emergency period, for some insurers they may be billed the same as an in-person visit.  Please reach out to your insurance provider with any questions.    If during the course of the call the physician/provider feels a telephone visit is not appropriate, you will not be charged for this service.\"    Patient has given verbal consent for Telephone visit?  Yes    What phone number would you like to be contacted at? [phone]    How would you like to obtain your AVS? MyChart    Subjective     Brianna Abdalla is a 39 year old female who presents via phone visit today for the following health issues:    HPI    Depression and Anxiety Follow-Up    How are you doing with your depression since your last visit? No change/sometimes panic attacks    How are you doing with your anxiety since your last visit?  During lock down/ business closed she has panic attacks     Are you having other symptoms that might be associated with depression or anxiety? Yes:  panic attacks/ not sleeping / its better on med and exercise    Have you had a significant life event? OTHER: business closed     Do you have any concerns with your use of alcohol or other drugs? No    Social History     Tobacco Use     Smoking status: Never Smoker     " Smokeless tobacco: Never Used   Substance Use Topics     Alcohol use: No     Drug use: No     PHQ 6/29/2017 5/23/2019 5/13/2020   PHQ-9 Total Score 6 16 14   Q9: Thoughts of better off dead/self-harm past 2 weeks Not at all Not at all Not at all     TONO-7 SCORE 2/2/2017 6/29/2017 5/23/2019   Total Score 13 7 19     Last PHQ-9 7/21/2020   1.  Little interest or pleasure in doing things 0   2.  Feeling down, depressed, or hopeless 0   3.  Trouble falling or staying asleep, or sleeping too much 1   4.  Feeling tired or having little energy 1   5.  Poor appetite or overeating 0   6.  Feeling bad about yourself 0   7.  Trouble concentrating 1   8.  Moving slowly or restless 0   Q9: Thoughts of better off dead/self-harm past 2 weeks 0   PHQ-9 Total Score 3   Difficulty at work, home, or with people Somewhat difficult     TONO-7  7/21/2020   1. Feeling nervous, anxious, or on edge 1   2. Not being able to stop or control worrying 1   3. Worrying too much about different things 1   4. Trouble relaxing -   5. Being so restless that it is hard to sit still -   6. Becoming easily annoyed or irritable -   7. Feeling afraid, as if something awful might happen -   TONO-7 Total Score -   If you checked any problems, how difficult have they made it for you to do your work, take care of things at home, or get along with other people? -       Suicide Assessment Five-step Evaluation and Treatment (SAFE-T)        Pt feels medication works mostly but for big situations can't shut her mind down and sometimes can't sleep  She is back to work and is more physically active so feels well   Feels this dose is working for her. Discussed could go up to 30 if she feels she needs to  Has been on 40 mg in the past and thought it was too much  No other concerns.  Her HM is UTD  Will refill            Reviewed and updated as needed this visit by Provider         Review of Systems   Constitutional, HEENT, cardiovascular, pulmonary, gi and gu systems  are negative, except as otherwise noted.       Objective   Reported vitals:  There were no vitals taken for this visit.   healthy, alert and no distress  PSYCH: Alert and oriented times 3; coherent speech, normal   rate and volume, able to articulate logical thoughts, able   to abstract reason, no tangential thoughts, no hallucinations   or delusions  Her affect is normal  RESP: No cough, no audible wheezing, able to talk in full sentences  Remainder of exam unable to be completed due to telephone visits    Diagnostic Test Results:  Labs reviewed in Epic        Assessment/Plan:    1. TONO (generalized anxiety disorder)  Doing well per pt and TONO  - FLUoxetine (PROZAC) 20 MG capsule; Take 1 capsule (20 mg) by mouth daily  Dispense: 90 capsule; Refill: 3    2. Major depressive disorder, recurrent episode, moderate (H)  Doing well per pt and PHQ-9  - FLUoxetine (PROZAC) 20 MG capsule; Take 1 capsule (20 mg) by mouth daily  Dispense: 90 capsule; Refill: 3    No follow-ups on file.      Phone call duration:  7 minutes    Kati Dominguez MD

## 2020-07-21 ENCOUNTER — VIRTUAL VISIT (OUTPATIENT)
Dept: FAMILY MEDICINE | Facility: CLINIC | Age: 40
End: 2020-07-21
Payer: COMMERCIAL

## 2020-07-21 ENCOUNTER — TELEPHONE (OUTPATIENT)
Dept: BEHAVIORAL HEALTH | Facility: CLINIC | Age: 40
End: 2020-07-21

## 2020-07-21 DIAGNOSIS — F41.1 GAD (GENERALIZED ANXIETY DISORDER): ICD-10-CM

## 2020-07-21 DIAGNOSIS — F33.1 MAJOR DEPRESSIVE DISORDER, RECURRENT EPISODE, MODERATE (H): ICD-10-CM

## 2020-07-21 PROCEDURE — 96127 BRIEF EMOTIONAL/BEHAV ASSMT: CPT | Performed by: FAMILY MEDICINE

## 2020-07-21 PROCEDURE — 99214 OFFICE O/P EST MOD 30 MIN: CPT | Mod: 95 | Performed by: FAMILY MEDICINE

## 2020-07-21 RX ORDER — FLUOXETINE 10 MG/1
20 CAPSULE ORAL DAILY
Qty: 180 CAPSULE | Refills: 3 | Status: CANCELLED | OUTPATIENT
Start: 2020-07-21

## 2020-07-21 ASSESSMENT — ANXIETY QUESTIONNAIRES
IF YOU CHECKED OFF ANY PROBLEMS ON THIS QUESTIONNAIRE, HOW DIFFICULT HAVE THESE PROBLEMS MADE IT FOR YOU TO DO YOUR WORK, TAKE CARE OF THINGS AT HOME, OR GET ALONG WITH OTHER PEOPLE: NOT DIFFICULT AT ALL
1. FEELING NERVOUS, ANXIOUS, OR ON EDGE: SEVERAL DAYS
6. BECOMING EASILY ANNOYED OR IRRITABLE: SEVERAL DAYS
GAD7 TOTAL SCORE: 7
3. WORRYING TOO MUCH ABOUT DIFFERENT THINGS: SEVERAL DAYS
2. NOT BEING ABLE TO STOP OR CONTROL WORRYING: SEVERAL DAYS
5. BEING SO RESTLESS THAT IT IS HARD TO SIT STILL: NOT AT ALL
7. FEELING AFRAID AS IF SOMETHING AWFUL MIGHT HAPPEN: SEVERAL DAYS

## 2020-07-21 ASSESSMENT — PATIENT HEALTH QUESTIONNAIRE - PHQ9
5. POOR APPETITE OR OVEREATING: MORE THAN HALF THE DAYS
SUM OF ALL RESPONSES TO PHQ QUESTIONS 1-9: 3

## 2020-07-21 NOTE — TELEPHONE ENCOUNTER
[facility]  No data recorded      Social Work Care Navigator Note      Patient: Brianna Abdalla  Date: July 21, 2020  Preferred Name: Brianna    Previous PHQ-9:   PHQ-9 SCORE 5/23/2019 5/13/2020 7/21/2020   PHQ-9 Total Score 16 14 3     Previous TONO-7:   TONO-7 SCORE 6/29/2017 5/23/2019 7/21/2020   Total Score 7 19 7     CORA LEVEL:  CORA Score (Last Two) 5/11/2011 1/17/2017   CORA Raw Score 39 37   Activation Score 56.4 79.2   CORA Level 3 4       Preferred Contact:  No data recorded    Type of Contact Today: Phone call (not reached/unavailable)      Data: (subjective / Objective):  Attempted to reach patient for [facility] Offer. Left message requesting call back. [facility] also mailed brochure and offer letter to patient.     Marli Whitman HELLEN  [facility]   [facility]  [phone]

## 2020-07-22 ASSESSMENT — ANXIETY QUESTIONNAIRES: GAD7 TOTAL SCORE: 7

## 2020-10-04 ENCOUNTER — NURSE TRIAGE (OUTPATIENT)
Dept: NURSING | Facility: CLINIC | Age: 40
End: 2020-10-04

## 2020-10-04 NOTE — TELEPHONE ENCOUNTER
Sore throat    Headache    Exposed to Covid in the past 14 days    Patient's symptoms started 2 days ago    No fever    Breathing fine    No chest pain    No cough    No chills    No body aches    No chest pain    COVID 19 Nurse Triage Plan/Patient Instructions    Please be aware that novel coronavirus (COVID-19) may be circulating in the community. If you develop symptoms such as fever, cough, or SOB or if you have concerns about the presence of another infection including coronavirus (COVID-19), please contact your health care provider or visit [email or web address].     Disposition/Instructions    Virtual Visit with provider recommended. Reference Visit Selection Guide.    Thank you for taking steps to prevent the spread of this virus.  o Limit your contact with others.  o Wear a simple mask to cover your cough.  o Wash your hands well and often.    Resources    [facility]: About COVID-19: [email or web address]."[email or web address]/    CDC: What to Do If You're Sick: www.cdc.gov/coronavirus/2019-ncov/about/steps-when-sick.html    CDC: Ending Home Isolation: www.cdc.gov/coronavirus/2019-ncov/hcp/disposition-in-home-patients.html     CDC: Caring for Someone: www.cdc.gov/coronavirus/2019-ncov/if-you-are-sick/care-for-someone.html     [facility]: Interim Guidance for Hospital Discharge to Home: www.health.[facility].mn.us/diseases/coronavirus/hcp/hospdischarge.pdf    [facility] clinical trials (COVID-19 research studies): clinicalaffairs.[facility]-clinical-trials     Below are the COVID-19 hotlines at the [facility] ([facility]). Interpreters are available.   o For health questions: Call 934-348-6921 or 1-947.552.1743 (7 a.m. to 7 p.m.)  o For questions about schools and childcare: Call 699-042-9110 or 1-329.748.6093 (7 a.m. to 7 p.m.)         Aliza Jarerll RN  [facility] Nurse Advisor  8:57 AM  10/4/2020                Reason for Disposition    [1] COVID-19 infection suspected by caller or triager AND [2]  mild symptoms (cough, fever, or others) AND [3] no complications or SOB    Additional Information    Negative: SEVERE difficulty breathing (e.g., struggling for each breath, speaks in single words)    Negative: Difficult to awaken or acting confused (e.g., disoriented, slurred speech)    Negative: Bluish (or gray) lips or face now    Negative: Shock suspected (e.g., cold/pale/clammy skin, too weak to stand, low BP, rapid pulse)    Negative: Sounds like a life-threatening emergency to the triager    Negative: SEVERE or constant chest pain or pressure (Exception: mild central chest pain, present only when coughing)    Negative: MODERATE difficulty breathing (e.g., speaks in phrases, SOB even at rest, pulse 100-120)    Negative: Patient sounds very sick or weak to the triager    Negative: MILD difficulty breathing (e.g., minimal/no SOB at rest, SOB with walking, pulse <100)    Negative: Chest pain or pressure    Negative: Fever > 103 F (39.4 C)    Negative: [1] Fever > 101 F (38.3 C) AND [2] age > 60    Negative: [1] Fever > 100.0 F (37.8 C) AND [2] bedridden (e.g., nursing home patient, CVA, chronic illness, recovering from surgery)    Negative: HIGH RISK patient (e.g., age > 64 years, diabetes, heart or lung disease, weak immune system) (Exception: Has already been evaluated by healthcare provider and has no new or worsening symptoms)    Negative: Fever present > 3 days (72 hours)    Negative: [1] Fever returns after gone for over 24 hours AND [2] symptoms worse or not improved    Negative: [1] Continuous (nonstop) coughing interferes with work or school AND [2] no improvement using cough treatment per protocol    Protocols used: CORONAVIRUS (COVID-19) DIAGNOSED OR [id number] 8.4.20

## 2021-01-15 ENCOUNTER — HEALTH MAINTENANCE LETTER (OUTPATIENT)
Age: 41
End: 2021-01-15

## 2021-09-04 ENCOUNTER — HEALTH MAINTENANCE LETTER (OUTPATIENT)
Age: 41
End: 2021-09-04

## 2021-09-18 DIAGNOSIS — F33.1 MAJOR DEPRESSIVE DISORDER, RECURRENT EPISODE, MODERATE (H): ICD-10-CM

## 2021-09-18 DIAGNOSIS — F41.1 GAD (GENERALIZED ANXIETY DISORDER): ICD-10-CM

## 2021-09-20 NOTE — TELEPHONE ENCOUNTER
Routing refill request to provider for review/approval because:  Patient needs to be seen because it has been more than 1 year since last office visit.  Patient needs updated PHQ-9.     PHQ-9 and GAD7 Scores  1/3/2017   1/17/2017   2/2/2017   6/29/2017   5/23/2019   5/13/2020   7/21/2020    PHQ-9 Total Score 15 9 8 6 16 14 3   TONO-7 Total Score 19 13 13 7 19 - 7    1/3/2017 1/17/2017 2/2/2017 6/29/2017 5/23/2019 5/13/2020 7/21/2020     Paris Westfall RN  [facility]

## 2021-10-25 DIAGNOSIS — F33.1 MAJOR DEPRESSIVE DISORDER, RECURRENT EPISODE, MODERATE (H): ICD-10-CM

## 2021-10-25 DIAGNOSIS — F41.1 GAD (GENERALIZED ANXIETY DISORDER): ICD-10-CM

## 2021-10-26 NOTE — TELEPHONE ENCOUNTER
"Routing refill request to provider for review/approval because:  Requested Prescriptions   Pending Prescriptions Disp Refills    FLUoxetine (PROZAC) 20 MG capsule [Pharmacy Med Name: FLUOXETINE 20MG CAPSULES] 30 capsule 0     Sig: TAKE 1 CAPSULE(20 MG) BY MOUTH DAILY        SSRIs Protocol Failed - 10/25/2021  3:14 AM        Failed - PHQ-9 score less than 5 in past 6 months     Please review last PHQ-9 score.           Failed - Recent (6 mo) or future (30 days) visit within the authorizing provider's specialty     Patient had office visit in the last 6 months or has a visit in the next 30 days with authorizing provider or within the authorizing provider's specialty.  See \"Patient Info\" tab in inbasket, or \"Choose Columns\" in Meds & Orders section of the refill encounter.            Passed - Medication is active on med list        Passed - Patient is age 18 or older        Passed - No active pregnancy on record        Passed - No positive pregnancy test in last 12 months            PHQ 5/23/2019 5/13/2020 7/21/2020   PHQ-9 Total Score 16 14 3   Q9: Thoughts of better off dead/self-harm past 2 weeks Not at all Not at all Not at all         Jo-Ann Johnson BSN, RN        "

## 2021-11-16 ENCOUNTER — OFFICE VISIT (OUTPATIENT)
Dept: FAMILY MEDICINE | Facility: CLINIC | Age: 41
End: 2021-11-16
Payer: COMMERCIAL

## 2021-11-16 VITALS
TEMPERATURE: 97.5 F | SYSTOLIC BLOOD PRESSURE: 112 MMHG | DIASTOLIC BLOOD PRESSURE: 78 MMHG | BODY MASS INDEX: 23.55 KG/M2 | WEIGHT: 128 LBS | OXYGEN SATURATION: 98 % | HEIGHT: 62 IN | RESPIRATION RATE: 14 BRPM | HEART RATE: 72 BPM

## 2021-11-16 DIAGNOSIS — F41.1 GAD (GENERALIZED ANXIETY DISORDER): Primary | ICD-10-CM

## 2021-11-16 DIAGNOSIS — F33.1 MAJOR DEPRESSIVE DISORDER, RECURRENT EPISODE, MODERATE (H): ICD-10-CM

## 2021-11-16 DIAGNOSIS — N92.6 IRREGULAR PERIODS: ICD-10-CM

## 2021-11-16 PROCEDURE — 99214 OFFICE O/P EST MOD 30 MIN: CPT | Performed by: PHYSICIAN ASSISTANT

## 2021-11-16 RX ORDER — DROSPIRENONE AND ETHINYL ESTRADIOL 0.02-3(28)
1 KIT ORAL DAILY
Qty: 84 TABLET | Refills: 1 | Status: SHIPPED | OUTPATIENT
Start: 2021-11-16 | End: 2023-08-15

## 2021-11-16 RX ORDER — FLUOXETINE 10 MG/1
10 CAPSULE ORAL DAILY
Qty: 90 CAPSULE | Refills: 1 | Status: SHIPPED | OUTPATIENT
Start: 2021-11-16 | End: 2022-01-13

## 2021-11-16 ASSESSMENT — ANXIETY QUESTIONNAIRES
GAD7 TOTAL SCORE: 14
IF YOU CHECKED OFF ANY PROBLEMS ON THIS QUESTIONNAIRE, HOW DIFFICULT HAVE THESE PROBLEMS MADE IT FOR YOU TO DO YOUR WORK, TAKE CARE OF THINGS AT HOME, OR GET ALONG WITH OTHER PEOPLE: SOMEWHAT DIFFICULT
5. BEING SO RESTLESS THAT IT IS HARD TO SIT STILL: MORE THAN HALF THE DAYS
6. BECOMING EASILY ANNOYED OR IRRITABLE: MORE THAN HALF THE DAYS
1. FEELING NERVOUS, ANXIOUS, OR ON EDGE: MORE THAN HALF THE DAYS
7. FEELING AFRAID AS IF SOMETHING AWFUL MIGHT HAPPEN: MORE THAN HALF THE DAYS
2. NOT BEING ABLE TO STOP OR CONTROL WORRYING: MORE THAN HALF THE DAYS
3. WORRYING TOO MUCH ABOUT DIFFERENT THINGS: MORE THAN HALF THE DAYS

## 2021-11-16 ASSESSMENT — PATIENT HEALTH QUESTIONNAIRE - PHQ9: 5. POOR APPETITE OR OVEREATING: MORE THAN HALF THE DAYS

## 2021-11-16 ASSESSMENT — MIFFLIN-ST. JEOR: SCORE: 1203.85

## 2021-11-16 NOTE — PROGRESS NOTES
Assessment & Plan     TONO (generalized anxiety disorder)  Not to goal  Consider changing to effexor or lexapro in future    - FLUoxetine (PROZAC) 20 MG capsule; Take with 10 mg for total 30 mg prozac  - FLUoxetine (PROZAC) 10 MG capsule; Take 1 capsule (10 mg) by mouth daily    Major depressive disorder, recurrent episode, moderate (H)  Not to goal  - FLUoxetine (PROZAC) 20 MG capsule; Take with 10 mg for total 30 mg prozac  - FLUoxetine (PROZAC) 10 MG capsule; Take 1 capsule (10 mg) by mouth daily    Irregular periods  See below  - drospirenone-ethinyl estradiol (CAMI) 3-0.02 MG tablet; Take 1 tablet by mouth daily    Nonsmoker.    Return in about 4 weeks (around 12/14/2021) for med recheck.    Patient Instructions   Increase prozac to 30 mg  Then 2 weeks later you can add cami birth control if needed    Follow up 4-8 weeks, sooner if needed    Schedule physical      1)     Re-check with me in 4 weeks.  If doing really well, this can be via phone or [place].  Otherwise re-check in clinic.     Call with side effects or concerns.     Medication should start to work within 1-2 weeks but will not have full effect for about 6 weeks.     If medication makes you feel worse, stop right away and recheck with me.     If suicidal thoughts or plan occur, call 911 or go to emergency room.         2)  As your body allows, Try to start exercising see this article from [facility]:  Depression and anxiety: Exercise eases symptoms  Depression and anxiety symptoms often improve with exercise. Here are some realistic tips to help you get started and stay motivated.  By [facility] Staff   When you have depression or anxiety, exercise often seems like the last thing you want to do. But once you get motivated, exercise can make a big difference.  Exercise helps prevent and improve a number of health problems, including high blood pressure, diabetes and arthritis. Research on depression, anxiety and exercise shows that the psychological  and physical benefits of exercise can also help improve mood and reduce anxiety.  The links between depression, anxiety and exercise aren't entirely clear -- but working out and other forms of physical activity can definitely ease symptoms of depression or anxiety and make you feel better. Exercise may also help keep depression and anxiety from coming back once you're feeling better.  How does exercise help depression and anxiety?  Regular exercise may help ease depression and anxiety by:  Releasing feel-good endorphins, natural cannabis-like brain chemicals (endogenous cannabinoids) and other natural brain chemicals that can enhance your sense of well-being   Taking your mind off worries so you can get away from the cycle of negative thoughts that feed depression and anxiety  Regular exercise has many psychological and emotional benefits, too. It can help you:  Gain confidence. Meeting exercise goals or challenges, even small ones, can boost your self-confidence. Getting in shape can also make you feel better about your appearance.   Get more social interaction. Exercise and physical activity may give you the chance to meet or socialize with others. Just exchanging a friendly smile or greeting as you walk around your neighborhood can help your mood.   [place] in a healthy way. Doing something positive to manage depression or anxiety is a healthy coping strategy. Trying to feel better by drinking alcohol, dwelling on how you feel, or hoping depression or anxiety will go away on its own can lead to worsening symptoms.  Is a structured exercise program the only option?  Some research shows that physical activity such as regular walking -- not just formal exercise programs -- may help improve mood. Physical activity and exercise are not the same thing, but both are beneficial to your health.  Physical activity is any activity that works your muscles and requires energy and can include work or household or leisure  "activities.   Exercise is a planned, structured and repetitive body movement done to improve or maintain physical fitness.  The word \"exercise\" may make you think of running laps around the gym. But exercise includes a wide range of activities that boost your activity level to help you feel better.  Certainly running, lifting weights, playing basketball and other fitness activities that get your heart pumping can help. But so can physical activity such as gardening, washing your car, walking around the block or engaging in other less intense activities. Any physical activity that gets you off the couch and moving can help improve your mood.  You don't have to do all your exercise or other physical activity at once. Broaden how you think of exercise and find ways to add small amounts of physical activity throughout your day. For example, take the stairs instead of the elevator. Park a little farther away from work to fit in a short walk. Or, if you live close to your job, consider biking to work.              Declined sleep medication for now      Kelin Cisneros PA-C  [facility] LEONARD Reed is a 41 year old who presents for the following health issues  accompanied by her Self.    HPI     Depression and Anxiety Follow-Up    How are you doing with your depression since your last visit? No change    How are you doing with your anxiety since your last visit?  No change    Are you having other symptoms that might be associated with depression or anxiety? Yes:  on and off dizziness    Have you had a significant life event? No     Do you have any concerns with your use of alcohol or other drugs? No    Has been trying to exercise more.   Not sleeping as well, getting up more often.   Periods-irregular.  Has not been on birth control for a long time.   Was on xiomara previously. No problems on that.     Has been taking 20 mg daily.   40 mg was too much for her previously, too numb. " "      Wondering about oral contraceptive pill for mood changes and periods.        Social History     Tobacco Use     Smoking status: Never Smoker     Smokeless tobacco: Never Used   Substance Use Topics     Alcohol use: No     Drug use: No     PHQ 5/23/2019 5/13/2020 7/21/2020   PHQ-9 Total Score 16 14 3   Q9: Thoughts of better off dead/self-harm past 2 weeks Not at all Not at all Not at all     TONO-7 SCORE 6/29/2017 5/23/2019 7/21/2020   Total Score 7 19 7     Last PHQ-9 7/21/2020   1.  Little interest or pleasure in doing things 0   2.  Feeling down, depressed, or hopeless 0   3.  Trouble falling or staying asleep, or sleeping too much 1   4.  Feeling tired or having little energy 1   5.  Poor appetite or overeating 0   6.  Feeling bad about yourself 0   7.  Trouble concentrating 1   8.  Moving slowly or restless 0   Q9: Thoughts of better off dead/self-harm past 2 weeks 0   PHQ-9 Total Score 3   Difficulty at work, home, or with people Somewhat difficult     TONO-7  7/21/2020   1. Feeling nervous, anxious, or on edge 1   2. Not being able to stop or control worrying 1   3. Worrying too much about different things 1   4. Trouble relaxing 2   5. Being so restless that it is hard to sit still 0   6. Becoming easily annoyed or irritable 1   7. Feeling afraid, as if something awful might happen 1   TONO-7 Total Score 7   If you checked any problems, how difficult have they made it for you to do your work, take care of things at home, or get along with other people? Not difficult at all       Suicide Assessment Five-step Evaluation and Treatment (SAFE-T)        Review of Systems   Constitutional, HEENT, cardiovascular, pulmonary, GI, , musculoskeletal, neuro, skin, endocrine and psych systems are negative, except as otherwise noted.      Objective    /78   Pulse 72   Temp 97.5  F (36.4  C) (Tympanic)   Resp 14   Ht 1.575 m (5' 2\")   Wt 58.1 kg (128 lb)   SpO2 98%   Breastfeeding No   BMI 23.41 kg/m  "   Body mass index is 23.41 kg/m .  Physical Exam   GENERAL: healthy, alert and no distress  RESP: lungs clear to auscultation - no rales, rhonchi or wheezes  CV: regular rate and rhythm, normal S1 S2, no S3 or S4, no murmur, click or rub, no peripheral edema and peripheral pulses strong  MS: no gross musculoskeletal defects noted, no edema  PSYCH: mentation appears normal, affect normal/bright

## 2021-11-16 NOTE — PATIENT INSTRUCTIONS
Increase prozac to 30 mg  Then 2 weeks later you can add xiomara birth control if needed    Follow up 4-8 weeks, sooner if needed    Schedule physical      1)     Re-check with me in 4 weeks.  If doing really well, this can be via phone or mychart.  Otherwise re-check in clinic.     Call with side effects or concerns.     Medication should start to work within 1-2 weeks but will not have full effect for about 6 weeks.     If medication makes you feel worse, stop right away and recheck with me.     If suicidal thoughts or plan occur, call 911 or go to emergency room.         2)  As your body allows, Try to start exercising see this article from [facility]:  Depression and anxiety: Exercise eases symptoms  Depression and anxiety symptoms often improve with exercise. Here are some realistic tips to help you get started and stay motivated.  By [facility] Staff   When you have depression or anxiety, exercise often seems like the last thing you want to do. But once you get motivated, exercise can make a big difference.  Exercise helps prevent and improve a number of health problems, including high blood pressure, diabetes and arthritis. Research on depression, anxiety and exercise shows that the psychological and physical benefits of exercise can also help improve mood and reduce anxiety.  The links between depression, anxiety and exercise aren't entirely clear -- but working out and other forms of physical activity can definitely ease symptoms of depression or anxiety and make you feel better. Exercise may also help keep depression and anxiety from coming back once you're feeling better.  How does exercise help depression and anxiety?  Regular exercise may help ease depression and anxiety by:  Releasing feel-good endorphins, natural cannabis-like brain chemicals (endogenous cannabinoids) and other natural brain chemicals that can enhance your sense of well-being   Taking your mind off worries so you can get away from the  "cycle of negative thoughts that feed depression and anxiety  Regular exercise has many psychological and emotional benefits, too. It can help you:  Gain confidence. Meeting exercise goals or challenges, even small ones, can boost your self-confidence. Getting in shape can also make you feel better about your appearance.   Get more social interaction. Exercise and physical activity may give you the chance to meet or socialize with others. Just exchanging a friendly smile or greeting as you walk around your neighborhood can help your mood.   [place] in a healthy way. Doing something positive to manage depression or anxiety is a healthy coping strategy. Trying to feel better by drinking alcohol, dwelling on how you feel, or hoping depression or anxiety will go away on its own can lead to worsening symptoms.  Is a structured exercise program the only option?  Some research shows that physical activity such as regular walking -- not just formal exercise programs -- may help improve mood. Physical activity and exercise are not the same thing, but both are beneficial to your health.  Physical activity is any activity that works your muscles and requires energy and can include work or household or leisure activities.   Exercise is a planned, structured and repetitive body movement done to improve or maintain physical fitness.  The word \"exercise\" may make you think of running laps around the gym. But exercise includes a wide range of activities that boost your activity level to help you feel better.  Certainly running, lifting weights, playing basketball and other fitness activities that get your heart pumping can help. But so can physical activity such as gardening, washing your car, walking around the block or engaging in other less intense activities. Any physical activity that gets you off the couch and moving can help improve your mood.  You don't have to do all your exercise or other physical activity at once. Broaden " how you think of exercise and find ways to add small amounts of physical activity throughout your day. For example, take the stairs instead of the elevator. Park a little farther away from work to fit in a short walk. Or, if you live close to your job, consider biking to work.

## 2021-11-17 ASSESSMENT — ANXIETY QUESTIONNAIRES: GAD7 TOTAL SCORE: 14

## 2021-11-17 ASSESSMENT — PATIENT HEALTH QUESTIONNAIRE - PHQ9: SUM OF ALL RESPONSES TO PHQ QUESTIONS 1-9: 17

## 2021-12-13 NOTE — PROGRESS NOTES
SUBJECTIVE:   CC: Brianna Abdalla is an 41 year old woman who presents for preventive health visit.     Patient has been advised of split billing requirements and indicates understanding: Yes     Pt is fasting for labs    Healthy Habits:     Getting at least 3 servings of Calcium per day:  Yes    Bi-annual eye exam:  Yes    Dental care twice a year:  Yes    Sleep apnea or symptoms of sleep apnea:  Daytime drowsiness    Diet:  Vegetarian/vegan    Frequency of exercise:  2-3 days/week    Duration of exercise:  30-45 minutes    Taking medications regularly:  Yes    Medication side effects:  None    PHQ-2 Total Score: 2    Additional concerns today:  No       Mood/anxiety: Not to goal.  I saw a month ago. We had done prozac increase  and considered effexor or lexapro in future. xiomara for irregular periods. We had increased prozac to 30 but gave her heart palpitations so she went back to 20 mg.  Is more tired all the time, prozac 20 mg is not enough. More irritable. Some anxiety/panic as well.   From last note:  Some dizzy episodes off and on. Could be related to sleep. Not frequent. Would do cbc and thyroid at next appt.   Sometimes she does not drink enough water.  Could be due to drop in blood pressure which we discussed.  She will try compression stockings which she has at home.  Denies suicidal or homicidal thoughts.  Patient instructed to go to the emergency room or call 911 if these occur.  Has not actually passed out from the episode.  Had a therapist previously.  Will think about.     Dizzy spells infrequent. Had one a week ago.  Feels like she was spinning.   Not really positional. Sometimes heart speeds.     Not due for pap.     Due for mammogram will monica appt today.    Today's PHQ-2 Score:   PHQ-2 ( 1999 [employer]) 12/16/2021   Q1: Little interest or pleasure in doing things 1   Q2: Feeling down, depressed or hopeless 1   PHQ-2 Score 2   PHQ-2 Total Score (12-17 Years)- Positive if 3 or more points;  Administer PHQ-A if positive -   Q1: Little interest or pleasure in doing things Several days   Q2: Feeling down, depressed or hopeless Several days   PHQ-2 Score 2       Abuse: Current or Past (Physical, Sexual or Emotional) - No  Do you feel safe in your environment? Yes        Social History     Tobacco Use     Smoking status: Never Smoker     Smokeless tobacco: Never Used   Substance Use Topics     Alcohol use: No     If you drink alcohol do you typically have >3 drinks per day or >7 drinks per week? No    Alcohol Use 2021   Prescreen: >3 drinks/day or >7 drinks/week? No   Prescreen: >3 drinks/day or >7 drinks/week? -   No flowsheet data found.    Reviewed orders with patient.  Reviewed health maintenance and updated orders accordingly - Yes      Breast Cancer Screening:  Any new diagnosis of family breast, ovarian, or bowel cancer? No    FHS-7: No flowsheet data found.  click delete button to remove this line now  Mammogram Screening - Offered annual screening and updated Health Maintenance for mutual plan based on risk factor consideration    Pertinent mammograms are reviewed under the imaging tab.    History of abnormal Pap smear: NO - age 30-65 PAP every 5 years with negative HPV co-testing recommended  PAP / HPV Latest Ref Rng & Units 2019   PAP (Historical) - NIL NIL NIL   HPV16 NEG:Negative Negative Negative Negative   HPV18 NEG:Negative Negative Negative Negative   HRHPV NEG:Negative Negative Negative Negative     Reviewed and updated as needed this visit by clinical staff  Tobacco  Allergies  Meds   Med Hx  Surg Hx  Fam Hx  Soc Hx       Reviewed and updated as needed this visit by Provider               Past Medical History:   Diagnosis Date     H/O colposcopy with cervical biopsy 2001    Negative for dysplasia      Past Surgical History:   Procedure Laterality Date     LEEP TX, CERVICAL      ARNOLDO 2/3     OB History    Para Term  AB Living   2 2 2 0  0 2   SAB IAB Ectopic Multiple Live Births   0 0 0 0 2      # Outcome Date GA Lbr Eduardo/2nd Weight Sex Delivery Anes PTL Lv   2 Term 2004 40w0d   F CS-Unspec   TERENCE   1 Term 1999 37w0d   F CS-Unspec   TERENCE       Review of Systems   Constitutional: Negative for chills and fever.   HENT: Negative for congestion, ear pain, hearing loss and sore throat.    Eyes: Negative for pain and visual disturbance.   Respiratory: Negative for cough and shortness of breath.    Cardiovascular: Positive for palpitations. Negative for chest pain and peripheral edema.   Gastrointestinal: Positive for nausea. Negative for abdominal pain, constipation, diarrhea, heartburn and hematochezia.   Breasts:  Negative for tenderness, breast mass and discharge.   Genitourinary: Negative for dysuria, frequency, genital sores, hematuria, pelvic pain, urgency, vaginal bleeding and vaginal discharge.   Musculoskeletal: Negative for arthralgias and joint swelling.   Skin: Negative for rash.   Neurological: Positive for dizziness and headaches. Negative for weakness and paresthesias.   Psychiatric/Behavioral: Positive for mood changes. The patient is not nervous/anxious.           OBJECTIVE:   /73   Pulse 66   Temp 98  F (36.7  C) (Tympanic)   Resp 14   Wt 58.1 kg (128 lb)   LMP 11/14/2021 (Approximate)   SpO2 97%   Breastfeeding No   BMI 23.41 kg/m    Physical Exam  GENERAL: healthy, alert and no distress  EYES: Eyes grossly normal to inspection, PERRL and conjunctivae and sclerae normal  HENT: ear canals and TM's normal, nose and mouth without ulcers or lesions  NECK: no adenopathy, no asymmetry, masses, or scars and thyroid normal to palpation  RESP: lungs clear to auscultation - no rales, rhonchi or wheezes  BREAST: normal without masses, tenderness or nipple discharge and no palpable axillary masses or adenopathy  CV: regular rate and rhythm, normal S1 S2, no S3 or S4, no murmur, click or rub, no peripheral edema and peripheral pulses  strong  ABDOMEN: soft, nontender, no hepatosplenomegaly, no masses and bowel sounds normal  MS: no gross musculoskeletal defects noted, no edema  SKIN: no suspicious lesions or rashes  NEURO: Normal strength and tone, mentation intact and speech normal  PSYCH: mentation appears normal, affect normal/bright      ASSESSMENT/PLAN:   (Z00.00) Routine general medical examination at a health care facility  (primary encounter diagnosis)  Comment:   Plan: *MA Screening Digital Bilateral          Chart documentation performed partially with Dragon Voice recognition Software. Although reviewed after completion, some word and grammatical error may remain.      (D69.6) Thrombocytopenia (H)  Comment: We will recheck with labs  Plan: CBC with platelets and differential            (F33.1) Major depressive disorder, recurrent episode, moderate (H)  Comment: Not to goal, add Wellbutrin, see below  Consider Lexapro and psych referral next  Plan: TSH with free T4 reflex, buPROPion (WELLBUTRIN         XL) 150 MG 24 hr tablet            (F41.1) TONO (generalized anxiety disorder)  Comment: Not to goal same as above  Plan:     (R42) Dizziness  Comment: Dehydration, vertigo, cardiac, thyroid, or other in differential.  We will start with labs.  If normal I will refer to [facility].  She will try compression stockings and making sure she drinks enough fluids.  She will follow-up sooner than that if symptoms worsen or change  Plan:     (Z23) Need for prophylactic vaccination and inoculation against influenza  Comment:   Plan: INFLUENZA VACCINE IM > 6 MONTHS VALENT IIV4         (AFLURIA/FLUZONE)            (Z13.220) Screening, lipid  Comment:   Plan: Lipid panel reflex to direct LDL Fasting            (Z13.1) Screening for diabetes mellitus  Comment:   Plan: Comprehensive metabolic panel (BMP + Alb, Alk         Phos, ALT, AST, Total. Bili, TP)            Billin additional min not related to preventative care spent on  "patient today including chart review, history, exam, and explaining treatment plan and follow-up and med management.     Patient has been advised of split billing requirements and indicates understanding: Yes  COUNSELING:  Reviewed preventive health counseling, as reflected in patient instructions       Regular exercise       Healthy diet/nutrition       Vision screening       Hearing screening    Estimated body mass index is 23.41 kg/m  as calculated from the following:    Height as of 11/16/21: 1.575 m (5' 2\").    Weight as of this encounter: 58.1 kg (128 lb).        She reports that she has never smoked. She has never used smokeless tobacco.      Counseling Resources:  ATP IV Guidelines  Pooled Cohorts Equation Calculator  Breast Cancer Risk Calculator  BRCA-Related Cancer Risk Assessment: FHS-7 Tool  FRAX Risk Assessment  ICSI Preventive Guidelines  Dietary Guidelines for Americans, 2010  USDA's MyPlate  ASA Prophylaxis  Lung CA Screening      Patient Instructions   1) add wellbutrin. Continue prozac 20 mg.     Re-check with me in 3-4 weeks.      Call with side effects or concerns.     Medication should start to work within 1-2 weeks but will not have full effect for about 6 weeks.     If medication makes you feel worse, stop right away and recheck with me.     If suicidal thoughts or plan occur, call 911 or go to emergency room.         2)  As your body allows, Try to start exercising see this article from [facility]:  Depression and anxiety: Exercise eases symptoms  Depression and anxiety symptoms often improve with exercise. Here are some realistic tips to help you get started and stay motivated.  By [facility] Staff   When you have depression or anxiety, exercise often seems like the last thing you want to do. But once you get motivated, exercise can make a big difference.  Exercise helps prevent and improve a number of health problems, including high blood pressure, diabetes and arthritis. Research on " depression, anxiety and exercise shows that the psychological and physical benefits of exercise can also help improve mood and reduce anxiety.  The links between depression, anxiety and exercise aren't entirely clear -- but working out and other forms of physical activity can definitely ease symptoms of depression or anxiety and make you feel better. Exercise may also help keep depression and anxiety from coming back once you're feeling better.  How does exercise help depression and anxiety?  Regular exercise may help ease depression and anxiety by:  Releasing feel-good endorphins, natural cannabis-like brain chemicals (endogenous cannabinoids) and other natural brain chemicals that can enhance your sense of well-being   Taking your mind off worries so you can get away from the cycle of negative thoughts that feed depression and anxiety  Regular exercise has many psychological and emotional benefits, too. It can help you:  Gain confidence. Meeting exercise goals or challenges, even small ones, can boost your self-confidence. Getting in shape can also make you feel better about your appearance.   Get more social interaction. Exercise and physical activity may give you the chance to meet or socialize with others. Just exchanging a friendly smile or greeting as you walk around your neighborhood can help your mood.   [place] in a healthy way. Doing something positive to manage depression or anxiety is a healthy coping strategy. Trying to feel better by drinking alcohol, dwelling on how you feel, or hoping depression or anxiety will go away on its own can lead to worsening symptoms.  Is a structured exercise program the only option?  Some research shows that physical activity such as regular walking -- not just formal exercise programs -- may help improve mood. Physical activity and exercise are not the same thing, but both are beneficial to your health.  Physical activity is any activity that works your muscles and  "requires energy and can include work or household or leisure activities.   Exercise is a planned, structured and repetitive body movement done to improve or maintain physical fitness.  The word \"exercise\" may make you think of running laps around the gym. But exercise includes a wide range of activities that boost your activity level to help you feel better.  Certainly running, lifting weights, playing basketball and other fitness activities that get your heart pumping can help. But so can physical activity such as gardening, washing your car, walking around the block or engaging in other less intense activities. Any physical activity that gets you off the couch and moving can help improve your mood.  You don't have to do all your exercise or other physical activity at once. Broaden how you think of exercise and find ways to add small amounts of physical activity throughout your day. For example, take the stairs instead of the elevator. Park a little farther away from work to fit in a short walk. Or, if you live close to your job, consider biking to work.            Preventive Health Recommendations  Female Ages 40 to 49    Yearly exam:     See your health care provider every year in order to  1. Review health changes.   2. Discuss preventive care.    3. Review your medicines if your doctor prescribed any.      Get a Pap test every three years (unless you have an abnormal result and your provider advises testing more often).      If you get Pap tests with HPV test, you only need to test every 5 years, unless you have an abnormal result. You do not need a Pap test if your uterus was removed (hysterectomy) and you have not had cancer.      You should be tested each year for STDs (sexually transmitted diseases), if you're at risk.     Ask your doctor if you should have a mammogram.      Have a colonoscopy (test for colon cancer) if someone in your family has had colon cancer or polyps before age 50.       Have a " cholesterol test every 5 years.       Have a diabetes test (fasting glucose) after age 45. If you are at risk for diabetes, you should have this test every 3 years.    Shots: Get a flu shot each year. Get a tetanus shot every 10 years.     Nutrition:     Eat at least 5 servings of fruits and vegetables each day.    Eat whole-grain bread, whole-wheat pasta and brown rice instead of white grains and rice.    Get adequate Calcium and Vitamin D.      Lifestyle    Exercise at least 150 minutes a week (an average of 30 minutes a day, 5 days a week). This will help you control your weight and prevent disease.    Limit alcohol to one drink per day.    No smoking.     Wear sunscreen to prevent skin cancer.    See your dentist every six months for an exam and cleaning.      BEN Lao [facility]

## 2021-12-13 NOTE — PATIENT INSTRUCTIONS
1) add wellbutrin. Continue prozac 20 mg.     Re-check with me in 3-4 weeks.      Call with side effects or concerns.     Medication should start to work within 1-2 weeks but will not have full effect for about 6 weeks.     If medication makes you feel worse, stop right away and recheck with me.     If suicidal thoughts or plan occur, call 911 or go to emergency room.         2)  As your body allows, Try to start exercising see this article from [facility]:  Depression and anxiety: Exercise eases symptoms  Depression and anxiety symptoms often improve with exercise. Here are some realistic tips to help you get started and stay motivated.  By [facility] Staff   When you have depression or anxiety, exercise often seems like the last thing you want to do. But once you get motivated, exercise can make a big difference.  Exercise helps prevent and improve a number of health problems, including high blood pressure, diabetes and arthritis. Research on depression, anxiety and exercise shows that the psychological and physical benefits of exercise can also help improve mood and reduce anxiety.  The links between depression, anxiety and exercise aren't entirely clear -- but working out and other forms of physical activity can definitely ease symptoms of depression or anxiety and make you feel better. Exercise may also help keep depression and anxiety from coming back once you're feeling better.  How does exercise help depression and anxiety?  Regular exercise may help ease depression and anxiety by:  Releasing feel-good endorphins, natural cannabis-like brain chemicals (endogenous cannabinoids) and other natural brain chemicals that can enhance your sense of well-being   Taking your mind off worries so you can get away from the cycle of negative thoughts that feed depression and anxiety  Regular exercise has many psychological and emotional benefits, too. It can help you:  Gain confidence. Meeting exercise goals or  "challenges, even small ones, can boost your self-confidence. Getting in shape can also make you feel better about your appearance.   Get more social interaction. Exercise and physical activity may give you the chance to meet or socialize with others. Just exchanging a friendly smile or greeting as you walk around your neighborhood can help your mood.   [place] in a healthy way. Doing something positive to manage depression or anxiety is a healthy coping strategy. Trying to feel better by drinking alcohol, dwelling on how you feel, or hoping depression or anxiety will go away on its own can lead to worsening symptoms.  Is a structured exercise program the only option?  Some research shows that physical activity such as regular walking -- not just formal exercise programs -- may help improve mood. Physical activity and exercise are not the same thing, but both are beneficial to your health.  Physical activity is any activity that works your muscles and requires energy and can include work or household or leisure activities.   Exercise is a planned, structured and repetitive body movement done to improve or maintain physical fitness.  The word \"exercise\" may make you think of running laps around the gym. But exercise includes a wide range of activities that boost your activity level to help you feel better.  Certainly running, lifting weights, playing basketball and other fitness activities that get your heart pumping can help. But so can physical activity such as gardening, washing your car, walking around the block or engaging in other less intense activities. Any physical activity that gets you off the couch and moving can help improve your mood.  You don't have to do all your exercise or other physical activity at once. Broaden how you think of exercise and find ways to add small amounts of physical activity throughout your day. For example, take the stairs instead of the elevator. Park a little farther away from work " to fit in a short walk. Or, if you live close to your job, consider biking to work.            Preventive Health Recommendations  Female Ages 40 to 49    Yearly exam:     See your health care provider every year in order to  1. Review health changes.   2. Discuss preventive care.    3. Review your medicines if your doctor prescribed any.      Get a Pap test every three years (unless you have an abnormal result and your provider advises testing more often).      If you get Pap tests with HPV test, you only need to test every 5 years, unless you have an abnormal result. You do not need a Pap test if your uterus was removed (hysterectomy) and you have not had cancer.      You should be tested each year for STDs (sexually transmitted diseases), if you're at risk.     Ask your doctor if you should have a mammogram.      Have a colonoscopy (test for colon cancer) if someone in your family has had colon cancer or polyps before age 50.       Have a cholesterol test every 5 years.       Have a diabetes test (fasting glucose) after age 45. If you are at risk for diabetes, you should have this test every 3 years.    Shots: Get a flu shot each year. Get a tetanus shot every 10 years.     Nutrition:     Eat at least 5 servings of fruits and vegetables each day.    Eat whole-grain bread, whole-wheat pasta and brown rice instead of white grains and rice.    Get adequate Calcium and Vitamin D.      Lifestyle    Exercise at least 150 minutes a week (an average of 30 minutes a day, 5 days a week). This will help you control your weight and prevent disease.    Limit alcohol to one drink per day.    No smoking.     Wear sunscreen to prevent skin cancer.    See your dentist every six months for an exam and cleaning.

## 2021-12-16 ENCOUNTER — OFFICE VISIT (OUTPATIENT)
Dept: FAMILY MEDICINE | Facility: CLINIC | Age: 41
End: 2021-12-16
Payer: COMMERCIAL

## 2021-12-16 VITALS
BODY MASS INDEX: 23.41 KG/M2 | TEMPERATURE: 98 F | OXYGEN SATURATION: 97 % | RESPIRATION RATE: 14 BRPM | SYSTOLIC BLOOD PRESSURE: 108 MMHG | DIASTOLIC BLOOD PRESSURE: 73 MMHG | WEIGHT: 128 LBS | HEART RATE: 66 BPM

## 2021-12-16 DIAGNOSIS — R42 DIZZINESS: Primary | ICD-10-CM

## 2021-12-16 DIAGNOSIS — Z00.00 ROUTINE GENERAL MEDICAL EXAMINATION AT A HEALTH CARE FACILITY: Primary | ICD-10-CM

## 2021-12-16 DIAGNOSIS — F41.1 GAD (GENERALIZED ANXIETY DISORDER): ICD-10-CM

## 2021-12-16 DIAGNOSIS — Z13.220 SCREENING, LIPID: ICD-10-CM

## 2021-12-16 DIAGNOSIS — F33.1 MAJOR DEPRESSIVE DISORDER, RECURRENT EPISODE, MODERATE (H): ICD-10-CM

## 2021-12-16 DIAGNOSIS — R42 DIZZINESS: ICD-10-CM

## 2021-12-16 DIAGNOSIS — D69.6 THROMBOCYTOPENIA (H): ICD-10-CM

## 2021-12-16 DIAGNOSIS — Z23 NEED FOR PROPHYLACTIC VACCINATION AND INOCULATION AGAINST INFLUENZA: ICD-10-CM

## 2021-12-16 DIAGNOSIS — Z13.1 SCREENING FOR DIABETES MELLITUS: ICD-10-CM

## 2021-12-16 LAB
ALBUMIN SERPL-MCNC: 4 G/DL (ref 3.4–5)
ALP SERPL-CCNC: 57 U/L (ref 40–150)
ALT SERPL W P-5'-P-CCNC: 22 U/L (ref 0–50)
ANION GAP SERPL CALCULATED.3IONS-SCNC: 5 MMOL/L (ref 3–14)
AST SERPL W P-5'-P-CCNC: 16 U/L (ref 0–45)
BASOPHILS # BLD AUTO: 0 10E3/UL (ref 0–0.2)
BASOPHILS NFR BLD AUTO: 1 %
BILIRUB SERPL-MCNC: 0.5 MG/DL (ref 0.2–1.3)
BUN SERPL-MCNC: 16 MG/DL (ref 7–30)
CALCIUM SERPL-MCNC: 9.1 MG/DL (ref 8.5–10.1)
CHLORIDE BLD-SCNC: 107 MMOL/L (ref 94–109)
CHOLEST SERPL-MCNC: 149 MG/DL
CO2 SERPL-SCNC: 27 MMOL/L (ref 20–32)
CREAT SERPL-MCNC: 0.79 MG/DL (ref 0.52–1.04)
EOSINOPHIL # BLD AUTO: 0.1 10E3/UL (ref 0–0.7)
EOSINOPHIL NFR BLD AUTO: 3 %
ERYTHROCYTE [DISTWIDTH] IN BLOOD BY AUTOMATED COUNT: 11.8 % (ref 10–15)
FASTING STATUS PATIENT QL REPORTED: YES
GFR SERPL CREATININE-BSD FRML MDRD: >90 ML/MIN/1.73M2
GLUCOSE BLD-MCNC: 90 MG/DL (ref 70–99)
HCT VFR BLD AUTO: 41 % (ref 35–47)
HDLC SERPL-MCNC: 67 MG/DL
HGB BLD-MCNC: 13.6 G/DL (ref 11.7–15.7)
LDLC SERPL CALC-MCNC: 72 MG/DL
LYMPHOCYTES # BLD AUTO: 1.2 10E3/UL (ref 0.8–5.3)
LYMPHOCYTES NFR BLD AUTO: 30 %
MCH RBC QN AUTO: 30.8 PG (ref 26.5–33)
MCHC RBC AUTO-ENTMCNC: 33.2 G/DL (ref 31.5–36.5)
MCV RBC AUTO: 93 FL (ref 78–100)
MONOCYTES # BLD AUTO: 0.3 10E3/UL (ref 0–1.3)
MONOCYTES NFR BLD AUTO: 8 %
NEUTROPHILS # BLD AUTO: 2.3 10E3/UL (ref 1.6–8.3)
NEUTROPHILS NFR BLD AUTO: 59 %
NONHDLC SERPL-MCNC: 82 MG/DL
PLATELET # BLD AUTO: 154 10E3/UL (ref 150–450)
POTASSIUM BLD-SCNC: 4 MMOL/L (ref 3.4–5.3)
PROT SERPL-MCNC: 7.4 G/DL (ref 6.8–8.8)
RBC # BLD AUTO: 4.41 10E6/UL (ref 3.8–5.2)
SODIUM SERPL-SCNC: 139 MMOL/L (ref 133–144)
TRIGL SERPL-MCNC: 49 MG/DL
TSH SERPL DL<=0.005 MIU/L-ACNC: 2.11 MU/L (ref 0.4–4)
WBC # BLD AUTO: 4 10E3/UL (ref 4–11)

## 2021-12-16 PROCEDURE — 90471 IMMUNIZATION ADMIN: CPT | Performed by: PHYSICIAN ASSISTANT

## 2021-12-16 PROCEDURE — 90686 IIV4 VACC NO PRSV 0.5 ML IM: CPT | Performed by: PHYSICIAN ASSISTANT

## 2021-12-16 PROCEDURE — 36415 COLL VENOUS BLD VENIPUNCTURE: CPT | Performed by: PHYSICIAN ASSISTANT

## 2021-12-16 PROCEDURE — 80050 GENERAL HEALTH PANEL: CPT | Performed by: PHYSICIAN ASSISTANT

## 2021-12-16 PROCEDURE — 99214 OFFICE O/P EST MOD 30 MIN: CPT | Mod: 25 | Performed by: PHYSICIAN ASSISTANT

## 2021-12-16 PROCEDURE — 80061 LIPID PANEL: CPT | Performed by: PHYSICIAN ASSISTANT

## 2021-12-16 PROCEDURE — 99396 PREV VISIT EST AGE 40-64: CPT | Mod: 25 | Performed by: PHYSICIAN ASSISTANT

## 2021-12-16 RX ORDER — BUPROPION HYDROCHLORIDE 150 MG/1
150 TABLET ORAL EVERY MORNING
Qty: 30 TABLET | Refills: 0 | Status: SHIPPED | OUTPATIENT
Start: 2021-12-16 | End: 2022-01-12

## 2021-12-16 ASSESSMENT — ENCOUNTER SYMPTOMS
DYSURIA: 0
SHORTNESS OF BREATH: 0
COUGH: 0
HEADACHES: 1
CHILLS: 0
CONSTIPATION: 0
DIARRHEA: 0
PARESTHESIAS: 0
JOINT SWELLING: 0
ARTHRALGIAS: 0
HEMATOCHEZIA: 0
FREQUENCY: 0
EYE PAIN: 0
FEVER: 0
BREAST MASS: 0
HEARTBURN: 0
NAUSEA: 1
NERVOUS/ANXIOUS: 0
WEAKNESS: 0
DIZZINESS: 1
HEMATURIA: 0
SORE THROAT: 0
ABDOMINAL PAIN: 0
PALPITATIONS: 1

## 2021-12-16 NOTE — RESULT ENCOUNTER NOTE
PLEASE CALL PATIENT:  Dear Oscar Reed,       Your recent test results are attached, if you have any questions or concerns please feel free to contact me via e-mail or call [phone].  Thyroid normal.  Cholesterol looks great. White and red blood cell counts normal.  No anemia.  Sodium, potassium, kidney and liver function normal.  Glucose (blood sugar) normal.    I have referred you to [facility] since labs normal. Please schedule.        It was a pleasure to see you at your recent office visit.      Sincerely,  Kelin Cisneros PA-C

## 2021-12-16 NOTE — LETTER
December 17, 2021      Brianna Abdalla  [address] [facility] [address]        Oscar Reed,        Your recent test results are attached, if you have any questions or concerns please feel free to contact me via e-mail or call [phone].  Thyroid normal.  Cholesterol looks great. White and red blood cell counts normal.  No anemia.  Sodium, potassium, kidney and liver function normal.  Glucose (blood sugar) normal.     I have referred you to dizzy and balance center since labs normal. Please schedule.         It was a pleasure to see you at your recent office visit.        Sincerely,  Kelin Cisneros PA-C    Resulted Orders   Lipid panel reflex to direct LDL Fasting   Result Value Ref Range    Cholesterol 149 <200 mg/dL    Triglycerides 49 <150 mg/dL    Direct Measure HDL 67 >=50 mg/dL    LDL Cholesterol Calculated 72 <=100 mg/dL    Non HDL Cholesterol 82 <130 mg/dL    Patient Fasting > 8hrs? Yes     Narrative    Cholesterol  Desirable:  <200 mg/dL    Triglycerides  Normal:  Less than 150 mg/dL  Borderline High:  150-199 mg/dL  High:  200-499 mg/dL  Very High:  Greater than or equal to 500 mg/dL    Direct Measure HDL  Female:  Greater than or equal to 50 mg/dL   Male:  Greater than or equal to 40 mg/dL    LDL Cholesterol  Desirable:  <100mg/dL  Above Desirable:  100-129 mg/dL   Borderline High:  130-159 mg/dL   High:  160-189 mg/dL   Very High:  >= 190 mg/dL    Non HDL Cholesterol  Desirable:  130 mg/dL  Above Desirable:  130-159 mg/dL  Borderline High:  160-189 mg/dL  High:  190-219 mg/dL  Very High:  Greater than or equal to 220 mg/dL   Comprehensive metabolic panel (BMP + Alb, Alk Phos, ALT, AST, Total. Bili, TP)   Result Value Ref Range    Sodium 139 133 - 144 mmol/L    Potassium 4.0 3.4 - 5.3 mmol/L    Chloride 107 94 - 109 mmol/L    Carbon Dioxide (CO2) 27 20 - 32 mmol/L    Anion Gap 5 3 - 14 mmol/L    Urea Nitrogen 16 7 - 30 mg/dL    Creatinine 0.79 0.52 - 1.04 mg/dL    Calcium 9.1 8.5 - 10.1  mg/dL    Glucose 90 70 - 99 mg/dL    Alkaline Phosphatase 57 40 - 150 U/L    AST 16 0 - 45 U/L    ALT 22 0 - 50 U/L    Protein Total 7.4 6.8 - 8.8 g/dL    Albumin 4.0 3.4 - 5.0 g/dL    Bilirubin Total 0.5 0.2 - 1.3 mg/dL    GFR Estimate >90 >60 mL/min/1.73m2      Comment:      As of July 11, 2021, eGFR is calculated by the CKD-EPI creatinine equation, without race adjustment. eGFR can be influenced by muscle mass, exercise, and diet. The reported eGFR is an estimation only and is only applicable if the renal function is stable.   TSH with free T4 reflex   Result Value Ref Range    TSH 2.11 0.40 - 4.00 mU/L   CBC with platelets and differential   Result Value Ref Range    WBC Count 4.0 4.0 - 11.0 10e3/uL    RBC Count 4.41 3.80 - 5.20 10e6/uL    Hemoglobin 13.6 11.7 - 15.7 g/dL    Hematocrit 41.0 35.0 - 47.0 %    MCV 93 78 - 100 fL    MCH 30.8 26.5 - 33.0 pg    MCHC 33.2 31.5 - 36.5 g/dL    RDW 11.8 10.0 - 15.0 %    Platelet Count 154 150 - 450 10e3/uL    % Neutrophils 59 %    % Lymphocytes 30 %    % Monocytes 8 %    % Eosinophils 3 %    % Basophils 1 %    Absolute Neutrophils 2.3 1.6 - 8.3 10e3/uL    Absolute Lymphocytes 1.2 0.8 - 5.3 10e3/uL    Absolute Monocytes 0.3 0.0 - 1.3 10e3/uL    Absolute Eosinophils 0.1 0.0 - 0.7 10e3/uL    Absolute Basophils 0.0 0.0 - 0.2 10e3/uL

## 2021-12-24 ENCOUNTER — ANCILLARY PROCEDURE (OUTPATIENT)
Dept: MAMMOGRAPHY | Facility: CLINIC | Age: 41
End: 2021-12-24
Payer: COMMERCIAL

## 2021-12-24 DIAGNOSIS — Z00.00 ROUTINE GENERAL MEDICAL EXAMINATION AT A HEALTH CARE FACILITY: ICD-10-CM

## 2021-12-24 PROCEDURE — 77067 SCR MAMMO BI INCL CAD: CPT | Mod: TC | Performed by: RADIOLOGY

## 2022-01-12 DIAGNOSIS — F33.1 MAJOR DEPRESSIVE DISORDER, RECURRENT EPISODE, MODERATE (H): ICD-10-CM

## 2022-01-12 RX ORDER — BUPROPION HYDROCHLORIDE 150 MG/1
TABLET ORAL
Qty: 30 TABLET | Refills: 0 | Status: SHIPPED | OUTPATIENT
Start: 2022-01-12 | End: 2022-01-13

## 2022-01-12 NOTE — TELEPHONE ENCOUNTER
"Requested Prescriptions   Pending Prescriptions Disp Refills    buPROPion (WELLBUTRIN XL) 150 MG 24 hr tablet [Pharmacy Med Name: BUPROPION XL 150MG TABLETS (24 H)] 30 tablet 0     Sig: TAKE 1 TABLET(150 MG) BY MOUTH EVERY MORNING        SSRIs Protocol Failed - 1/12/2022  3:15 AM        Failed - PHQ-9 score less than 5 in past 6 months     Please review last PHQ-9 score.           Passed - Medication is Bupropion     If the medication is Bupropion (Wellbutrin), and the patient is taking for smoking cessation; OK to refill.            Passed - Medication is active on med list        Passed - Patient is age 18 or older        Passed - No active pregnancy on record        Passed - No positive pregnancy test in last 12 months        Passed - Recent (6 mo) or future (30 days) visit within the authorizing provider's specialty     Patient had office visit in the last 6 months or has a visit in the next 30 days with authorizing provider or within the authorizing provider's specialty.  See \"Patient Info\" tab in inbasket, or \"Choose Columns\" in Meds & Orders section of the refill encounter.                  "

## 2022-01-13 ENCOUNTER — VIRTUAL VISIT (OUTPATIENT)
Dept: FAMILY MEDICINE | Facility: CLINIC | Age: 42
End: 2022-01-13
Payer: COMMERCIAL

## 2022-01-13 DIAGNOSIS — F33.1 MAJOR DEPRESSIVE DISORDER, RECURRENT EPISODE, MODERATE (H): ICD-10-CM

## 2022-01-13 DIAGNOSIS — F41.1 GAD (GENERALIZED ANXIETY DISORDER): Primary | ICD-10-CM

## 2022-01-13 PROCEDURE — 99214 OFFICE O/P EST MOD 30 MIN: CPT | Mod: 95 | Performed by: PHYSICIAN ASSISTANT

## 2022-01-13 RX ORDER — BUPROPION HYDROCHLORIDE 300 MG/1
300 TABLET ORAL EVERY MORNING
Qty: 30 TABLET | Refills: 3 | Status: SHIPPED | OUTPATIENT
Start: 2022-01-13 | End: 2023-08-15

## 2022-01-13 ASSESSMENT — ANXIETY QUESTIONNAIRES
6. BECOMING EASILY ANNOYED OR IRRITABLE: MORE THAN HALF THE DAYS
7. FEELING AFRAID AS IF SOMETHING AWFUL MIGHT HAPPEN: NOT AT ALL
2. NOT BEING ABLE TO STOP OR CONTROL WORRYING: MORE THAN HALF THE DAYS
GAD7 TOTAL SCORE: 8
IF YOU CHECKED OFF ANY PROBLEMS ON THIS QUESTIONNAIRE, HOW DIFFICULT HAVE THESE PROBLEMS MADE IT FOR YOU TO DO YOUR WORK, TAKE CARE OF THINGS AT HOME, OR GET ALONG WITH OTHER PEOPLE: VERY DIFFICULT
5. BEING SO RESTLESS THAT IT IS HARD TO SIT STILL: NOT AT ALL
3. WORRYING TOO MUCH ABOUT DIFFERENT THINGS: MORE THAN HALF THE DAYS
1. FEELING NERVOUS, ANXIOUS, OR ON EDGE: MORE THAN HALF THE DAYS

## 2022-01-13 ASSESSMENT — PATIENT HEALTH QUESTIONNAIRE - PHQ9
SUM OF ALL RESPONSES TO PHQ QUESTIONS 1-9: 8
5. POOR APPETITE OR OVEREATING: NOT AT ALL

## 2022-01-13 NOTE — PROGRESS NOTES
Brianna is a 41 year old who is being evaluated via a billable video visit.      How would you like to obtain your AVS? MyChart  If the video visit is dropped, the invitation should be resent by: Text to cell phone: [phone]  Will anyone else be joining your video visit? No    Video Start Time: 9:39 AM    Assessment & Plan     TONO (generalized anxiety disorder)  Not to goal  Consider therapy, she will think about  To psych as not improving as we would hope  - buPROPion (WELLBUTRIN XL) 300 MG 24 hr tablet; Take 1 tablet (300 mg) by mouth every morning Note increase in dose  - FLUoxetine (PROZAC) 20 MG capsule; Take with 10 mg for total 30 mg prozac  - Adult Mental Health Referral; Future    Major depressive disorder, recurrent episode, moderate (H)  Increase to 300 wellbutrin  To psych as above  - buPROPion (WELLBUTRIN XL) 300 MG 24 hr tablet; Take 1 tablet (300 mg) by mouth every morning Note increase in dose  - FLUoxetine (PROZAC) 20 MG capsule; Take with 10 mg for total 30 mg prozac  - Adult Mental Health Referral; Future  Increase wellbutrin  Call with side effects/concerns  Denies suicidal or homicidal thoughts.  Patient instructed to go to the emergency room or call 911 if these occur.    Return in about 6 weeks (around 2/24/2022) for med recheck.    Kelin Cisneros PA-C  [facility]    Subjective   Brianna is a 41 year old who presents for the following health issues  accompanied by her Self.    HPI     Depression and Anxiety Follow-Up    How are you doing with your depression since your last visit? Improved     How are you doing with your anxiety since your last visit?  Improved     Are you having other symptoms that might be associated with depression or anxiety? No    Have you had a significant life event? No     Do you have any concerns with your use of alcohol or other drugs? No    We added wellbutrin at last visit. 150 mg daily xl. No side effects.   Has not seen psych.    Still feels more irritible. But wellbutrin is giving her more energy.       On prozac 20 mg.   30 mg gave her fast heart rate.     Denies suicidal or homicidal thoughts.  Patient instructed to go to the emergency room or call 911 if these occur.        Copied From my last visit:  Mood/anxiety: Not to goal.  I saw a month ago. We had done prozac increase  and considered effexor or lexapro in future. xiomara for irregular periods. We had increased prozac to 30 but gave her heart palpitations so she went back to 20 mg.  Is more tired all the time, prozac 20 mg is not enough. More irritable. Some anxiety/panic as well.   From last note:  Some dizzy episodes off and on. Could be related to sleep. Not frequent. Would do cbc and thyroid at next appt.   Sometimes she does not drink enough water.  Could be due to drop in blood pressure which we discussed.  She will try compression stockings which she has at home.  Denies suicidal or homicidal thoughts.  Patient instructed to go to the emergency room or call 911 if these occur.  Has not actually passed out from the episode.  Had a therapist previously.  Will think about.      Dizzy spells infrequent. Had one a week ago.  Feels like she was spinning.   Not really positional. Sometimes heart speeds.   Social History     Tobacco Use     Smoking status: Never Smoker     Smokeless tobacco: Never Used   Substance Use Topics     Alcohol use: No     Drug use: No     PHQ 5/13/2020 7/21/2020 11/16/2021   PHQ-9 Total Score 14 3 17   Q9: Thoughts of better off dead/self-harm past 2 weeks Not at all Not at all Not at all     TONO-7 SCORE 5/23/2019 7/21/2020 11/16/2021   Total Score 19 7 14         Review of Systems   Constitutional, HEENT, cardiovascular, pulmonary, GI, , musculoskeletal, neuro, skin, endocrine and psych systems are negative, except as otherwise noted.      Objective           Vitals:  No vitals were obtained today due to virtual visit.    Physical Exam   GENERAL: Healthy,  alert and no distress  EYES: Eyes grossly normal to inspection.  No discharge or erythema, or obvious scleral/conjunctival abnormalities.  RESP: No audible wheeze, cough, or visible cyanosis.  No visible retractions or increased work of breathing.    SKIN: Visible skin clear. No significant rash, abnormal pigmentation or lesions.  NEURO: Cranial nerves grossly intact.  Mentation and speech appropriate for age.  PSYCH: Mentation appears normal, affect normal/bright, judgement and insight intact, normal speech and appearance well-groomed.          Video-Visit Details    Type of service:  Video Visit    Video End Time:9:46 AM    Originating Location (pt. Location): Home    Distant Location (provider location):  [facility]     Platform used for Video Visit: [employer]

## 2022-01-14 ASSESSMENT — ANXIETY QUESTIONNAIRES: GAD7 TOTAL SCORE: 8

## 2022-03-09 ENCOUNTER — OFFICE VISIT (OUTPATIENT)
Dept: URGENT CARE | Facility: URGENT CARE | Age: 42
End: 2022-03-09
Payer: COMMERCIAL

## 2022-03-09 VITALS
BODY MASS INDEX: 24.22 KG/M2 | DIASTOLIC BLOOD PRESSURE: 78 MMHG | TEMPERATURE: 97.6 F | WEIGHT: 132.4 LBS | SYSTOLIC BLOOD PRESSURE: 119 MMHG | HEART RATE: 66 BPM | OXYGEN SATURATION: 100 %

## 2022-03-09 DIAGNOSIS — J02.9 ACUTE PHARYNGITIS, UNSPECIFIED ETIOLOGY: Primary | ICD-10-CM

## 2022-03-09 DIAGNOSIS — R11.0 NAUSEA: ICD-10-CM

## 2022-03-09 LAB
DEPRECATED S PYO AG THROAT QL EIA: NEGATIVE
MONOCYTES NFR BLD AUTO: NEGATIVE %

## 2022-03-09 PROCEDURE — 86308 HETEROPHILE ANTIBODY SCREEN: CPT | Performed by: EMERGENCY MEDICINE

## 2022-03-09 PROCEDURE — U0003 INFECTIOUS AGENT DETECTION BY NUCLEIC ACID (DNA OR RNA); SEVERE ACUTE RESPIRATORY SYNDROME CORONAVIRUS 2 (SARS-COV-2) (CORONAVIRUS DISEASE [COVID-19]), AMPLIFIED PROBE TECHNIQUE, MAKING USE OF HIGH THROUGHPUT TECHNOLOGIES AS DESCRIBED BY CMS-2020-01-R: HCPCS | Performed by: EMERGENCY MEDICINE

## 2022-03-09 PROCEDURE — 36416 COLLJ CAPILLARY BLOOD SPEC: CPT | Performed by: EMERGENCY MEDICINE

## 2022-03-09 PROCEDURE — U0005 INFEC AGEN DETEC AMPLI PROBE: HCPCS | Performed by: EMERGENCY MEDICINE

## 2022-03-09 PROCEDURE — 87651 STREP A DNA AMP PROBE: CPT | Performed by: EMERGENCY MEDICINE

## 2022-03-09 PROCEDURE — 99214 OFFICE O/P EST MOD 30 MIN: CPT | Performed by: EMERGENCY MEDICINE

## 2022-03-09 RX ORDER — DEXAMETHASONE SODIUM PHOSPHATE 4 MG/ML
6 VIAL (ML) INJECTION ONCE
Status: COMPLETED | OUTPATIENT
Start: 2022-03-09 | End: 2022-03-09

## 2022-03-09 RX ORDER — ONDANSETRON 4 MG/1
4 TABLET, ORALLY DISINTEGRATING ORAL EVERY 8 HOURS PRN
Qty: 12 TABLET | Refills: 0 | Status: SHIPPED | OUTPATIENT
Start: 2022-03-09 | End: 2023-08-15

## 2022-03-09 RX ADMIN — Medication 6 MG: at 18:43

## 2022-03-09 NOTE — PROGRESS NOTES
Assessment & Plan     Diagnosis:    (J02.9) Acute pharyngitis, unspecified etiology  (primary encounter diagnosis)    (R11.0) Nausea      Medical Decision Making:  Brianna Abdalla is a 41 year old female who presents for evaluation of sore throat, fatigue and neck pains.  This is consistent with an upper respiratory tract infection.  There is no signs at this point of serious bacterial infection such as OM, pneumonia, sinusitis, meningitis. Rapid strep is negative with PCR pending. COVID-19 PCR also pending at this time. Mono spot is also negative. I suspect a viral URI causing her symptoms. Given her odynophagia; I did provide a one time dose of Decadron here in the clinic. She notes symptomatic improvement with this. No dysphagia or concerns for RPA, epiglottitis or PTA.    Given clear lungs, no fever, no hypoxia and no respiratory distress I do not feel the patient needs a CXR at this point as the probability of bacterial pneumonia is very unlikely.       Patient does have some nausea, but no other gastrointestinal symptoms at this point and no signs of dehydration. Zofran was prescribed for home for use as needed.  Close follow up with PCP is indicated.  Go to ED for fever > 102 F, protracted vomiting, worsening shortness of breath, chest pain or other concerns.  Patient verbalized understanding and agreement with the plan was discharged in stable condition.      Nitin Kathleen PA-C  [facility]    Subjective     Brianna Abdalla is a 41 year old female who presents to clinic today for the following health issues:  Chief Complaint   Patient presents with     Nausea     Fatigue     Pharyngitis     Headache     Nasal Congestion       HPI  Patient notes that for the past 2 weeks she has experiencing fatigue, sore throat on and off (worse for the past 24 hours), pain with swallowing food and fluids; but no difficulty. Notes that her neck has been hurting, but she has normal range of motion and no  "stiffness. She denies any tongue or lip swelling, recent head or neck trauma, chest pain, shortness of breath, abdominal pain, nausea, vomiting, diarrhea, fevers. No history of IVDU. Patient states that she noticed a \"white speck\" at the back of her throat and is concerned about strep throat.     Review of Systems    See HPI    Objective      Vitals: /78   Pulse 66   Temp 97.6  F (36.4  C) (Oral)   Wt 60.1 kg (132 lb 6.4 oz)   SpO2 100%   BMI 24.22 kg/m    Resp: 16    Patient Vitals for the past 24 hrs:   BP Temp Temp src Pulse SpO2 Weight   03/09/22 1753 119/78 97.6  F (36.4  C) Oral 66 100 % 60.1 kg (132 lb 6.4 oz)       Vital signs reviewed by: Nitin Kathleen PA-C    Physical Exam   Constitutional: The patient is oriented to person, place, and time. Alert and cooperative.   HENT:   Right Ear: External ear normal. TM is clear with slight bulging; no erythema or perforation.  Left Ear: External ear normal. TM is clear. No bulging or erythema.   No mastoid tenderness or erythema bilaterally.  Nose: Nose normal.    Mouth: Normal tongue and tonsil. Posterior oropharynx is erythematous, no exudates. Uvula is midline. No submandibular swelling or erythema.  Eyes: Conjunctivae, EOM and lids are normal.   Cardiovascular: Regular rate and rhythm  Pulmonary/Chest: Effort normal. No respiratory distress. No wheezes, rales or rhonchi.  GI: Abdomen is soft and non-tender throughout.  Musculoskeletal: Normal range of motion. Normal tone.  Skin: No rash noted on visualized skin (face, hands and neck).  Psychiatric:The patient has a normal mood and affect.     Labs/Imaging:  Results for orders placed or performed in visit on 03/09/22   Mononucleosis screen     Status: Normal   Result Value Ref Range    Mononucleosis Screen Negative Negative   Streptococcus A Rapid Screen w/Reflex to PCR - Clinic Collect     Status: Normal    Specimen: Throat; Swab   Result Value Ref Range    Group A Strep antigen Negative Negative "     Strep PCR: Pending    COVID-19 PCR: Pending    Interventions:    Decadron 6mg PO      Nitin Kathleen PA-C, March 9, 2022

## 2022-03-10 ENCOUNTER — TELEPHONE (OUTPATIENT)
Dept: NURSING | Facility: CLINIC | Age: 42
End: 2022-03-10
Payer: COMMERCIAL

## 2022-03-10 LAB
GROUP A STREP BY PCR: NOT DETECTED
SARS-COV-2 RNA RESP QL NAA+PROBE: POSITIVE

## 2022-03-11 NOTE — TELEPHONE ENCOUNTER
Coronavirus (COVID-19) Notification    Caller Name (Patient, parent, daughter/son, grandparent, etc)  Pt    Reason for call  Notify of Positive Coronavirus (COVID-19) lab results, assess symptoms,  review [facility] recommendations    Lab Result    Lab test:  2019-nCoV rRt-PCR or SARS-CoV-2 PCR    Oropharyngeal AND/OR nasopharyngeal swabs is POSITIVE for 2019-nCoV RNA/SARS-COV-2 PCR (COVID-19 virus)      Gather patient reported symptoms   Assessment   Current Symptoms at time of phone call, reported by patient: (if no symptoms, document: No symptoms] Sore throat, headache, coughing   Date of symptom(s) onset (if applicable) 03/09/22     If at time of call, Patients symptoms have worsened, the Patient should contact 911 or have someone drive them to Emergency Dept promptly:      If Patient calling 911, inform 911 personal that you have tested positive for the Coronavirus (COVID-19).  Place mask on and await 911 to arrive.    If Emergency Dept, If possible, please have another adult drive you to the Emergency Dept but you need to wear mask when in contact with other people.      Treatment Options:   Patient classified as COVID treatment eligible by Epic high risk algorithm: No  You may be eligible to receive a new treatment with a monoclonal antibody for preventing hospitalization in patients at high risk for complications from COVID-19.  This medication is still experimental and available on a limited basis; it is given through an IV and must be given at an infusion center.  Please note that not all people who are eligible will receive the medication since it is in limited supply.   Is the patient symptomatic?  Yes  Is the patient interested in treatment: No, pt does not think she may need it right now she would like to hold off on it.      Review information with Patient    Your result was positive. This means you have COVID-19 (coronavirus).    How can I protect others?    These guidelines are for isolating  before returning to work, school or .    If you DO have symptoms    Stay home and away from others     For at least 5 days after your symptoms started, AND    You are fever free for 24 hours (with no medicine that reduces fever), AND    Your other symptoms are better    Wear a mask for 10 full days anytime you are around others    If you DON'T have symptoms    Stay home and away from others for at least 5 days after your positive test    Wear a mask for 10 full days anytime you are around others    There may be different guidelines for healthcare facilities.  Please check with the specific sites before arriving.    If you have been told by a doctor that you were severely ill with COVID-19 or are immunocompromised, you should isolate for at least 10 days.    You should not go back to work until you meet the guidelines above for ending your home isolation. You don't need to be retested for COVID-19 before going back to work--studies show that you won't spread the virus if it's been at least 10 days since your symptoms started (or 20 days, if you have a weak immune system).    Employers, schools, and daycares: This is an official notice for this person's medical guidelines for returning in-person.  They must meet the above guidelines before going back to work, school or  in person.    You will receive a positive COVID-19 letter via [employer] or the mail soon with additional self-care information (exception, no letters will be sent to presurgical/preprocedure patients).    Would you like me to review some of that information with you now?  No    If you were tested for an upcoming procedure, please contact your provider for next steps.    Denisha Mathews

## 2022-03-11 NOTE — TELEPHONE ENCOUNTER
Patient classified as COVID treatment eligible by Epic high risk algorithm:  No    Coronavirus (COVID-19) Notification    Reason for call  Notify of POSITIVE COVID-19 lab result, assess symptoms,  review [facility] recommendations    Lab Result   Lab test for 2019-nCoV rRt-PCR or SARS-COV-2 PCR  Oropharyngeal AND/OR nasopharyngeal swabs were POSITIVE for 2019-nCoV RNA [OR] SARS-COV-2 RNA (COVID-19) RNA     We have been unable to reach patient by phone at this time to notify of their Positive COVID-19 result.    Left voicemail message requesting a call back to [phone] [facility] for results.        A Positive COVID-19 letter will be sent via [employer] or the mail. (Exception, no letters sent to Presurgerical/Preprocedure Patients)    Denisha Mathews

## 2022-05-21 DIAGNOSIS — F41.1 GAD (GENERALIZED ANXIETY DISORDER): ICD-10-CM

## 2022-05-21 DIAGNOSIS — F33.1 MAJOR DEPRESSIVE DISORDER, RECURRENT EPISODE, MODERATE (H): ICD-10-CM

## 2022-05-23 NOTE — TELEPHONE ENCOUNTER
"Routing refill request to provider for review/approval because:  Requested Prescriptions   Pending Prescriptions Disp Refills    FLUoxetine (PROZAC) 10 MG capsule [Pharmacy Med Name: FLUOXETINE 10MG CAPSULES] 90 capsule 1     Sig: TAKE 1 CAPSULE(10 MG) BY MOUTH DAILY        SSRIs Protocol Failed - 5/21/2022  9:12 AM        Failed - PHQ-9 score less than 5 in past 6 months     Please review last PHQ-9 score.           Passed - Medication is active on med list        Passed - Patient is age 18 or older        Passed - No active pregnancy on record        Passed - No positive pregnancy test in last 12 months        Passed - Recent (6 mo) or future (30 days) visit within the authorizing provider's specialty     Patient had office visit in the last 6 months or has a visit in the next 30 days with authorizing provider or within the authorizing provider's specialty.  See \"Patient Info\" tab in inbasket, or \"Choose Columns\" in Meds & Orders section of the refill encounter.                  PHQ 7/21/2020 11/16/2021 1/13/2022   PHQ-9 Total Score 3 17 8   Q9: Thoughts of better off dead/self-harm past 2 weeks Not at all Not at all Not at all         Jo-Ann GUSMANN, RN        "

## 2022-05-25 RX ORDER — FLUOXETINE 10 MG/1
CAPSULE ORAL
Qty: 90 CAPSULE | Refills: 0 | Status: SHIPPED | OUTPATIENT
Start: 2022-05-25 | End: 2023-08-15

## 2022-08-21 DIAGNOSIS — F33.1 MAJOR DEPRESSIVE DISORDER, RECURRENT EPISODE, MODERATE (H): ICD-10-CM

## 2022-08-21 DIAGNOSIS — F41.1 GAD (GENERALIZED ANXIETY DISORDER): ICD-10-CM

## 2022-08-22 RX ORDER — FLUOXETINE 10 MG/1
CAPSULE ORAL
Qty: 90 CAPSULE | Refills: 0 | OUTPATIENT
Start: 2022-08-22

## 2022-10-22 ENCOUNTER — HEALTH MAINTENANCE LETTER (OUTPATIENT)
Age: 42
End: 2022-10-22

## 2023-03-03 DIAGNOSIS — F33.1 MAJOR DEPRESSIVE DISORDER, RECURRENT EPISODE, MODERATE (H): ICD-10-CM

## 2023-03-03 DIAGNOSIS — F41.1 GAD (GENERALIZED ANXIETY DISORDER): ICD-10-CM

## 2023-03-03 NOTE — TELEPHONE ENCOUNTER
Medication Question or Refill    Contacts       Type Contact Phone/Fax    03/03/2023 10:01 AM CST Phone (Incoming) Brianna Abdalla (Self) [phone] (H)          What medication are you calling about (include dose and sig)?: Prozac 20 mg    Preferred Pharmacy:  [id number] - [facility] [address] [place] [address] AT [address]  [address] [facility] [address]  Phone: [phone] Fax: [phone]      Controlled Substance Agreement on file:   CSA -- Patient Level:    CSA: None found at the patient level.       Who prescribed the medication?: Kelin Cisneros-last filled by per patient    Do you need a refill? Yes-patient took her last pill this morning.    When did you use the medication last? Today    Patient offered an appointment? No    Do you have any questions or concerns?  No      Could we send this information to you in [facility] or would you prefer to receive a phone call?:   Patient would prefer a phone call   Okay to leave a detailed message?: Yes at Cell number on file:    Telephone Information:   Mobile [phone]       Jennifer Wade MA/DOMINIK

## 2023-04-01 ENCOUNTER — HEALTH MAINTENANCE LETTER (OUTPATIENT)
Age: 43
End: 2023-04-01

## 2023-04-26 NOTE — PROGRESS NOTES
SUBJECTIVE:                                                    Brianna Abdalla is a 37 year old female who presents to clinic today for the following health issues:    ENT Symptoms             Symptoms: cc Present Absent Comment   Fever/Chills   x    Fatigue  x     Muscle Aches  x     Eye Irritation   x    Sneezing   x    Nasal Frank/Drg   x    Sinus Pressure/Pain  x  Facial pain and possible headache   Loss of smell   x    Dental pain   x    Sore Throat   x    Swollen Glands   x    Ear Pain/Fullness  x  L ear pain   Cough   x    Wheeze   x    Chest Pain   x    Shortness of breath   x    Rash   x    Other   x      Symptom duration:  x 1-2 weeks with dizziness and facial pressure and on/off ear pain   Symptom severity: mild   Treatments tried:  Ear drop meds   Contacts:  none         Was seen early November and was given cortisporin drops for otitis externa and ear pain.  Helped some but now she has pressure on her left face and dizzy spells. Sometimes has episodes of mild vertigo.  No vision changes.  No fever. Not much congestion.  exedrin helps head somewhat.  Headache has been daily for the last week. No trauma to head.   Used flonase.  It also sounds like she has tension headaches but longer history of this.   Previously had sore throat and ringing in her ears.   Can't hear very well out of her left ear. tinnitis on and off.  No body aches or cough.  Still has sore throat. No post nasal drip.   Nonsmoker.   Allergic to PCN--hives and itchy.   Doxycyline gave her headache.        Problem list and histories reviewed & adjusted, as indicated.  Additional history: as documented    Patient Active Problem List   Diagnosis     CARDIOVASCULAR SCREENING; LDL GOAL LESS THAN 160     Abnormal Pap smear of cervix     Thrombocytopenia (H)     BCC (basal cell carcinoma), face     Major depressive disorder, recurrent episode, moderate (H)     TONO (generalized anxiety disorder)     Past Surgical History:   Procedure Laterality  Date     LEEP TX, CERVICAL      ARNOLDO 2/3       Social History   Substance Use Topics     Smoking status: Never Smoker     Smokeless tobacco: Never Used     Alcohol use No     Family History   Problem Relation Age of Onset     DIABETES Maternal Grandmother      DIABETES Brother          Current Outpatient Prescriptions   Medication Sig Dispense Refill     cefdinir (OMNICEF) 300 MG capsule Take 1 capsule (300 mg) by mouth 2 times daily 20 capsule 0     neomycin-polymyxin-hydrocortisone (CORTISPORIN) 3.5-44025-4 otic suspension Place 4 drops in ear(s) 4 times daily 10 mL 0     FLUoxetine (PROZAC) 20 MG capsule Take 2 capsules (40 mg) by mouth daily 180 capsule 1     Allergies   Allergen Reactions     Amoxicillin      Iodine I 131 Tositumomab          ROS:  Constitutional, HEENT, cardiovascular, pulmonary, GI, , musculoskeletal, neuro, skin, endocrine and psych systems are negative, except as otherwise noted.      OBJECTIVE:   BP 96/61  Pulse 51  Temp 97.1  F (36.2  C) (Oral)  Wt 131 lb (59.4 kg)  SpO2 100%  Breastfeeding? No  BMI 24.75 kg/m2  Body mass index is 24.75 kg/(m^2).  GENERAL:  No acute distress.  Interacts appropriately.  Breathing without difficulty.  Alert.  HEENT:  Tympanic membranes intact without effusion or erythema. Canals are mostly clear and do not appear edematous. Left ear canal-moderate amount of cerumen present.  Oral mucosa moist. Posterior pharynx has erythema AND POST NASAL DRIP/inflammation from this it appears.  Posterior pharynx has no exudate.   NECK:  Soft and supple.  without tenderness.  Without lymphadenopathy.  Normal range of motion.    CARDIAC:   Regular rate and rhythm.  No murmurs, rubs, or gallops.   PULMONARY: Clear to auscultation bilaterally.  No  wheezes, crackles, or rhonchi.  Normal air exchange/breath sounds.  No use of accessory muscles.    PSYCH: Normal affect.  SKIN: No rashes.        ASSESSMENT/PLAN:   ASSESSMENT / PLAN:  (J32.9) Sinusitis, unspecified  chronicity, unspecified location  (primary encounter diagnosis)  Comment:   Plan: OTOLARYNGOLOGY REFERRAL, cefdinir (OMNICEF) 300        MG capsule            Take with food. Side effects discussed.  Call with worsening symptoms or if no improvement in 5 days.  Analgesics for pain with food as needed.      (H91.92) Change in hearing of left ear  Comment:   Plan: OTOLARYNGOLOGY REFERRAL            Billin min spent face-to-face with patient. 15 min on history, 5 on exam, 5 on discussing diagnosis and treatment plan.           Kelin Cisneros PA-C  [facility]   5

## 2023-08-15 ENCOUNTER — OFFICE VISIT (OUTPATIENT)
Dept: FAMILY MEDICINE | Facility: CLINIC | Age: 43
End: 2023-08-15
Payer: COMMERCIAL

## 2023-08-15 VITALS
DIASTOLIC BLOOD PRESSURE: 57 MMHG | HEIGHT: 61 IN | OXYGEN SATURATION: 98 % | WEIGHT: 130.4 LBS | BODY MASS INDEX: 24.62 KG/M2 | SYSTOLIC BLOOD PRESSURE: 93 MMHG | HEART RATE: 74 BPM

## 2023-08-15 DIAGNOSIS — Z12.4 SCREENING FOR CERVICAL CANCER: ICD-10-CM

## 2023-08-15 DIAGNOSIS — R53.83 OTHER FATIGUE: ICD-10-CM

## 2023-08-15 DIAGNOSIS — Z00.00 ROUTINE GENERAL MEDICAL EXAMINATION AT A HEALTH CARE FACILITY: Primary | ICD-10-CM

## 2023-08-15 DIAGNOSIS — F33.1 MAJOR DEPRESSIVE DISORDER, RECURRENT EPISODE, MODERATE (H): ICD-10-CM

## 2023-08-15 DIAGNOSIS — Z11.59 NEED FOR HEPATITIS C SCREENING TEST: ICD-10-CM

## 2023-08-15 DIAGNOSIS — Z13.220 SCREENING FOR HYPERLIPIDEMIA: ICD-10-CM

## 2023-08-15 DIAGNOSIS — N64.4 BREAST PAIN, LEFT: ICD-10-CM

## 2023-08-15 DIAGNOSIS — F41.1 GAD (GENERALIZED ANXIETY DISORDER): ICD-10-CM

## 2023-08-15 LAB
CHOLEST SERPL-MCNC: 173 MG/DL
ERYTHROCYTE [DISTWIDTH] IN BLOOD BY AUTOMATED COUNT: 11.6 % (ref 10–15)
FERRITIN SERPL-MCNC: 23 NG/ML (ref 6–175)
HCT VFR BLD AUTO: 39.7 % (ref 35–47)
HDLC SERPL-MCNC: 51 MG/DL
HGB BLD-MCNC: 13.5 G/DL (ref 11.7–15.7)
LDLC SERPL CALC-MCNC: 107 MG/DL
MCH RBC QN AUTO: 30.8 PG (ref 26.5–33)
MCHC RBC AUTO-ENTMCNC: 34 G/DL (ref 31.5–36.5)
MCV RBC AUTO: 91 FL (ref 78–100)
NONHDLC SERPL-MCNC: 122 MG/DL
PLATELET # BLD AUTO: 184 10E3/UL (ref 150–450)
RBC # BLD AUTO: 4.38 10E6/UL (ref 3.8–5.2)
TRIGL SERPL-MCNC: 74 MG/DL
WBC # BLD AUTO: 4.4 10E3/UL (ref 4–11)

## 2023-08-15 PROCEDURE — 90715 TDAP VACCINE 7 YRS/> IM: CPT | Performed by: PHYSICIAN ASSISTANT

## 2023-08-15 PROCEDURE — 82728 ASSAY OF FERRITIN: CPT | Performed by: PHYSICIAN ASSISTANT

## 2023-08-15 PROCEDURE — 99396 PREV VISIT EST AGE 40-64: CPT | Mod: 25 | Performed by: PHYSICIAN ASSISTANT

## 2023-08-15 PROCEDURE — 87624 HPV HI-RISK TYP POOLED RSLT: CPT | Performed by: PHYSICIAN ASSISTANT

## 2023-08-15 PROCEDURE — 99214 OFFICE O/P EST MOD 30 MIN: CPT | Mod: 25 | Performed by: PHYSICIAN ASSISTANT

## 2023-08-15 PROCEDURE — 86803 HEPATITIS C AB TEST: CPT | Performed by: PHYSICIAN ASSISTANT

## 2023-08-15 PROCEDURE — G0145 SCR C/V CYTO,THINLAYER,RESCR: HCPCS | Performed by: PHYSICIAN ASSISTANT

## 2023-08-15 PROCEDURE — 85027 COMPLETE CBC AUTOMATED: CPT | Performed by: PHYSICIAN ASSISTANT

## 2023-08-15 PROCEDURE — 36415 COLL VENOUS BLD VENIPUNCTURE: CPT | Performed by: PHYSICIAN ASSISTANT

## 2023-08-15 PROCEDURE — 80061 LIPID PANEL: CPT | Performed by: PHYSICIAN ASSISTANT

## 2023-08-15 PROCEDURE — 90471 IMMUNIZATION ADMIN: CPT | Performed by: PHYSICIAN ASSISTANT

## 2023-08-15 RX ORDER — FLUOXETINE 10 MG/1
10 CAPSULE ORAL DAILY
Qty: 30 CAPSULE | Refills: 0 | Status: SHIPPED | OUTPATIENT
Start: 2023-08-15 | End: 2023-11-17

## 2023-08-15 RX ORDER — FLUOXETINE 10 MG/1
10 CAPSULE ORAL DAILY
Qty: 90 CAPSULE | Refills: 0 | Status: CANCELLED | OUTPATIENT
Start: 2023-08-15

## 2023-08-15 RX ORDER — ESCITALOPRAM OXALATE 5 MG/1
TABLET ORAL
Qty: 187 TABLET | Refills: 0 | Status: SHIPPED | OUTPATIENT
Start: 2023-08-15 | End: 2023-11-17

## 2023-08-15 RX ORDER — FLUOXETINE 10 MG/1
10 CAPSULE ORAL DAILY
Qty: 30 CAPSULE | Refills: 0 | Status: SHIPPED | OUTPATIENT
Start: 2023-08-15 | End: 2023-08-15

## 2023-08-15 ASSESSMENT — ENCOUNTER SYMPTOMS
SHORTNESS OF BREATH: 0
PALPITATIONS: 1
CHILLS: 0
PARESTHESIAS: 0
DIZZINESS: 1
NAUSEA: 1
HEARTBURN: 0
HEMATURIA: 0
CONSTIPATION: 0
COUGH: 0
ABDOMINAL PAIN: 0
NERVOUS/ANXIOUS: 1
HEMATOCHEZIA: 0
FREQUENCY: 1
SORE THROAT: 0
ARTHRALGIAS: 1
HEADACHES: 1
BREAST MASS: 0
WEAKNESS: 0
FEVER: 0

## 2023-08-15 ASSESSMENT — PATIENT HEALTH QUESTIONNAIRE - PHQ9
SUM OF ALL RESPONSES TO PHQ QUESTIONS 1-9: 7
SUM OF ALL RESPONSES TO PHQ QUESTIONS 1-9: 7
10. IF YOU CHECKED OFF ANY PROBLEMS, HOW DIFFICULT HAVE THESE PROBLEMS MADE IT FOR YOU TO DO YOUR WORK, TAKE CARE OF THINGS AT HOME, OR GET ALONG WITH OTHER PEOPLE: SOMEWHAT DIFFICULT

## 2023-08-15 NOTE — PATIENT INSTRUCTIONS
Decrease prozac to 10 mg for 5 days.   Then start lexapro at 5 mg for 7 days  Then start lexapro at 10 mg  Schedule appointment with me for end of Sept, Early Oct    Patient Education      Preventive Health Recommendations  Female Ages 40 to 49    Yearly exam:   See your health care provider every year in order to  Review health changes.   Discuss preventive care.    Review your medicines if your doctor prescribed any.    Get a Pap test every three years (unless you have an abnormal result and your provider advises testing more often).    If you get Pap tests with HPV test, you only need to test every 5 years, unless you have an abnormal result. You do not need a Pap test if your uterus was removed (hysterectomy) and you have not had cancer.    You should be tested each year for STDs (sexually transmitted diseases), if you're at risk.   Ask your doctor if you should have a mammogram.    Have a colonoscopy (test for colon cancer) if someone in your family has had colon cancer or polyps before age 50.     Have a cholesterol test every 5 years.     Have a diabetes test (fasting glucose) after age 45. If you are at risk for diabetes, you should have this test every 3 years.    Shots: Get a flu shot each year. Get a tetanus shot every 10 years.     Nutrition:   Eat at least 5 servings of fruits and vegetables each day.  Eat whole-grain bread, whole-wheat pasta and brown rice instead of white grains and rice.  Get adequate Calcium and Vitamin D.      Lifestyle  Exercise at least 150 minutes a week (an average of 30 minutes a day, 5 days a week). This will help you control your weight and prevent disease.  Limit alcohol to one drink per day.  No smoking.   Wear sunscreen to prevent skin cancer.  See your dentist every six months for an exam and cleaning.

## 2023-08-15 NOTE — PROGRESS NOTES
SUBJECTIVE:   CC: Brianna is an 43 year old who presents for preventive health visit.       8/15/2023     1:28 PM   Additional Questions   Roomed by delfin       Healthy Habits:     Getting at least 3 servings of Calcium per day:  Yes    Bi-annual eye exam:  Yes    Dental care twice a year:  Yes    Sleep apnea or symptoms of sleep apnea:  None    Diet:  Vegetarian/vegan    Frequency of exercise:  2-3 days/week    Duration of exercise:  15-30 minutes    Taking medications regularly:  Yes    Medication side effects:  Lightheadedness and Other    Additional concerns today:  Yes      Today's PHQ-9 Score:       8/15/2023     1:18 PM   PHQ-9 SCORE   PHQ-9 Total Score MyChart 7 (Mild depression)   PHQ-9 Total Score 7     Anxiety   Constant anxiety despite 30 mg prozac. At one point increased to 40 mg, but felt more numb to emotions.     Lt side breast pain  3 weeks, worse when 1st started then dulls out, tender to touch, no noticeable lumps, no drainage.  No family history of breast cancer.     Patient's last menstrual period was 07/15/2023 (within days).   A lot of back pain, cramping. Very heavy.     Mole on lt side- changing, no bleeding, darker and rough edges, changed in last 6 months    Works as a .         Have you ever done Advance Care Planning? (For example, a Health Directive, POLST, or a discussion with a medical provider or your loved ones about your wishes): No, advance care planning information given to patient to review.  Patient plans to discuss their wishes with loved ones or provider.      Social History     Tobacco Use    Smoking status: Never    Smokeless tobacco: Never   Substance Use Topics    Alcohol use: No             8/15/2023     1:21 PM   Alcohol Use   Prescreen: >3 drinks/day or >7 drinks/week? No     Reviewed orders with patient.  Reviewed health maintenance and updated orders accordingly - Yes  BP Readings from Last 3 Encounters:   08/15/23 93/57   03/09/22 119/78   12/16/21  108/73    Wt Readings from Last 3 Encounters:   08/15/23 59.1 kg (130 lb 6.4 oz)   22 60.1 kg (132 lb 6.4 oz)   21 58.1 kg (128 lb)                    Breast Cancer Screenin/16/2021     9:12 AM 2021     9:05 AM   Breast CA Risk Assessment (FHS-7)   Do you have a family history of breast, colon, or ovarian cancer? No / Unknown No / Unknown           Pertinent mammograms are reviewed under the imaging tab.    History of abnormal Pap smear: NO - age 30-65 PAP every 5 years with negative HPV co-testing recommended      Latest Ref Rng & Units 2019    11:35 AM 2019    11:24 AM 2016     9:41 AM   PAP / HPV   PAP (Historical)   NIL     HPV 16 DNA NEG^Negative Negative   Negative    HPV 18 DNA NEG^Negative Negative   Negative    Other HR HPV NEG^Negative Negative   Negative      Reviewed and updated as needed this visit by clinical staff   Tobacco  Allergies  Meds              Reviewed and updated as needed this visit by Provider                     Review of Systems   Constitutional:  Negative for chills and fever.   HENT:  Negative for congestion, ear pain and sore throat.    Eyes:  Negative for visual disturbance.   Respiratory:  Negative for cough and shortness of breath.    Cardiovascular:  Positive for chest pain and palpitations.   Gastrointestinal:  Positive for nausea. Negative for abdominal pain, constipation, heartburn and hematochezia.   Breasts:  Positive for tenderness. Negative for breast mass and discharge.   Genitourinary:  Positive for frequency. Negative for genital sores, hematuria, pelvic pain, urgency and vaginal bleeding.   Musculoskeletal:  Positive for arthralgias.   Skin:  Negative for rash.   Neurological:  Positive for dizziness and headaches. Negative for weakness and paresthesias.   Psychiatric/Behavioral:  The patient is nervous/anxious.           OBJECTIVE:   BP 93/57 (BP Location: Right arm, Patient Position: Sitting, Cuff Size: Adult Regular)   " Pulse 74   Ht 1.549 m (5' 1\")   Wt 59.1 kg (130 lb 6.4 oz)   LMP 07/15/2023 (Within Days)   SpO2 98%   BMI 24.64 kg/m    Physical Exam  GENERAL: healthy, alert and no distress  EYES: Eyes grossly normal to inspection, PERRL and conjunctivae and sclerae normal  HENT: ear canals and TM's normal, nose and mouth without ulcers or lesions  NECK: no adenopathy, no asymmetry, masses, or scars and thyroid normal to palpation  RESP: lungs clear to auscultation - no rales, rhonchi or wheezes  BREAST: normal without masses, tenderness or nipple discharge and no palpable axillary masses or adenopathy  CV: regular rate and rhythm, normal S1 S2, no S3 or S4, no murmur, click or rub, no peripheral edema and peripheral pulses strong  ABDOMEN: soft, nontender, no hepatosplenomegaly, no masses and bowel sounds normal   (female): normal female external genitalia, normal urethral meatus, vaginal mucosa pink, moist, well rugated, and normal cervix/adnexa/uterus without masses or discharge  MS: no gross musculoskeletal defects noted, no edema  SKIN: no suspicious lesions or rashes. Right flank 2 cm seborrheic keratosis.   NEURO: Normal strength and tone, mentation intact and speech normal  PSYCH: mentation appears normal, affect normal/bright    Diagnostic Test Results:  Labs reviewed in Epic    ASSESSMENT/PLAN:   1. Routine general medical examination at a health care facility  Well adult.     2. Need for hepatitis C screening test  Screen.  - Hepatitis C Screen Reflex to HCV RNA Quant and Genotype; Future  - Hepatitis C Screen Reflex to HCV RNA Quant and Genotype    3. Major depressive disorder, recurrent episode, moderate (H)  4. TONO (generalized anxiety disorder)  Suggest we switch from prozac to lexapro. See patient instructions for dose changes. Will follow up in 6 weeks with me to recheck symptoms. I discussed with the patient risks and benefits of the new medication prescribed including potential side effects.  The " patient had opportunity to ask questions and is comfortable with and interested in medications as prescribed.   - escitalopram (LEXAPRO) 5 MG tablet; Take 1 tablet (5 mg) by mouth daily for 7 days, THEN 2 tablets (10 mg) daily for 90 days.  Dispense: 187 tablet; Refill: 0    5. Breast pain, left  Eval breast symptoms.   - MA Diagnostic Digital Bilateral; Future  - US Breast Left Limited 1-3 Quadrants; Future    6. Other fatigue  Has felt more tired recently.  - CBC with platelets; Future  - Ferritin; Future  - CBC with platelets  - Ferritin    7. Screening for hyperlipidemia  Screen.  - Lipid panel reflex to direct LDL Fasting; Future  - Lipid panel reflex to direct LDL Fasting    8. Screening for cervical cancer  Screen.  - Pap screen with HPV - recommended age 30 - 65 years           COUNSELING:  Reviewed preventive health counseling, as reflected in patient instructions        She reports that she has never smoked. She has never used smokeless tobacco.          Mikki Gonzalez PA-C  [facility] submitted by the patient for this visit:  Patient Health Questionnaire (Submitted on 8/15/2023)  If you checked off any problems, how difficult have these problems made it for you to do your work, take care of things at home, or get along with other people?: Somewhat difficult  PHQ9 TOTAL SCORE: 7

## 2023-08-16 LAB — HCV AB SERPL QL IA: NONREACTIVE

## 2023-08-17 ENCOUNTER — TELEPHONE (OUTPATIENT)
Dept: FAMILY MEDICINE | Facility: CLINIC | Age: 43
End: 2023-08-17
Payer: COMMERCIAL

## 2023-08-17 DIAGNOSIS — F33.1 MAJOR DEPRESSIVE DISORDER, RECURRENT EPISODE, MODERATE (H): Primary | ICD-10-CM

## 2023-08-17 RX ORDER — ESCITALOPRAM OXALATE 5 MG/1
TABLET ORAL
Qty: 7 TABLET | Refills: 0 | Status: SHIPPED | OUTPATIENT
Start: 2023-08-17 | End: 2023-11-17

## 2023-08-17 RX ORDER — ESCITALOPRAM OXALATE 10 MG/1
10 TABLET ORAL DAILY
Qty: 90 TABLET | Refills: 0 | Status: SHIPPED | OUTPATIENT
Start: 2023-08-17 | End: 2023-11-17

## 2023-08-17 NOTE — TELEPHONE ENCOUNTER
Please send in escitalopram 5mg dosing by itself and the 10mg dosing by itself insurance doesn't cover more than 1 tablet daily.    [employer]  [phone]

## 2023-08-17 NOTE — TELEPHONE ENCOUNTER
Spoke with pharm. Sig has to be written differently for insurance or pt would have to wait for prior auth. Pharm asking for separate 5 mg and 10 mg.       Thanks,  ENRIQUE Maldonado  [facility]

## 2023-08-18 LAB
BKR LAB AP GYN ADEQUACY: NORMAL
BKR LAB AP GYN INTERPRETATION: NORMAL
BKR LAB AP HPV REFLEX: NORMAL
BKR LAB AP LMP: NORMAL
BKR LAB AP PREVIOUS ABNORMAL: NORMAL
PATH REPORT.COMMENTS IMP SPEC: NORMAL
PATH REPORT.COMMENTS IMP SPEC: NORMAL
PATH REPORT.RELEVANT HX SPEC: NORMAL

## 2023-08-22 LAB
HUMAN PAPILLOMA VIRUS 16 DNA: NEGATIVE
HUMAN PAPILLOMA VIRUS 18 DNA: NEGATIVE
HUMAN PAPILLOMA VIRUS FINAL DIAGNOSIS: NORMAL
HUMAN PAPILLOMA VIRUS OTHER HR: NEGATIVE

## 2023-08-31 ENCOUNTER — ANCILLARY PROCEDURE (OUTPATIENT)
Dept: MAMMOGRAPHY | Facility: CLINIC | Age: 43
End: 2023-08-31
Attending: PHYSICIAN ASSISTANT
Payer: COMMERCIAL

## 2023-08-31 ENCOUNTER — ANCILLARY PROCEDURE (OUTPATIENT)
Dept: ULTRASOUND IMAGING | Facility: CLINIC | Age: 43
End: 2023-08-31
Attending: PHYSICIAN ASSISTANT
Payer: COMMERCIAL

## 2023-08-31 DIAGNOSIS — N64.4 BREAST PAIN, LEFT: ICD-10-CM

## 2023-08-31 PROCEDURE — G0279 TOMOSYNTHESIS, MAMMO: HCPCS | Performed by: RADIOLOGY

## 2023-08-31 PROCEDURE — 76642 ULTRASOUND BREAST LIMITED: CPT | Mod: LT | Performed by: RADIOLOGY

## 2023-08-31 PROCEDURE — 77066 DX MAMMO INCL CAD BI: CPT | Performed by: RADIOLOGY

## 2023-10-26 ENCOUNTER — VIRTUAL VISIT (OUTPATIENT)
Dept: INTERNAL MEDICINE | Facility: CLINIC | Age: 43
End: 2023-10-26
Payer: COMMERCIAL

## 2023-10-26 DIAGNOSIS — R30.0 DIFFICULT OR PAINFUL URINATION: Primary | ICD-10-CM

## 2023-10-26 PROCEDURE — 99442 PR PHYSICIAN TELEPHONE EVALUATION 11-20 MIN: CPT | Mod: 93

## 2023-10-26 ASSESSMENT — ENCOUNTER SYMPTOMS: BACK PAIN: 1

## 2023-10-26 NOTE — PROGRESS NOTES
Brianna is a 43 year old who is being evaluated via a billable telephone visit.      What phone number would you like to be contacted at? [phone]  How would you like to obtain your AVS? Maria R    Distant Location (provider location):  On-site    (R30.0) Difficult or painful urination  (primary encounter diagnosis)  Comment: Patient is making complaints of painful urination describes as burning. Also states that they have left sided pain that at times will spread into the lower abdomen below the belly button. Denies diarrhea or constipation, does have history of kidney stones as well no blood is noted in urine. Discussed need to go to clinic to get labs to assess the next steps.   Plan: UA Macroscopic with reflex to Microscopic and         Culture, CBC with platelets and differential,         Comprehensive metabolic panel (BMP + Alb, Alk         Phos, ALT, AST, Total. Bili, TP)        Pending        Subjective   Brianna is a 43 year old, presenting for the following health issues:  Back Pain (Left lower back), Abdominal Pain, and Urinary Frequency      10/26/2023     4:23 PM   Additional Questions   Roomed by Arpita DIEZ       Back Pain          Concern -   Onset: 2 weeks   Description: Left lower back pain and abdominal pain, urinary frequency  Intensity: mild, moderate  Progression of Symptoms:  same  Accompanying Signs & Symptoms: no  Previous history of similar problem: kidney stones years ago  Precipitating factors:        Worsened by: being active, caffeine  Alleviating factors:        Improved by:   Therapies tried and outcome: ice, tylenol and ibuprofen         Review of Systems   Musculoskeletal:  Positive for back pain.            Objective           Vitals:  No vitals were obtained today due to virtual visit.    Physical Exam   healthy, alert, and no distress  PSYCH: Alert and oriented times 3; coherent speech, normal   rate and volume, able to articulate logical thoughts, able   to abstract reason, no  tangential thoughts, no hallucinations   or delusions  Her affect is normal  RESP: No cough, no audible wheezing, able to talk in full sentences  Remainder of exam unable to be completed due to telephone visits    No results found for any visits on 10/26/23.            Phone call duration: 15 minutes

## 2023-10-27 ENCOUNTER — LAB (OUTPATIENT)
Dept: LAB | Facility: CLINIC | Age: 43
End: 2023-10-27
Payer: COMMERCIAL

## 2023-10-27 DIAGNOSIS — R30.0 DIFFICULT OR PAINFUL URINATION: ICD-10-CM

## 2023-10-27 LAB
ALBUMIN UR-MCNC: NEGATIVE MG/DL
APPEARANCE UR: ABNORMAL
BACTERIA #/AREA URNS HPF: ABNORMAL /HPF
BASOPHILS # BLD AUTO: 0 10E3/UL (ref 0–0.2)
BASOPHILS NFR BLD AUTO: 1 %
BILIRUB UR QL STRIP: NEGATIVE
COLOR UR AUTO: YELLOW
EOSINOPHIL # BLD AUTO: 0.1 10E3/UL (ref 0–0.7)
EOSINOPHIL NFR BLD AUTO: 3 %
ERYTHROCYTE [DISTWIDTH] IN BLOOD BY AUTOMATED COUNT: 11.5 % (ref 10–15)
GLUCOSE UR STRIP-MCNC: NEGATIVE MG/DL
HCT VFR BLD AUTO: 39.3 % (ref 35–47)
HGB BLD-MCNC: 12.9 G/DL (ref 11.7–15.7)
HGB UR QL STRIP: ABNORMAL
IMM GRANULOCYTES # BLD: 0 10E3/UL
IMM GRANULOCYTES NFR BLD: 0 %
KETONES UR STRIP-MCNC: NEGATIVE MG/DL
LEUKOCYTE ESTERASE UR QL STRIP: NEGATIVE
LYMPHOCYTES # BLD AUTO: 1.6 10E3/UL (ref 0.8–5.3)
LYMPHOCYTES NFR BLD AUTO: 36 %
MCH RBC QN AUTO: 30.8 PG (ref 26.5–33)
MCHC RBC AUTO-ENTMCNC: 32.8 G/DL (ref 31.5–36.5)
MCV RBC AUTO: 94 FL (ref 78–100)
MONOCYTES # BLD AUTO: 0.4 10E3/UL (ref 0–1.3)
MONOCYTES NFR BLD AUTO: 8 %
MUCOUS THREADS #/AREA URNS LPF: PRESENT /LPF
NEUTROPHILS # BLD AUTO: 2.3 10E3/UL (ref 1.6–8.3)
NEUTROPHILS NFR BLD AUTO: 53 %
NITRATE UR QL: NEGATIVE
PH UR STRIP: 5.5 [PH] (ref 5–7)
PLATELET # BLD AUTO: 171 10E3/UL (ref 150–450)
RBC # BLD AUTO: 4.19 10E6/UL (ref 3.8–5.2)
RBC #/AREA URNS AUTO: ABNORMAL /HPF
SP GR UR STRIP: >=1.03 (ref 1–1.03)
SQUAMOUS #/AREA URNS AUTO: ABNORMAL /LPF
UROBILINOGEN UR STRIP-ACNC: 0.2 E.U./DL
WBC # BLD AUTO: 4.4 10E3/UL (ref 4–11)
WBC #/AREA URNS AUTO: ABNORMAL /HPF

## 2023-10-27 PROCEDURE — 85025 COMPLETE CBC W/AUTO DIFF WBC: CPT

## 2023-10-27 PROCEDURE — 80053 COMPREHEN METABOLIC PANEL: CPT

## 2023-10-27 PROCEDURE — 81001 URINALYSIS AUTO W/SCOPE: CPT

## 2023-10-27 PROCEDURE — 36415 COLL VENOUS BLD VENIPUNCTURE: CPT

## 2023-10-28 ENCOUNTER — NURSE TRIAGE (OUTPATIENT)
Dept: NURSING | Facility: CLINIC | Age: 43
End: 2023-10-28
Payer: COMMERCIAL

## 2023-10-28 LAB
ALBUMIN SERPL BCG-MCNC: 4.3 G/DL (ref 3.5–5.2)
ALP SERPL-CCNC: 57 U/L (ref 35–104)
ALT SERPL W P-5'-P-CCNC: 15 U/L (ref 0–50)
ANION GAP SERPL CALCULATED.3IONS-SCNC: 11 MMOL/L (ref 7–15)
AST SERPL W P-5'-P-CCNC: 17 U/L (ref 0–45)
BILIRUB SERPL-MCNC: 0.3 MG/DL
BUN SERPL-MCNC: 8.5 MG/DL (ref 6–20)
CALCIUM SERPL-MCNC: 9.1 MG/DL (ref 8.6–10)
CHLORIDE SERPL-SCNC: 102 MMOL/L (ref 98–107)
CREAT SERPL-MCNC: 0.75 MG/DL (ref 0.51–0.95)
DEPRECATED HCO3 PLAS-SCNC: 26 MMOL/L (ref 22–29)
EGFRCR SERPLBLD CKD-EPI 2021: >90 ML/MIN/1.73M2
GLUCOSE SERPL-MCNC: 118 MG/DL (ref 70–99)
POTASSIUM SERPL-SCNC: 3.3 MMOL/L (ref 3.4–5.3)
PROT SERPL-MCNC: 6.8 G/DL (ref 6.4–8.3)
SODIUM SERPL-SCNC: 139 MMOL/L (ref 135–145)

## 2023-10-28 NOTE — TELEPHONE ENCOUNTER
Nurse Triage SBAR    Is this a 2nd Level Triage? NO    Situation: Negative UA but ongoing symptoms.    Background: Patient had VV on 10/26 for urinary frequency and back pain. Lab orders were placed and she had them done yesterday. She saw her UA results today and called, asking when they would be reviewed by a provider because she is still having symptoms.    Assessment: Denies any fever. Continues to have left back pain rated 5/10, that comes and goes. Reports painful and frequent urination. UA negative for NI, LE, and WBC < 10). She does have trace blood in urine. Reports she is not menstruating at this time.    Protocol Recommended Disposition:   See PCP Within 24 Hours    Recommendation: Page sent to on-call provider for recommendation and review.     Paged to provider    On-call provider recommends going to  for evaluation d/t possible kidney stones.     Provider Recommendation Follow Up:   Reached patient/caregiver. Informed of provider's recommendations. Patient verbalized understanding and agrees with the plan.         Does the patient meet one of the following criteria for ADS visit consideration? 16+ years old, with an [facility] PCP     TIP  Providers, please consider if this condition is appropriate for management at one of our Acute and Diagnostic Services sites.     If patient is a good candidate, please use dotphrase <dot>triageresponse and select Refer to ADS to document.      Reason for Disposition   [1] NEGATIVE urine test (i.e., NI - and LE - and WBC < 10) AND [2] urine symptoms continue or are  worsening    Additional Information   Negative: [1] Diagnosed urinary tract infection AND [2] female taking antibiotic   Negative: [1] Diagnosed urinary tract infection AND [2] male taking antibiotic   Negative: Patient sounds very sick or weak to the triager   Negative: [1] POSITIVE urine test (i.e., NI + or LE+ or WBC > 10) AND [2] fever > 100.4 F (38.0 C)   Negative: [1] POSITIVE urine test AND [2] side  (flank) or lower back pain present   Negative: [1] POSITIVE urine test AND [2] pregnant    Protocols used: Urinalysis Results Follow-up Call-A-AH

## 2023-10-31 DIAGNOSIS — N39.0 URINARY TRACT INFECTION WITHOUT HEMATURIA, SITE UNSPECIFIED: Primary | ICD-10-CM

## 2023-10-31 RX ORDER — NITROFURANTOIN 25; 75 MG/1; MG/1
100 CAPSULE ORAL 2 TIMES DAILY
Qty: 10 CAPSULE | Refills: 0 | Status: SHIPPED | OUTPATIENT
Start: 2023-10-31 | End: 2023-12-07

## 2023-11-10 ENCOUNTER — MYC REFILL (OUTPATIENT)
Dept: FAMILY MEDICINE | Facility: CLINIC | Age: 43
End: 2023-11-10
Payer: COMMERCIAL

## 2023-11-10 DIAGNOSIS — F33.1 MAJOR DEPRESSIVE DISORDER, RECURRENT EPISODE, MODERATE (H): ICD-10-CM

## 2023-11-10 DIAGNOSIS — F41.1 GAD (GENERALIZED ANXIETY DISORDER): ICD-10-CM

## 2023-11-13 RX ORDER — FLUOXETINE 10 MG/1
10 CAPSULE ORAL DAILY
Qty: 30 CAPSULE | Refills: 0 | OUTPATIENT
Start: 2023-11-13

## 2023-11-13 NOTE — TELEPHONE ENCOUNTER
Left message informing pt to schedule an appt for medication change.   Virtual ok.  Jakub Roldan MA on 11/13/2023 at 5:48 PM

## 2023-11-17 DIAGNOSIS — F33.1 MAJOR DEPRESSIVE DISORDER, RECURRENT EPISODE, MODERATE (H): ICD-10-CM

## 2023-11-17 RX ORDER — ESCITALOPRAM OXALATE 10 MG/1
10 TABLET ORAL DAILY
Qty: 90 TABLET | Refills: 0 | Status: SHIPPED | OUTPATIENT
Start: 2023-11-17 | End: 2023-12-07

## 2023-12-07 ENCOUNTER — VIRTUAL VISIT (OUTPATIENT)
Dept: FAMILY MEDICINE | Facility: CLINIC | Age: 43
End: 2023-12-07
Payer: COMMERCIAL

## 2023-12-07 DIAGNOSIS — F41.1 GAD (GENERALIZED ANXIETY DISORDER): Primary | ICD-10-CM

## 2023-12-07 PROCEDURE — 99442 PR PHYSICIAN TELEPHONE EVALUATION 11-20 MIN: CPT | Mod: 93 | Performed by: PHYSICIAN ASSISTANT

## 2023-12-07 RX ORDER — HYDROXYZINE HYDROCHLORIDE 25 MG/1
25 TABLET, FILM COATED ORAL 3 TIMES DAILY PRN
Qty: 60 TABLET | Refills: 2 | Status: SHIPPED | OUTPATIENT
Start: 2023-12-07

## 2023-12-07 ASSESSMENT — PATIENT HEALTH QUESTIONNAIRE - PHQ9: 5. POOR APPETITE OR OVEREATING: NEARLY EVERY DAY

## 2023-12-07 ASSESSMENT — ANXIETY QUESTIONNAIRES
3. WORRYING TOO MUCH ABOUT DIFFERENT THINGS: NEARLY EVERY DAY
2. NOT BEING ABLE TO STOP OR CONTROL WORRYING: NEARLY EVERY DAY
6. BECOMING EASILY ANNOYED OR IRRITABLE: NEARLY EVERY DAY
1. FEELING NERVOUS, ANXIOUS, OR ON EDGE: NEARLY EVERY DAY
5. BEING SO RESTLESS THAT IT IS HARD TO SIT STILL: SEVERAL DAYS
GAD7 TOTAL SCORE: 17
IF YOU CHECKED OFF ANY PROBLEMS ON THIS QUESTIONNAIRE, HOW DIFFICULT HAVE THESE PROBLEMS MADE IT FOR YOU TO DO YOUR WORK, TAKE CARE OF THINGS AT HOME, OR GET ALONG WITH OTHER PEOPLE: EXTREMELY DIFFICULT
GAD7 TOTAL SCORE: 17
7. FEELING AFRAID AS IF SOMETHING AWFUL MIGHT HAPPEN: SEVERAL DAYS

## 2023-12-07 NOTE — PROGRESS NOTES
Brianna is a 43 year old who is being evaluated via a billable telephone visit.      What phone number would you like to be contacted at? [phone]   How would you like to obtain your AVS? Maria R    Distant Location (provider location):  On-site    Assessment & Plan     TONO (generalized anxiety disorder)  Will return to previous medication Prozac. Start at 20 mg. Touch base in 6-8 weeks if wanting to increase to 30 mg (as previous). Can also try Hydroxyzine. New medication to patient. I discussed with the patient risks and benefits of the new medication prescribed including potential side effects.  The patient had opportunity to ask questions and is comfortable with and interested in medications as prescribed. Follow up in 6 months.   - FLUoxetine (PROZAC) 20 MG capsule; Take 1 capsule (20 mg) by mouth daily  - hydrOXYzine HCl (ATARAX) 25 MG tablet; Take 1 tablet (25 mg) by mouth 3 times daily as needed for anxiety                 Mikki Gonzalez PA-C  [facility]   Brianna is a 43 year old, presenting for the following health issues:  Recheck Medication      12/7/2023     3:02 PM   Additional Questions   Roomed by Charlotte   Accompanied by self       HPI     Depression and Anxiety Follow-Up stopped taking Lexapro 2 weeks ago   How are you doing with your depression since your last visit? No change  How are you doing with your anxiety since your last visit?  Worsened   Are you having other symptoms that might be associated with depression or anxiety? No  Have you had a significant life event? No   Do you have any concerns with your use of alcohol or other drugs? No      Lexapro was really hard on body. Very achy, nauseous, very tired.   Switched from prozac 08/2023 because she had constant anxiety (was on 30 mg).           Social History     Tobacco Use    Smoking status: Never    Smokeless tobacco: Never   Vaping Use    Vaping Use: Never used   Substance Use Topics    Alcohol use:  No    Drug use: No         1/13/2022     8:24 AM 8/15/2023     1:18 PM 12/7/2023     2:58 PM   PHQ   PHQ-9 Total Score 8 7    Q9: Thoughts of better off dead/self-harm past 2 weeks Not at all Not at all Not at all         11/16/2021     5:00 PM 1/13/2022     8:24 AM 12/7/2023     2:58 PM   TONO-7 SCORE   Total Score 14 8 17         12/7/2023     2:58 PM   Last PHQ-9   1.  Little interest or pleasure in doing things 1   2.  Feeling down, depressed, or hopeless 1   3.  Trouble falling or staying asleep, or sleeping too much 1   5.  Poor appetite or overeating 1   6.  Feeling bad about yourself 3   7.  Trouble concentrating 3   8.  Moving slowly or restless 0   Q9: Thoughts of better off dead/self-harm past 2 weeks 0   Difficulty at work, home, or with people Somewhat difficult         12/7/2023     2:58 PM   TONO-7    1. Feeling nervous, anxious, or on edge 3   2. Not being able to stop or control worrying 3   3. Worrying too much about different things 3   4. Trouble relaxing 3   5. Being so restless that it is hard to sit still 1   6. Becoming easily annoyed or irritable 3   7. Feeling afraid, as if something awful might happen 1   TONO-7 Total Score 17   If you checked any problems, how difficult have they made it for you to do your work, take care of things at home, or get along with other people? Extremely difficult       Suicide Assessment Five-step Evaluation and Treatment (SAFE-T)    How many servings of fruits and vegetables do you eat daily?  2-3  On average, how many sweetened beverages do you drink each day (Examples: soda, juice, sweet tea, etc.  Do NOT count diet or artificially sweetened beverages)?   1-2   How many days per week do you exercise enough to make your heart beat faster? 7 days walks   How many minutes a day do you exercise enough to make your heart beat faster? 10 - 19  How many days per week do you miss taking your medication? 0        Review of Systems   Constitutional, HEENT,  cardiovascular, pulmonary, gi and gu systems are negative, except as otherwise noted.      Objective           Vitals:  No vitals were obtained today due to virtual visit.    Physical Exam   healthy, alert, and no distress  PSYCH: Alert and oriented times 3; coherent speech, normal   rate and volume, able to articulate logical thoughts, able   to abstract reason, no tangential thoughts, no hallucinations   or delusions  Her affect is normal  RESP: No cough, no audible wheezing, able to talk in full sentences  Remainder of exam unable to be completed due to telephone visits                Phone call duration: 12 minutes

## 2023-12-07 NOTE — PROGRESS NOTES
Brianna is a 43 year old who is being evaluated via a billable video visit.      How would you like to obtain your AVS? MyChart  If the video visit is dropped, the invitation should be resent by: Text to cell phone: [phone]  Will anyone else be joining your video visit? No  {If patient encounters technical issues they should call [phone] :798068}        {PROVIDER CHARTING PREFERENCE:686955}    Subjective   Brianna is a 43 year old, presenting for the following health issues:  Recheck Medication        12/7/2023     3:02 PM   Additional Questions   Roomed by Charlotte   Accompanied by self       HPI     Depression and Anxiety Follow-Up stopped taking Lexapro 2 weeks ago   How are you doing with your depression since your last visit? No change  How are you doing with your anxiety since your last visit?  Worsened   Are you having other symptoms that might be associated with depression or anxiety? No  Have you had a significant life event? No   Do you have any concerns with your use of alcohol or other drugs? No    Social History     Tobacco Use    Smoking status: Never    Smokeless tobacco: Never   Vaping Use    Vaping Use: Never used   Substance Use Topics    Alcohol use: No    Drug use: No         1/13/2022     8:24 AM 8/15/2023     1:18 PM 12/7/2023     2:58 PM   PHQ   PHQ-9 Total Score 8 7    Q9: Thoughts of better off dead/self-harm past 2 weeks Not at all Not at all Not at all         11/16/2021     5:00 PM 1/13/2022     8:24 AM 12/7/2023     2:58 PM   TONO-7 SCORE   Total Score 14 8 17         12/7/2023     2:58 PM   Last PHQ-9   1.  Little interest or pleasure in doing things 1   2.  Feeling down, depressed, or hopeless 1   3.  Trouble falling or staying asleep, or sleeping too much 1   5.  Poor appetite or overeating 1   6.  Feeling bad about yourself 3   7.  Trouble concentrating 3   8.  Moving slowly or restless 0   Q9: Thoughts of better off dead/self-harm past 2 weeks 0   Difficulty at work, home, or with  "people Somewhat difficult         12/7/2023     2:58 PM   TONO-7    1. Feeling nervous, anxious, or on edge 3   2. Not being able to stop or control worrying 3   3. Worrying too much about different things 3   4. Trouble relaxing 3   5. Being so restless that it is hard to sit still 1   6. Becoming easily annoyed or irritable 3   7. Feeling afraid, as if something awful might happen 1   TONO-7 Total Score 17   If you checked any problems, how difficult have they made it for you to do your work, take care of things at home, or get along with other people? Extremely difficult       Suicide Assessment Five-step Evaluation and Treatment (SAFE-T)  {Provider  Link to Depression Care Package SmartSet :553650}  How many servings of fruits and vegetables do you eat daily?  2-3  On average, how many sweetened beverages do you drink each day (Examples: soda, juice, sweet tea, etc.  Do NOT count diet or artificially sweetened beverages)?   1-2   How many days per week do you exercise enough to make your heart beat faster? 7 walks   How many minutes a day do you exercise enough to make your heart beat faster? 10 - 19  How many days per week do you miss taking your medication? 0  {additonal problems for provider to add (Optional):097938}      Review of Systems   {ROS COMP (Optional):009463}      Objective           Vitals:  No vitals were obtained today due to virtual visit.    Physical Exam   {video visit exam brief selected:292946}    {Diagnostic Test Results (Optional):973346}    {AMBULATORY ATTESTATION (Optional):511028}        Video-Visit Details    Type of service:  Video Visit   Video Start Time: {video visit start/end time for provider to select:541649}  Video End Time:{video visit start/end time for provider to select:590529}    Originating Location (pt. Location): {video visit patient location:819115::\"Home\"}  {PROVIDER LOCATION On-site should be selected for visits conducted from your clinic location or adjoining [facility] " "hospital, academic office, or other nearby [facility] building. Off-site should be selected for all other provider locations, including home:938395}  Distant Location (provider location):  {virtual location provider:611299}  Platform used for Video Visit: {Virtual Visit Platforms:456692::\"Dobango\"}      "

## 2024-04-04 ENCOUNTER — ANCILLARY PROCEDURE (OUTPATIENT)
Dept: GENERAL RADIOLOGY | Facility: CLINIC | Age: 44
End: 2024-04-04
Attending: PHYSICIAN ASSISTANT
Payer: COMMERCIAL

## 2024-04-04 ENCOUNTER — OFFICE VISIT (OUTPATIENT)
Dept: FAMILY MEDICINE | Facility: CLINIC | Age: 44
End: 2024-04-04
Payer: COMMERCIAL

## 2024-04-04 VITALS
HEIGHT: 61 IN | HEART RATE: 81 BPM | WEIGHT: 122 LBS | SYSTOLIC BLOOD PRESSURE: 104 MMHG | TEMPERATURE: 97.2 F | OXYGEN SATURATION: 94 % | RESPIRATION RATE: 16 BRPM | BODY MASS INDEX: 23.03 KG/M2 | DIASTOLIC BLOOD PRESSURE: 70 MMHG

## 2024-04-04 DIAGNOSIS — M54.42 ACUTE MIDLINE LOW BACK PAIN WITH LEFT-SIDED SCIATICA: Primary | ICD-10-CM

## 2024-04-04 DIAGNOSIS — M25.562 ACUTE PAIN OF LEFT KNEE: ICD-10-CM

## 2024-04-04 PROCEDURE — 73560 X-RAY EXAM OF KNEE 1 OR 2: CPT | Mod: TC | Performed by: RADIOLOGY

## 2024-04-04 PROCEDURE — 72100 X-RAY EXAM L-S SPINE 2/3 VWS: CPT | Mod: TC | Performed by: INTERNAL MEDICINE

## 2024-04-04 PROCEDURE — 99213 OFFICE O/P EST LOW 20 MIN: CPT | Performed by: PHYSICIAN ASSISTANT

## 2024-04-04 RX ORDER — TIZANIDINE 2 MG/1
2 TABLET ORAL
Qty: 14 TABLET | Refills: 0 | Status: SHIPPED | OUTPATIENT
Start: 2024-04-04 | End: 2024-09-12

## 2024-04-04 RX ORDER — MELOXICAM 15 MG/1
15 TABLET ORAL DAILY
Qty: 14 TABLET | Refills: 0 | Status: SHIPPED | OUTPATIENT
Start: 2024-04-04

## 2024-04-04 ASSESSMENT — PATIENT HEALTH QUESTIONNAIRE - PHQ9
SUM OF ALL RESPONSES TO PHQ QUESTIONS 1-9: 7
10. IF YOU CHECKED OFF ANY PROBLEMS, HOW DIFFICULT HAVE THESE PROBLEMS MADE IT FOR YOU TO DO YOUR WORK, TAKE CARE OF THINGS AT HOME, OR GET ALONG WITH OTHER PEOPLE: NOT DIFFICULT AT ALL
SUM OF ALL RESPONSES TO PHQ QUESTIONS 1-9: 7

## 2024-04-04 ASSESSMENT — ENCOUNTER SYMPTOMS
BACK PAIN: 1
NUMBNESS: 1

## 2024-04-04 ASSESSMENT — PAIN SCALES - GENERAL: PAINLEVEL: MODERATE PAIN (5)

## 2024-04-04 NOTE — PROGRESS NOTES
Assessment & Plan     Acute midline low back pain with left-sided sciatica  Given daily NSAID and muscle relaxant to see if helpful. Recommend TENS unit. Consider PT if not improving.   - XR Lumbar Spine 2/3 Views  - meloxicam (MOBIC) 15 MG tablet; Take 1 tablet (15 mg) by mouth daily  - tiZANidine (ZANAFLEX) 2 MG tablet; Take 1 tablet (2 mg) by mouth nightly as needed for muscle spasms  - omeprazole (PRILOSEC) 20 MG DR capsule; Take 1 capsule (20 mg) by mouth daily    Acute pain of left knee  Conisder referral to Ortho is needed. Can try TENS unit on knee as well as back.   - XR Knee Standing Left 2 Views; Future  - meloxicam (MOBIC) 15 MG tablet; Take 1 tablet (15 mg) by mouth daily  - tiZANidine (ZANAFLEX) 2 MG tablet; Take 1 tablet (2 mg) by mouth nightly as needed for muscle spasms  - omeprazole (PRILOSEC) 20 MG DR capsule; Take 1 capsule (20 mg) by mouth daily    Follow up left open ended. Consider PT referral if not improving or MRI and Pain Management referral if red flag symptoms develop.      Subjective   Brianna is a 43 year old, presenting for the following health issues:  Back Pain and Numbness (Thigh -left side )        4/4/2024    11:21 AM   Additional Questions   Roomed by Marion Calvillo     Brianna is a pleasant 43 year old female who presents to clinic for evaluation of back and leg pain with intermittent numbness and tingling of the left leg for quite some time. She states she was having some body aches and joint aches during the winter. She tried to go to yoga to see if that would help the issue. Then, she began feeling pain in the back of the left knee and calf. She thinks all these pains are connected and does not know where they are coming from. Left feels more swollen under knee and the pain has been annoying. Stopped yoga bc it was not helpful and did not want to injure herself. Walks dog daily. Works on feet standing all day. Numbness and tingling are still there, waxes and wanes. Sometiems has  "to move toes around to feel the foot. No injuries or falls. When first started, was doing yoga and that was new for this winter. Onset 1st or 2nd week in February.  Currently pain has worsened, but has had spurts where it was dull and achy. Feels worse bending over to pick something up or sitting in a stationary position for a long period of time. She tends to toss and turn in her sleep anyway so not sure if that produces more pain or not. Feels like body has been more noticeable sore in general. She had a massage 10 days ago and her masseuse told her she should be seen. Brianna had arthralgias and myalgias in 2019 with a completely negative autoimmune work up. She takes ibuprofen and Tylenol. Has used IcyHot Lidocaine patches and will apply ice intermittently as well. Has heat packs too. No saddle anesthesia. No bladder or bowel incontinence. No foot drop.     LEATHA has RA and sister dxd with celiac and brother diabetic (type 1).      History of Present Illness       Back Pain:  She presents for follow up of back pain. Patient's back pain is a recurring problem.  Location of back pain:  Left lower back  Description of back pain: dull ache  Back pain spreads: left thigh and left knee    Since patient first noticed back pain, pain is: unchanged  Does back pain interfere with her job:  Yes       Reason for visit:  Leg pain swelling behind knee tingling numbness left leg  Symptom onset:  3-4 weeks ago    She eats 0-1 servings of fruits and vegetables daily.She consumes 1 sweetened beverage(s) daily.She exercises with enough effort to increase her heart rate 20 to 29 minutes per day.  She exercises with enough effort to increase her heart rate 7 days per week. She is missing 1 dose(s) of medications per week.       Review of Systems  As per HPI  No fevers, chills, recent illnesses, tick bites.       Objective    /70   Pulse 81   Temp 97.2  F (36.2  C) (Tympanic)   Resp 16   Ht 1.549 m (5' 1\")   Wt 55.3 kg (122 lb) " "  SpO2 94%   BMI 23.05 kg/m    Body mass index is 23.05 kg/m .  Physical Exam   Constitutional: normal appearance  Resp: even and unlabored breathing  MS: ambulatory without assistive device. No foot drop noted. SLR negative bilaterally. With left knee and hip flexion, \"pulls at SI area.\" TTP over left paraspinous lumbar region. No palpable left knee abnormalities. No left knee laxity. No left knee effusion. Strength intact, but limited by pain against resistance. Plantar and dorsiflexion normal.  Psych: normal behavior, cognition, speech and mood    Results for orders placed or performed in visit on 04/04/24   XR Knee Standing Left 2 Views     Status: None    Narrative    KNEE STANDING LEFT 2 VIEWS   4/4/2024 12:09 PM     HISTORY: Acute pain of left knee.    COMPARISON: None.      Impression    IMPRESSION: Normal bones, joint spaces and alignment. No fracture,  effusion or calcified intra-articular body.    RYAN HAIDER MD         SYSTEM ID:  [id number]   Results for orders placed or performed in visit on 04/04/24   XR Lumbar Spine 2/3 Views     Status: None    Narrative    XR LUMBAR SPINE 2/3 VIEWS  4/4/2024 12:09 PM     HISTORY: Acute midline low back pain with left-sided sciatica    COMPARISON: None.       Impression    IMPRESSION: Alignment of the lumbar spine is within normal limits. No  loss of vertebral body height. No significant degenerative endplate  changes or loss of intervertebral disc space.     LAURYN DOMINGUEZ MD         SYSTEM ID:  [id number]       Signed Electronically by: KELLI LUCIANO PA-C    "

## 2024-07-05 DIAGNOSIS — F41.1 GAD (GENERALIZED ANXIETY DISORDER): ICD-10-CM

## 2024-07-16 ENCOUNTER — PATIENT OUTREACH (OUTPATIENT)
Dept: CARE COORDINATION | Facility: CLINIC | Age: 44
End: 2024-07-16
Payer: COMMERCIAL

## 2024-07-30 ENCOUNTER — PATIENT OUTREACH (OUTPATIENT)
Dept: CARE COORDINATION | Facility: CLINIC | Age: 44
End: 2024-07-30
Payer: COMMERCIAL

## 2024-09-12 ENCOUNTER — OFFICE VISIT (OUTPATIENT)
Dept: FAMILY MEDICINE | Facility: CLINIC | Age: 44
End: 2024-09-12
Payer: COMMERCIAL

## 2024-09-12 VITALS
WEIGHT: 123.8 LBS | DIASTOLIC BLOOD PRESSURE: 80 MMHG | OXYGEN SATURATION: 97 % | HEART RATE: 57 BPM | BODY MASS INDEX: 23.37 KG/M2 | TEMPERATURE: 98.1 F | RESPIRATION RATE: 20 BRPM | HEIGHT: 61 IN | SYSTOLIC BLOOD PRESSURE: 117 MMHG

## 2024-09-12 DIAGNOSIS — Z83.3 FAMILY HISTORY OF DIABETES MELLITUS: ICD-10-CM

## 2024-09-12 DIAGNOSIS — F33.1 MAJOR DEPRESSIVE DISORDER, RECURRENT EPISODE, MODERATE (H): ICD-10-CM

## 2024-09-12 DIAGNOSIS — D25.2 INTRAMURAL AND SUBSEROUS LEIOMYOMA OF UTERUS: ICD-10-CM

## 2024-09-12 DIAGNOSIS — N92.1 METRORRHAGIA: ICD-10-CM

## 2024-09-12 DIAGNOSIS — D25.1 INTRAMURAL AND SUBSEROUS LEIOMYOMA OF UTERUS: ICD-10-CM

## 2024-09-12 DIAGNOSIS — F41.1 GAD (GENERALIZED ANXIETY DISORDER): ICD-10-CM

## 2024-09-12 DIAGNOSIS — Z00.00 ROUTINE GENERAL MEDICAL EXAMINATION AT A HEALTH CARE FACILITY: Primary | ICD-10-CM

## 2024-09-12 DIAGNOSIS — R10.12 LUQ ABDOMINAL PAIN: ICD-10-CM

## 2024-09-12 DIAGNOSIS — Z30.011 ENCOUNTER FOR INITIAL PRESCRIPTION OF CONTRACEPTIVE PILLS: ICD-10-CM

## 2024-09-12 LAB
ERYTHROCYTE [DISTWIDTH] IN BLOOD BY AUTOMATED COUNT: 12 % (ref 10–15)
HBA1C MFR BLD: 5.2 % (ref 0–5.6)
HCT VFR BLD AUTO: 36.9 % (ref 35–47)
HGB BLD-MCNC: 12.4 G/DL (ref 11.7–15.7)
MCH RBC QN AUTO: 30.7 PG (ref 26.5–33)
MCHC RBC AUTO-ENTMCNC: 33.6 G/DL (ref 31.5–36.5)
MCV RBC AUTO: 91 FL (ref 78–100)
PLATELET # BLD AUTO: 180 10E3/UL (ref 150–450)
RBC # BLD AUTO: 4.04 10E6/UL (ref 3.8–5.2)
WBC # BLD AUTO: 3.8 10E3/UL (ref 4–11)

## 2024-09-12 PROCEDURE — 99396 PREV VISIT EST AGE 40-64: CPT | Mod: 25 | Performed by: PHYSICIAN ASSISTANT

## 2024-09-12 PROCEDURE — 90656 IIV3 VACC NO PRSV 0.5 ML IM: CPT | Performed by: PHYSICIAN ASSISTANT

## 2024-09-12 PROCEDURE — 36415 COLL VENOUS BLD VENIPUNCTURE: CPT | Performed by: PHYSICIAN ASSISTANT

## 2024-09-12 PROCEDURE — 80053 COMPREHEN METABOLIC PANEL: CPT | Performed by: PHYSICIAN ASSISTANT

## 2024-09-12 PROCEDURE — 99214 OFFICE O/P EST MOD 30 MIN: CPT | Mod: 25 | Performed by: PHYSICIAN ASSISTANT

## 2024-09-12 PROCEDURE — 82728 ASSAY OF FERRITIN: CPT | Performed by: PHYSICIAN ASSISTANT

## 2024-09-12 PROCEDURE — 90471 IMMUNIZATION ADMIN: CPT | Performed by: PHYSICIAN ASSISTANT

## 2024-09-12 PROCEDURE — 85027 COMPLETE CBC AUTOMATED: CPT | Performed by: PHYSICIAN ASSISTANT

## 2024-09-12 PROCEDURE — 83036 HEMOGLOBIN GLYCOSYLATED A1C: CPT | Performed by: PHYSICIAN ASSISTANT

## 2024-09-12 RX ORDER — DROSPIRENONE AND ETHINYL ESTRADIOL 0.02-3(28)
1 KIT ORAL DAILY
Qty: 84 TABLET | Refills: 1 | Status: SHIPPED | OUTPATIENT
Start: 2024-09-12

## 2024-09-12 ASSESSMENT — PATIENT HEALTH QUESTIONNAIRE - PHQ9
SUM OF ALL RESPONSES TO PHQ QUESTIONS 1-9: 10
SUM OF ALL RESPONSES TO PHQ QUESTIONS 1-9: 10
10. IF YOU CHECKED OFF ANY PROBLEMS, HOW DIFFICULT HAVE THESE PROBLEMS MADE IT FOR YOU TO DO YOUR WORK, TAKE CARE OF THINGS AT HOME, OR GET ALONG WITH OTHER PEOPLE: SOMEWHAT DIFFICULT

## 2024-09-12 ASSESSMENT — ANXIETY QUESTIONNAIRES
GAD7 TOTAL SCORE: 7
8. IF YOU CHECKED OFF ANY PROBLEMS, HOW DIFFICULT HAVE THESE MADE IT FOR YOU TO DO YOUR WORK, TAKE CARE OF THINGS AT HOME, OR GET ALONG WITH OTHER PEOPLE?: SOMEWHAT DIFFICULT
7. FEELING AFRAID AS IF SOMETHING AWFUL MIGHT HAPPEN: SEVERAL DAYS
GAD7 TOTAL SCORE: 7
GAD7 TOTAL SCORE: 7

## 2024-09-12 NOTE — PROGRESS NOTES
Preventive Care Visit  [facility] CHUCK Gonzalez PA-C, Family Medicine  Sep 12, 2024      Assessment & Plan     Routine general medical examination at a health care facility  Well adult.     TONO (generalized anxiety disorder)  Major depressive disorder, recurrent episode moderate (H)  Refilled. Stable.   - FLUoxetine (PROZAC) 20 MG capsule; Take 1 capsule (20 mg) by mouth daily.    Metrorrhagia  Suggest trial of OCP to see if heavy periods improve. TVUS to rule out other concerns.   - drospirenone-ethinyl estradiol (CAMI) 3-0.02 MG tablet; Take 1 tablet by mouth daily.  - US Pelvic Complete with Transvaginal; Future  - CBC with platelets; Future  - Ferritin; Future  - CBC with platelets  - Ferritin    Encounter for initial prescription of contraceptive pills  As above.   - drospirenone-ethinyl estradiol (CAMI) 3-0.02 MG tablet; Take 1 tablet by mouth daily.    Family history of diabetes mellitus  Screen.  - Hemoglobin A1c; Future  - Hemoglobin A1c    LUQ abdominal pain  Discussed. Encouraged to keep journal of symptoms and recent foods when having symtpoms. . Try TUMS, pepcid, pepto. Return if not improving.   - CBC with platelets; Future  - Comprehensive metabolic panel (BMP + Alb, Alk Phos, ALT, AST, Total. Bili, TP); Future  - CBC with platelets  - Comprehensive metabolic panel (BMP + Alb, Alk Phos, ALT, AST, Total. Bili, TP)            Counseling  Appropriate preventive services were addressed with this patient via screening, questionnaire, or discussion as appropriate for fall prevention, nutrition, physical activity, Tobacco-use cessation, social engagement, weight loss and cognition.  Checklist reviewing preventive services available has been given to the patient.  Reviewed patient's diet, addressing concerns and/or questions.   The patient's PHQ-9 score is consistent with moderate depression. She was provided with information regarding depression.           Subjective   Brianna is a 44  year old, presenting for the following:  Physical        9/12/2024     1:29 PM   Additional Questions   Roomed by An V.         9/12/2024     1:29 PM   Patient Reported Additional Medications   Patient reports taking the following new medications none        Health Care Directive  Patient does not have a Health Care Directive or Living Will: Discussed advance care planning with patient; information given to patient to review.    HPI    Irregular periods.   Periods seem much heavier than normal. Seem to be worsening. Taking ibuprofen, heat pack.       Anxiety -  Feels prozac keeps her grounded, stable. Still has symptoms but doing ok.        1/13/2022     8:24 AM 12/7/2023     2:58 PM 9/12/2024    10:38 AM   TONO-7 SCORE   Total Score   7 (mild anxiety)   Total Score 8 17 7                     9/12/2024   General Health   How would you rate your overall physical health? (!) FAIR   Feel stress (tense, anxious, or unable to sleep) To some extent      (!) STRESS CONCERN      9/12/2024   Nutrition   Three or more servings of calcium each day? Yes   Diet: Vegetarian/vegan   How many servings of fruit and vegetables per day? (!) 0-1   How many sweetened beverages each day? 0-1            9/12/2024   Exercise   Days per week of moderate/strenous exercise 7 days   Average minutes spent exercising at this level 20 min            9/12/2024   Social Factors   Frequency of gathering with friends or relatives Once a week   Worry food won't last until get money to buy more No   Food not last or not have enough money for food? No   Do you have housing? (Housing is defined as stable permanent housing and does not include staying ouside in a car, in a tent, in an abandoned building, in an overnight shelter, or couch-surfing.) Yes   Are you worried about losing your housing? No   Lack of transportation? No   Unable to get utilities (heat,electricity)? No            9/12/2024   Dental   Dentist two times every year? Yes             9/12/2024   TB Screening   Were you born outside of the [place]? No          Today's PHQ-9 Score:       9/12/2024    10:36 AM   PHQ-9 SCORE   PHQ-9 Total Score MyChart 10 (Moderate depression)   PHQ-9 Total Score 10         9/12/2024   Substance Use   Alcohol more than 3/day or more than 7/wk No   Do you use any other substances recreationally? No        Social History     Tobacco Use    Smoking status: Never    Smokeless tobacco: Never   Vaping Use    Vaping status: Never Used   Substance Use Topics    Alcohol use: No    Drug use: No           8/31/2023   LAST FHS-7 RESULTS   1st degree relative breast or ovarian cancer No   Any relative bilateral breast cancer No   Any male have breast cancer No   Any ONE woman have BOTH breast AND ovarian cancer No   Any woman with breast cancer before 50yrs No   2 or more relatives with breast AND/OR ovarian cancer No   2 or more relatives with breast AND/OR bowel cancer No                   9/12/2024   STI Screening   New sexual partner(s) since last STI/HIV test? No        History of abnormal Pap smear: No - age 30- 64 PAP with HPV every 5 years recommended        Latest Ref Rng & Units 8/15/2023     2:10 PM 5/23/2019    11:35 AM 5/23/2019    11:24 AM   PAP / HPV   PAP  Negative for Intraepithelial Lesion or Malignancy (NILM)      PAP (Historical)    NIL    HPV 16 DNA Negative Negative  Negative     HPV 18 DNA Negative Negative  Negative     Other HR HPV Negative Negative  Negative       ASCVD Risk   The 10-year ASCVD risk score (Frank DC, et al., 2019) is: 0.5%    Values used to calculate the score:      Age: 44 years      Sex: Female      Is Non- : No      Diabetic: No      Tobacco smoker: No      Systolic Blood Pressure: 117 mmHg      Is BP treated: No      HDL Cholesterol: 51 mg/dL      Total Cholesterol: 173 mg/dL        9/12/2024   Contraception/Family Planning   Questions about contraception or family planning No           Reviewed and  "updated as needed this visit by Provider                          Review of Systems  Constitutional, HEENT, cardiovascular, pulmonary, gi and gu systems are negative, except as otherwise noted.     Objective    Exam  /80   Pulse 57   Temp 98.1  F (36.7  C) (Temporal)   Resp 20   Ht 1.542 m (5' 0.71\")   Wt 56.2 kg (123 lb 12.8 oz)   LMP 08/13/2024   SpO2 97%   BMI 23.62 kg/m     Estimated body mass index is 23.62 kg/m  as calculated from the following:    Height as of this encounter: 1.542 m (5' 0.71\").    Weight as of this encounter: 56.2 kg (123 lb 12.8 oz).    Physical Exam  GENERAL: alert and no distress  EYES: Eyes grossly normal to inspection, PERRL and conjunctivae and sclerae normal  HENT: ear canals and TM's normal, nose and mouth without ulcers or lesions  NECK: no adenopathy, no asymmetry, masses, or scars  RESP: lungs clear to auscultation - no rales, rhonchi or wheezes  CV: regular rate and rhythm, normal S1 S2, no S3 or S4, no murmur, click or rub, no peripheral edema  ABDOMEN: soft, nontender, no hepatosplenomegaly, no masses and bowel sounds normal  MS: no gross musculoskeletal defects noted, no edema  SKIN: no suspicious lesions or rashes  NEURO: Normal strength and tone, mentation intact and speech normal  PSYCH: mentation appears normal, affect normal/bright        Signed Electronically by: Mikki Gonzalez PA-C    Answers submitted by the patient for this visit:  Patient Health Questionnaire (Submitted on 9/12/2024)  If you checked off any problems, how difficult have these problems made it for you to do your work, take care of things at home, or get along with other people?: Somewhat difficult  PHQ9 TOTAL SCORE: 10  Patient Health Questionnaire (G7) (Submitted on 9/12/2024)  TONO 7 TOTAL SCORE: 7    "

## 2024-09-12 NOTE — PATIENT INSTRUCTIONS
Left rib/abdominal pain - try tums, pepto or pepcid (famotidine)         Patient Education   Preventive Care Advice   This is general advice given by our system to help you stay healthy. However, your care team may have specific advice just for you. Please talk to your care team about your preventive care needs.  Nutrition  Eat 5 or more servings of fruits and vegetables each day.  Try wheat bread, brown rice and whole grain pasta (instead of white bread, rice, and pasta).  Get enough calcium and vitamin D. Check the label on foods and aim for 100% of the RDA (recommended daily allowance).  Lifestyle  Exercise at least 150 minutes each week  (30 minutes a day, 5 days a week).  Do muscle strengthening activities 2 days a week. These help control your weight and prevent disease.  No smoking.  Wear sunscreen to prevent skin cancer.  Have a dental exam and cleaning every 6 months.  Yearly exams  See your health care team every year to talk about:  Any changes in your health.  Any medicines your care team has prescribed.  Preventive care, family planning, and ways to prevent chronic diseases.  Shots (vaccines)   HPV shots (up to age 26), if you've never had them before.  Hepatitis B shots (up to age 59), if you've never had them before.  COVID-19 shot: Get this shot when it's due.  Flu shot: Get a flu shot every year.  Tetanus shot: Get a tetanus shot every 10 years.  Pneumococcal, hepatitis A, and RSV shots: Ask your care team if you need these based on your risk.  Shingles shot (for age 50 and up)  General health tests  Diabetes screening:  Starting at age 35, Get screened for diabetes at least every 3 years.  If you are younger than age 35, ask your care team if you should be screened for diabetes.  Cholesterol test: At age 39, start having a cholesterol test every 5 years, or more often if advised.  Bone density scan (DEXA): At age 50, ask your care team if you should have this scan for osteoporosis (brittle  bones).  Hepatitis C: Get tested at least once in your life.  STIs (sexually transmitted infections)  Before age 24: Ask your care team if you should be screened for STIs.  After age 24: Get screened for STIs if you're at risk. You are at risk for STIs (including HIV) if:  You are sexually active with more than one person.  You don't use condoms every time.  You or a partner was diagnosed with a sexually transmitted infection.  If you are at risk for HIV, ask about PrEP medicine to prevent HIV.  Get tested for HIV at least once in your life, whether you are at risk for HIV or not.  Cancer screening tests  Cervical cancer screening: If you have a cervix, begin getting regular cervical cancer screening tests starting at age 21.  Breast cancer scan (mammogram): If you've ever had breasts, begin having regular mammograms starting at age 40. This is a scan to check for breast cancer.  Colon cancer screening: It is important to start screening for colon cancer at age 45.  Have a colonoscopy test every 10 years (or more often if you're at risk) Or, ask your provider about stool tests like a FIT test every year or Cologuard test every 3 years.  To learn more about your testing options, visit:   .  For help making a decision, visit:   https://bit.ly/kt65696.  Prostate cancer screening test: If you have a prostate, ask your care team if a prostate cancer screening test (PSA) at age 55 is right for you.  Lung cancer screening: If you are a current or former smoker ages 50 to 80, ask your care team if ongoing lung cancer screenings are right for you.  For informational purposes only. Not to replace the advice of your health care provider. Copyright   2023 [employer]. All rights reserved. Clinically reviewed by the [facility] Transitions Program. [employer] [id number] - REV 01/24.  Learning About Depression Screening  What is depression screening?  Depression screening is a way to see if you have depression  "symptoms. It may be done by a doctor or counselor. It's often part of a routine checkup. That's because your mental health is just as important as your physical health.  Depression is a mental health condition that affects how you feel, think, and act. You may:  Have less energy.  Lose interest in your daily activities.  Feel sad and grouchy for a long time.  Depression is very common. It affects people of all ages.  Many things can lead to depression. Some people become depressed after they have a stroke or find out they have a major illness like cancer or heart disease. The death of a loved one or a breakup may lead to depression. It can run in families. Most experts believe that a combination of inherited genes and stressful life events can cause it.  What happens during screening?  You may be asked to fill out a form about your depression symptoms. You and the doctor will discuss your answers. The doctor may ask you more questions to learn more about how you think, act, and feel.  What happens after screening?  If you have symptoms of depression, your doctor will talk to you about your options.  Doctors usually treat depression with medicines or counseling. Often, combining the two works best. Many people don't get help because they think that they'll get over the depression on their own. But people with depression may not get better unless they get treatment.  The cause of depression is not well understood. There may be many factors involved. But if you have depression, it's not your fault.  A serious symptom of depression is thinking about death or suicide. If you or someone you care about talks about this or about feeling hopeless, get help right away.  It's important to know that depression can be treated. Medicine, counseling, and self-care may help.  Where can you learn more?  Go to https://www.healthwise.net/patiented  Enter T185 in the search box to learn more about \"Learning About Depression " "Screening.\"  Current as of: June 24, 2023  Content Version: 14.1    2478-1564 [employer].   Care instructions adapted under license by your healthcare professional. If you have questions about a medical condition or this instruction, always ask your healthcare professional. [employer] disclaims any warranty or liability for your use of this information.       "

## 2024-09-13 ENCOUNTER — MYC MEDICAL ADVICE (OUTPATIENT)
Dept: FAMILY MEDICINE | Facility: CLINIC | Age: 44
End: 2024-09-13
Payer: COMMERCIAL

## 2024-09-13 LAB
ALBUMIN SERPL BCG-MCNC: 4.2 G/DL (ref 3.5–5.2)
ALP SERPL-CCNC: 54 U/L (ref 40–150)
ALT SERPL W P-5'-P-CCNC: 12 U/L (ref 0–50)
ANION GAP SERPL CALCULATED.3IONS-SCNC: 9 MMOL/L (ref 7–15)
AST SERPL W P-5'-P-CCNC: 20 U/L (ref 0–45)
BILIRUB SERPL-MCNC: 0.3 MG/DL
BUN SERPL-MCNC: 8.9 MG/DL (ref 6–20)
CALCIUM SERPL-MCNC: 9.3 MG/DL (ref 8.8–10.4)
CHLORIDE SERPL-SCNC: 105 MMOL/L (ref 98–107)
CREAT SERPL-MCNC: 0.81 MG/DL (ref 0.51–0.95)
EGFRCR SERPLBLD CKD-EPI 2021: >90 ML/MIN/1.73M2
FERRITIN SERPL-MCNC: 16 NG/ML (ref 6–175)
GLUCOSE SERPL-MCNC: 84 MG/DL (ref 70–99)
HCO3 SERPL-SCNC: 26 MMOL/L (ref 22–29)
POTASSIUM SERPL-SCNC: 4 MMOL/L (ref 3.4–5.3)
PROT SERPL-MCNC: 6.6 G/DL (ref 6.4–8.3)
SODIUM SERPL-SCNC: 140 MMOL/L (ref 135–145)

## 2024-09-19 ENCOUNTER — ANCILLARY PROCEDURE (OUTPATIENT)
Dept: ULTRASOUND IMAGING | Facility: CLINIC | Age: 44
End: 2024-09-19
Attending: PHYSICIAN ASSISTANT
Payer: COMMERCIAL

## 2024-09-19 ENCOUNTER — ANCILLARY PROCEDURE (OUTPATIENT)
Dept: MAMMOGRAPHY | Facility: CLINIC | Age: 44
End: 2024-09-19
Attending: PHYSICIAN ASSISTANT
Payer: COMMERCIAL

## 2024-09-19 DIAGNOSIS — Z12.31 VISIT FOR SCREENING MAMMOGRAM: ICD-10-CM

## 2024-09-19 DIAGNOSIS — N92.1 METRORRHAGIA: ICD-10-CM

## 2024-09-19 PROCEDURE — 76830 TRANSVAGINAL US NON-OB: CPT | Mod: TC | Performed by: RADIOLOGY

## 2024-09-19 PROCEDURE — 76856 US EXAM PELVIC COMPLETE: CPT | Mod: TC | Performed by: RADIOLOGY

## 2024-09-19 PROCEDURE — 77067 SCR MAMMO BI INCL CAD: CPT | Mod: TC | Performed by: RADIOLOGY

## 2024-09-19 PROCEDURE — 77063 BREAST TOMOSYNTHESIS BI: CPT | Mod: TC | Performed by: RADIOLOGY

## 2024-09-24 ENCOUNTER — OFFICE VISIT (OUTPATIENT)
Dept: URGENT CARE | Facility: URGENT CARE | Age: 44
End: 2024-09-24
Payer: COMMERCIAL

## 2024-09-24 VITALS
TEMPERATURE: 98.9 F | OXYGEN SATURATION: 99 % | HEART RATE: 75 BPM | BODY MASS INDEX: 24.04 KG/M2 | RESPIRATION RATE: 18 BRPM | SYSTOLIC BLOOD PRESSURE: 123 MMHG | DIASTOLIC BLOOD PRESSURE: 78 MMHG | WEIGHT: 126 LBS

## 2024-09-24 DIAGNOSIS — J02.9 SORE THROAT: Primary | ICD-10-CM

## 2024-09-24 LAB
DEPRECATED S PYO AG THROAT QL EIA: NEGATIVE
FLUAV AG SPEC QL IA: NEGATIVE
FLUBV AG SPEC QL IA: NEGATIVE

## 2024-09-24 PROCEDURE — 87651 STREP A DNA AMP PROBE: CPT

## 2024-09-24 PROCEDURE — 87804 INFLUENZA ASSAY W/OPTIC: CPT

## 2024-09-24 PROCEDURE — 87635 SARS-COV-2 COVID-19 AMP PRB: CPT

## 2024-09-24 PROCEDURE — 99213 OFFICE O/P EST LOW 20 MIN: CPT

## 2024-09-24 NOTE — PROGRESS NOTES
URGENT CARE  Assessment & Plan   Assessment:   Brianna Abdalla is a 44 year old female who's clinical presentation today is consistent with:   1. Sore throat  - Streptococcus A Rapid Screen w/Reflex to PCR - Clinic Collect  - Influenza A & B Antigen - Clinic Collect  - COVID-19 Virus (Coronavirus) by PCR Nose  Plan:  Will treat patient with supportive and symptomatic measures for pharyngitis and suspected viral syndrome at this time which include: Fluids, rest, over-the-counter medications, such as tylenol, ibuprofen;  tested for bacterial cause and rapid test was negative for streptococcal A; PCR test pending (updated pt results will come via mychart/call and rx will be reflexed if positive)  Patient had mild oropharynx erythema but no findings concerning for peritonsillar abscess or cellulitis, Marvin's angina, retropharyngeal abscess, or deep space neck infection  educated patient to monitor their symptoms for improvement over the next week and to return for a follow up if the sore throat and/or tonsil swelling does not improve in the next 5-7 days, sooner if symptoms worsen, return precautions given  No alarm signs or symptoms present   Differential Diagnoses for this patient's chief complaint that I considered include:  Bacterial vs viral etiology of URI, covid, pharyngitis/tonsillitis, pneumonia, bronchitis, Common cold, allergic rhinitis, seasonal allergies, ABRS, viral sinusitis, cough, pertussis, Mononucleosis, tonsillitis, chronic sinusitis, meningococcal disease     Patient is} agreeable to treatment plan and state they will follow-up if symptoms do not improve and/or if symptoms worsen   see patient's AVS 'monitor for' section for specific patient instructions given and discussed regarding what to watch for and when to follow up    JACKIE Elias [facility]      ______________________________________________________________________      Subjective     HPI: Brianna Abdalla   is a 44 year old  female who presents today for evaluation the following concerns:   Patient  endorses flu like symptoms today which started 4 days ago   Patient reports sinus congestion, ear pain, sore throat, mild cough,   Patient denies any current fevers, wheezing, shortness of breath, difficulty breathing,  weakness or signs of dehydration     Review of Systems:  Pertinent review of systems as reflected in HPI, otherwise negative.     Objective    Physical Exam:  Vitals:    09/24/24 1651   BP: 123/78   Pulse: 75   Resp: 18   Temp: 98.9  F (37.2  C)   TempSrc: Tympanic   SpO2: 99%   Weight: 57.2 kg (126 lb)      General: Alert and oriented, no acute distress, Vital signs reviewed: afebrile,  normotensive  Psy/mental status: Cooperative, nonanxious  SKIN: Intact, no rashes  EYES: EOMs intact, PERRLA bilaterally   Conjunctiva: Clear bilaterally, no injection or erythema present  EARS: TMs intact, translucent gray in color with normal landmarks present no erythema  or bulging tympanic membrane   Canals are without swelling, however have a mild amount of cerumen, no impaction  NOSE:  mucosa moist               No frontal or maxillary sinus tenderness present bilaterally  MOUTH/THROAT: lips, tongue, & oral mucosa appear normal upon inspection                Posterior oropharynx is erythematous but without exudate, lesions or tonsillar  Edema, no dysphonia, no unilateral tonsillar edema, no uvular deviation,   no signs of peritonsillar abscess  NECK: supple, has full range of motion with no meningeal signs              No lymphadenopathy present  LUNG: normal work of breathing, good respiratory effort without retractions, good air  movement, non labored, inspection reveals normal chest expansion w/  inspiration            Lung sounds are clear to auscultation bilaterally,            No rales/rhonic/crackles wheezing noted           No cough noted or heard while in clinic     LABS:   Results for orders placed or  performed in visit on 09/24/24   Streptococcus A Rapid Screen w/Reflex to PCR - Clinic Collect     Status: Normal    Specimen: Throat; Swab   Result Value Ref Range    Group A Strep antigen Negative Negative   Influenza A & B Antigen - Clinic Collect     Status: Normal    Specimen: Nose; Swab   Result Value Ref Range    Influenza A antigen Negative Negative    Influenza B antigen Negative Negative    Narrative    Test results must be correlated with clinical data. If necessary, results should be confirmed by a molecular assay or viral culture.        ______________________________________________________________________    I explained my diagnostic considerations and recommendations to the patient, who voiced understanding and agreement with the treatment plan.   All questions were answered.   We discussed potential side effects, risks and benefits of any prescribed or recommended therapies, as well as expectations for response to treatments.  Please see AVS for any patient instructions & handouts given.   Patient was advised to contact the Nurse Care Line, their Primary Care provider, Urgent Care, or the Emergency Department if there are new or worsening symptoms, or call 911 for emergencies.

## 2024-09-24 NOTE — PATIENT INSTRUCTIONS
"Diagnosis: viral URI_ upper respiratory infection    Nothing dangerous appearing   Today we did:  Influenza, strep and covid - will result via mychart   If anything comes back we will make a plan based on those results    Plan:   Fluids: you need to drink lots of fluids: water, electrolytes, broth    Rest: you need lots and lots of rest to get better, you have permission to sleep all day and stay home from work or school until you feel better   Treat the most bothersome symptoms.... see symptoms below.....   Monitor for:   Fever: >101 F that is not relieved w/ tylenol, worsening fevers   Difficulty breathing: shortness of breath, wheezing, chest pain with breathing   Signs of dehydration: Feeling weak, dizzy or that you might faint  Chest pain   You have been fighting this illness for >14 days and are not getting better           Cold and Flu     Unfortunately, <2% of sinus/throat/cough symptoms are caused by a bacteria   However, You are SICK! This is often miserable! And I feel for you!   We cannot make it go away, but lets work to shorten the duration and severity!   Your most bothersome symptom is often where the virus settled in your body:    Nose, throat, or lungs, it may cause cough, sore throat, congestion, runny nose, headache, earache or shortness of breath.   It can also settle in your stomach and cause nausea, vomiting, and diarrhea.   Sometimes it causes generalized symptoms like \"aching all over,\" feeling tired, loss of energy, or loss of appetite.  ------- This illness usually lasts anywhere from 10-14 days ----------- hang in there.         We Treat URIs by helping relieve symptoms     Symptoms: - what is your most bothersome symptom?   Nasal congestion:           Decongestants:                > Pseudoephedrine (Sudafed) 60mg every 4 hours (not before bedtime)      Children >4yrs old: 15mg every 4hours chew (1mg/kg every 6hrs)       Liquid: Children's Silfedrine: 15 mg/5 mL      children >2 years old " Phenylephrine (sudafed PE child)             Antihistamines:    > chlorpheniramine 4mg Q4hrs -or- clemastine 1mg twice a day (no more than 7 days)      Children >2yrs old: Claritin or zyrtec      >> add ibuprofen or tylenol for added effect   cough:          antitussives :: suppresses cough by topical anesthetic action on the respiratory stretch receptor    tessalon perles / Benzonatate - need prescription      >only in children >10yrs of age          Expectorants  ::increase respiratory tract clearance of mucus by adding hydration/viscosity causing thinning and loosening   Guaifenesin AND Dextromethorphan :: [adds cough Suppressant] inhibits cough reflex by decreasing cough receptors (relieves the irritating  dry cough)   Robitussin DM / Mucinex DM   Guaifenesin 200 to 400 mg // dextromethorphan 10 to 20 mg every 4 hours,   Combo tabs: 1-2 tabs every 12hrs         Children 4yr to <6 years: Dextromethorphan 2.5 to 5 mg with Guaifenesin 50 to 100 mg every  4 hours as needed  sore throat:   gargle saltwater, honey, warm fluids          cough drops or lozenges:    Cepacol Lozenge / Benzocaine     Children >5yrs old : one lozenge every 2 hours   Herbal:    - honey = good for cough suppressant    - zinc = 2-3mg lozenge Q2hrs    - menthol vapor rup on chest before sleep

## 2024-09-25 LAB
GROUP A STREP BY PCR: NOT DETECTED
SARS-COV-2 RNA RESP QL NAA+PROBE: POSITIVE

## 2024-09-30 NOTE — PROGRESS NOTES
Assessment & Plan     Intramural and subserous leiomyoma of uterus  See below  - Ob/Gyn  Referral    Abnormal uterine bleeding (AUB)  We reviewed her menstrual cycle changes, LLQ pain and ultrasound result. Discussed possibility of an endometrial polyp-not definitively noted. Discussed endometrial polyps in relation to changes in bleeding. Discussed the several uterine fibroids-intramural and subserous. We discussed uterine fibroids in general-regression, management. Discussed endometrial sampling given her menstrual cycle changes as well.   Discussed monitoring vs removal of the possible uterine polyp.  Discussed continuing combined oral contraceptive pill vs alternate management options. No contraindications to use of hormonal medications, discussed slight increased risk of thromboembolic events with estrogen use and Drospirenone use as well. Discussed other options of managing bleeding including progesterone only options. Patient has concern that there is a polyp present and its contribution to her bleeding. Questions removal and we discussed hysteroscopic evaluation-possible polypectomy. Discussed if she opted to go this route, endometrial biopsy could be done at the same time for evaluation-also offered to complete in clinic today.  At this time, she is leaning towards hysteroscopic evaluation of the possible polyp with removal if indicated, would then prefer endometrial biopsy completed at same time and also insertion of a Mirena IUD.   She is aware surgery may be several months out. Would plan to continue oral contraceptive pills at this time.   Again offered endometrial biopsy and IUD insertion in clinic, but would like the possible polyp evaluated further. Will schedule consult with GYN physician to see if hysteroscopy is appropriate follow up.    Cyst of left ovary  We discussed the ovarian cyst-explained the difference between functional cysts, benign and malignant cystic tumors.  Discussed the  increased concern associated with increasing complexity. Discussed resolution over the next few menstrual cycles, reviewed symptoms of rupture.  Patient is given an opportunity to ask questions and have them answered.    32 minutes spent by me on the date of the encounter doing chart review, history and exam, documentation and further activities per the note       Subjective   Brianna is a 44 year old, presenting for the following health issues:  Consult (Uterine Fibroids/ Endometrial polyp/ Complex cyst)    HPI       Consult- Uterine fibroids/ Endometrial polyp/ Complex cyst    Patient was seen by primary care for her routine annual visit last month. Had mentioned menstrual cycle concerns.  Cycles are regular overall, but have become heavier and more painful-cramping and low back pain. Can have spotting the week prior to her full flow, the regular flow then lasts 4 days. Feels wiped out from the bleeding. No intermenstrual bleeding otherwise. Additionally, for the last 3 months, noting a sharp/stabbing type pain in the LLQ. This is intermittent, not related to activity, movement, position. So real alleviating factor, but does resolve on its own.  Provider started her on low dose Марина for managing cycles-currently on her first pack, ordered pelvic ultrasound:    US PELVIC TRANSABDOMINAL AND TRANSVAGINAL 9/19/2024 8:58 AM     CLINICAL HISTORY: Metrorrhagia.     TECHNIQUE: Transabdominal scans were performed. Endovaginal ultrasound  was performed to better visualize the adnexa.     COMPARISON: None.     FINDINGS:     UTERUS: 9.9 x 5.7 x 6.5 cm. Left fundal fibroid is 1.7 x 3.0 x 3.2 cm  appearing intramural. Left fundal fibroid is 3.4 x 2.6 x 3.3 cm  appearing subserosal and intramural. This second fibroid also appears  to contact the distal endometrium.     ENDOMETRIUM: 8 mm. Potential endometrial polyp measuring 1.0 x 0.8 x  1.2 cm distally. This shows some potential perfusion at color imaging.     RIGHT OVARY: 2.3 x  "1.5 x 3.0 cm. No focal lesion.     LEFT OVARY: 2.3 x 2.0 x 2.4 cm. Complex cyst is 1.8 x 1.3 x 1.2 cm.  There is an echogenic nodule at the left ovary measuring 1.0 x 0.8 x  1.2 cm.     No significant free fluid.                                                                      IMPRESSION:  1.  Uterine fibroids.  2.  Potential endometrial polyp measuring 1.0 cm noted at the distal  endometrium.  3.  Complex cyst at the left ovary is noted.     Recommendation was made to see GYN for follow up.  Overall seems to be tolerating the Марина ok, had 1 episode of some spotting since starting the pills.   2 previous C/Sections.       Review of Systems  Constitutional, HEENT, cardiovascular, pulmonary, gi and gu systems are negative, except as otherwise noted.      Objective    /73 (BP Location: Right arm, Patient Position: Sitting, Cuff Size: Adult Regular)   Pulse 71   Ht 1.542 m (5' 0.71\")   Wt 57.2 kg (126 lb)   LMP 09/13/2024 (Approximate)   SpO2 98%   BMI 24.04 kg/m    Body mass index is 24.04 kg/m .  Physical Exam   GENERAL: alert and no distress   (female) w/bimanual: normal female external genitalia, normal urethral meatus, normal vaginal mucosa, normal appearing cervix-nulliparous, adnexa/uterus without masses or discharge-tenderness with palpation in left adnexa  MS: no gross musculoskeletal defects noted, no edema  SKIN: no suspicious lesions or rashes  PSYCH: mentation appears normal, affect normal/bright    Signed Electronically by: JACKIE Mendoza CNP    "

## 2024-10-03 ENCOUNTER — OFFICE VISIT (OUTPATIENT)
Dept: OBGYN | Facility: CLINIC | Age: 44
End: 2024-10-03
Payer: COMMERCIAL

## 2024-10-03 VITALS
OXYGEN SATURATION: 98 % | SYSTOLIC BLOOD PRESSURE: 109 MMHG | HEART RATE: 71 BPM | HEIGHT: 61 IN | WEIGHT: 126 LBS | DIASTOLIC BLOOD PRESSURE: 73 MMHG | BODY MASS INDEX: 23.79 KG/M2

## 2024-10-03 DIAGNOSIS — N93.9 ABNORMAL UTERINE BLEEDING (AUB): Primary | ICD-10-CM

## 2024-10-03 DIAGNOSIS — D25.1 INTRAMURAL AND SUBSEROUS LEIOMYOMA OF UTERUS: ICD-10-CM

## 2024-10-03 DIAGNOSIS — D25.2 INTRAMURAL AND SUBSEROUS LEIOMYOMA OF UTERUS: ICD-10-CM

## 2024-10-03 DIAGNOSIS — N83.202 CYST OF LEFT OVARY: ICD-10-CM

## 2024-10-03 PROCEDURE — 99203 OFFICE O/P NEW LOW 30 MIN: CPT | Performed by: NURSE PRACTITIONER

## 2024-10-03 PROCEDURE — 99459 PELVIC EXAMINATION: CPT | Performed by: NURSE PRACTITIONER

## 2024-10-03 NOTE — PATIENT INSTRUCTIONS
If you have any questions regarding your visit, Please contact your care team.     [employer] [facility]: [phone]  To Schedule an Appointment 24/7  Call: [phone]     Anisha Ramirez- APRN CNP      Nya Paulino-Surgery Scheduler  Ana-Surgery Scheduler         Monday 7:30am-2:00pm    Tuesday 7:30am-4:00pm    Wednesday 7:30am-2:00pm    Thursday 7:30am-11:00am    Friday 7:30am-2:00pm [address] [address]  Phone- [phone]   Fax- [phone]     Imaging Scheduling all locations  [phone]    [facility]  [address] [place] [address]   [place] [address] [phone]         Urgent Care locations:  [facility]   Monday-Friday  10 am - 8 pm  Saturday and Sunday   9 am - 5 pm     [phone]   If you need a medication refill, please contact your pharmacy. Please allow 3 business days for your refill to be completed.  As always, Thank you for trusting us with your healthcare needs!      see additional instructions from your care team below

## 2025-04-24 ENCOUNTER — OFFICE VISIT (OUTPATIENT)
Dept: FAMILY MEDICINE | Facility: CLINIC | Age: 45
End: 2025-04-24
Payer: COMMERCIAL

## 2025-04-24 VITALS
RESPIRATION RATE: 20 BRPM | HEIGHT: 60 IN | SYSTOLIC BLOOD PRESSURE: 127 MMHG | DIASTOLIC BLOOD PRESSURE: 82 MMHG | OXYGEN SATURATION: 99 % | BODY MASS INDEX: 24.74 KG/M2 | TEMPERATURE: 98.8 F | HEART RATE: 84 BPM | WEIGHT: 126 LBS

## 2025-04-24 DIAGNOSIS — R10.32 LLQ ABDOMINAL PAIN: ICD-10-CM

## 2025-04-24 DIAGNOSIS — Z86.2 HX OF THROMBOCYTOPENIA: ICD-10-CM

## 2025-04-24 DIAGNOSIS — D25.9 UTERINE LEIOMYOMA, UNSPECIFIED LOCATION: ICD-10-CM

## 2025-04-24 DIAGNOSIS — N92.1 METRORRHAGIA: ICD-10-CM

## 2025-04-24 DIAGNOSIS — N83.202 CYST OF LEFT OVARY: Primary | ICD-10-CM

## 2025-04-24 DIAGNOSIS — F41.1 GAD (GENERALIZED ANXIETY DISORDER): ICD-10-CM

## 2025-04-24 DIAGNOSIS — N84.0 ENDOMETRIAL POLYP: ICD-10-CM

## 2025-04-24 DIAGNOSIS — D72.819 LEUKOPENIA, UNSPECIFIED TYPE: ICD-10-CM

## 2025-04-24 DIAGNOSIS — F33.1 MAJOR DEPRESSIVE DISORDER, RECURRENT EPISODE, MODERATE (H): ICD-10-CM

## 2025-04-24 LAB
BASOPHILS # BLD AUTO: 0 10E3/UL (ref 0–0.2)
BASOPHILS NFR BLD AUTO: 1 %
EOSINOPHIL # BLD AUTO: 0.1 10E3/UL (ref 0–0.7)
EOSINOPHIL NFR BLD AUTO: 2 %
ERYTHROCYTE [DISTWIDTH] IN BLOOD BY AUTOMATED COUNT: 12 % (ref 10–15)
ERYTHROCYTE [SEDIMENTATION RATE] IN BLOOD BY WESTERGREN METHOD: 9 MM/HR (ref 0–20)
HCT VFR BLD AUTO: 37.8 % (ref 35–47)
HGB BLD-MCNC: 12.8 G/DL (ref 11.7–15.7)
IMM GRANULOCYTES # BLD: 0 10E3/UL
IMM GRANULOCYTES NFR BLD: 0 %
LYMPHOCYTES # BLD AUTO: 1.4 10E3/UL (ref 0.8–5.3)
LYMPHOCYTES NFR BLD AUTO: 37 %
MCH RBC QN AUTO: 30.2 PG (ref 26.5–33)
MCHC RBC AUTO-ENTMCNC: 33.9 G/DL (ref 31.5–36.5)
MCV RBC AUTO: 89 FL (ref 78–100)
MONOCYTES # BLD AUTO: 0.4 10E3/UL (ref 0–1.3)
MONOCYTES NFR BLD AUTO: 11 %
NEUTROPHILS # BLD AUTO: 1.8 10E3/UL (ref 1.6–8.3)
NEUTROPHILS NFR BLD AUTO: 49 %
PLATELET # BLD AUTO: 173 10E3/UL (ref 150–450)
RBC # BLD AUTO: 4.24 10E6/UL (ref 3.8–5.2)
WBC # BLD AUTO: 3.7 10E3/UL (ref 4–11)

## 2025-04-24 RX ORDER — DROSPIRENONE AND ETHINYL ESTRADIOL 0.02-3(28)
1 KIT ORAL DAILY
Qty: 84 TABLET | Refills: 1 | Status: SHIPPED | OUTPATIENT
Start: 2025-04-24

## 2025-04-24 ASSESSMENT — ENCOUNTER SYMPTOMS
BACK PAIN: 1
NERVOUS/ANXIOUS: 1

## 2025-04-24 ASSESSMENT — PATIENT HEALTH QUESTIONNAIRE - PHQ9
SUM OF ALL RESPONSES TO PHQ QUESTIONS 1-9: 8
SUM OF ALL RESPONSES TO PHQ QUESTIONS 1-9: 8
10. IF YOU CHECKED OFF ANY PROBLEMS, HOW DIFFICULT HAVE THESE PROBLEMS MADE IT FOR YOU TO DO YOUR WORK, TAKE CARE OF THINGS AT HOME, OR GET ALONG WITH OTHER PEOPLE: SOMEWHAT DIFFICULT

## 2025-04-24 ASSESSMENT — PAIN SCALES - GENERAL: PAINLEVEL_OUTOF10: MODERATE PAIN (4)

## 2025-04-29 DIAGNOSIS — R79.0 LOW FERRITIN: Primary | ICD-10-CM

## 2025-04-29 RX ORDER — FERROUS SULFATE 325(65) MG
325 TABLET, DELAYED RELEASE (ENTERIC COATED) ORAL EVERY OTHER DAY
Qty: 45 TABLET | Refills: 3 | Status: SHIPPED | OUTPATIENT
Start: 2025-04-29

## 2025-05-19 NOTE — PATIENT INSTRUCTIONS
If you have labs or imaging done, the results will automatically release in [facility] without an interpretation.  Your health care professional will review those results and send an interpretation with recommendations as soon as possible, but this may be 1-3 business days.    If you have any questions regarding your visit, please contact your care team.     [employer] [facility]: [phone]  [facility] HOURS TELEPHONE NUMBER   DO. Lora Rodarte -Surgery Scheduler  Ana -     ENRIQUE Colón RN Kylie, RN [place]    Monday 8:30 am-5:00 pm  Wednesday 8:30 am-5:00 pm  Friday 8:30 am-5:00 pm    Typical Surgery day: Tuesday [facility]  [address] [place] [address]  Phone:  [phone]  Fax: [phone]   Appointment [phone]    Imaging Scheduling-All Locations [phone]    [facility]  [address] [place] [address] [place] [address] [phone]   **Surgeries** Our Surgery Schedulers will contact you to schedule. If you do not receive a call within 3 business days, please call [phone].  Urgent Care locations:  [facility] Monday-Friday   10 am - 8 pm  Saturday and    9 am - 5 pm [phone]   If you need a medication refill, please contact your pharmacy. Please allow 3 business days for your refill to be completed.  As always, Thank you for trusting us with your healthcare needs!  see additional instructions from your care team below

## 2025-05-21 ENCOUNTER — OFFICE VISIT (OUTPATIENT)
Dept: OBGYN | Facility: CLINIC | Age: 45
End: 2025-05-21
Attending: PHYSICIAN ASSISTANT
Payer: COMMERCIAL

## 2025-05-21 VITALS — DIASTOLIC BLOOD PRESSURE: 81 MMHG | OXYGEN SATURATION: 98 % | SYSTOLIC BLOOD PRESSURE: 118 MMHG | HEART RATE: 68 BPM

## 2025-05-21 DIAGNOSIS — N83.202 CYST OF LEFT OVARY: ICD-10-CM

## 2025-05-21 DIAGNOSIS — R19.7 DIARRHEA, UNSPECIFIED TYPE: ICD-10-CM

## 2025-05-21 DIAGNOSIS — K59.00 CONSTIPATION, UNSPECIFIED CONSTIPATION TYPE: ICD-10-CM

## 2025-05-21 DIAGNOSIS — N84.0 ENDOMETRIAL POLYP: ICD-10-CM

## 2025-05-21 DIAGNOSIS — N92.1 METRORRHAGIA: Primary | ICD-10-CM

## 2025-05-21 DIAGNOSIS — N88.2 CERVICAL STENOSIS (UTERINE CERVIX): ICD-10-CM

## 2025-05-21 PROCEDURE — 99214 OFFICE O/P EST MOD 30 MIN: CPT | Performed by: OBSTETRICS & GYNECOLOGY

## 2025-05-21 PROCEDURE — 36415 COLL VENOUS BLD VENIPUNCTURE: CPT | Performed by: OBSTETRICS & GYNECOLOGY

## 2025-05-21 PROCEDURE — 86304 IMMUNOASSAY TUMOR CA 125: CPT | Performed by: OBSTETRICS & GYNECOLOGY

## 2025-05-21 PROCEDURE — 3079F DIAST BP 80-89 MM HG: CPT | Performed by: OBSTETRICS & GYNECOLOGY

## 2025-05-21 PROCEDURE — 3074F SYST BP LT 130 MM HG: CPT | Performed by: OBSTETRICS & GYNECOLOGY

## 2025-05-21 RX ORDER — MISOPROSTOL 200 UG/1
TABLET ORAL
Qty: 4 TABLET | Refills: 0 | Status: SHIPPED | OUTPATIENT
Start: 2025-05-21

## 2025-05-21 NOTE — PROGRESS NOTES
"This 43 y/o female,  (C/S x 2), LMP 2025, recent discontinuation of Марина one month ago, presents to discuss issues with ongoing metrorrhagia and GI complaints.  She has been experiencing heavier than normal periods for \"years\" but they seem to be getting worse over x the past year.  On her heaviest days, she has to change a tampon and pad every 1-1.5 hours x 2-3 days.  She also has experienced episodes of LLQ pain and lower back pain which appears unrelated to her menses coupled with bouts of constipation alternating with diarrhea.  She has not had GI evaluation to-date.  She had been taking Марина but quit taking it earlier this month to see if this was causing her GI symptoms.  Her recent pelvic US on 2025 demonstrated a 2.9 x 2.4 x 2.2 cm fibroid but no actual polyp.  The endometrial stripe measured 10 mm and a minimally complex 2.3 cm left ovarian cyst was noted.  Since the radiologist felt that this was physiologic, no further radiologic evaluation was advised.  However, her previous US on 2024 showed a 1 x 0.8 x 1.2 cm endometrial polyp so she has concerns regarding this and would like further evaluation.  She is taking iron replacement therapy but declines a hgb and ferritin draw today since she just started taking this 1 month ago.  She has not had a CA-125 marker drawn to-date so will check this today.  She denies any family hx of gyn/ovarian cancer.    /81   Pulse 68   LMP 2025 (Approximate)   SpO2 98%   Breastfeeding No   ROS:  10 systems were reviewed and the positives were listed under problems.  PE:  General- Healthy-appearing female in no acute distress  Eyes- No scleral injection or icterus  ENT- Mucus membranes moist  CV- No noticeable edema in extremities  Lymph- No noticeable cervical adenopathy  Respiratory- Non-labored breathing  Skin- No suspicious lesions or rashes visualized  Psych- Normal affect  Assessment - Metrorrhagia, suspected cervical stenosis due to " history of 2 cesareans, possible endometrial polyp per previous pelvic US on 9/19/2024, left ovarian cyst, recent GI changes including constipation and diarrhea, LLQ pain and low back pain  Plan - Will refer to GI for constipation and diarrhea issues since this is a change in bowel habits for her.  Will collect a CA-125 marker test due to the presence of a left ovarian cyst.  We discussed an office endometrial biopsy versus operative hysteroscopy evaluation of the endometrium since she has not had tissue sampling collected to-date.  She prefers the hysteroscopic approach in \[facility]\"" so the worksheet was sent to the surgery scheduler.  I advised preoperative use of Cytotec vaginally since severe cervical stenosis is suspected since she has never had a vaginal delivery.  Instructions were provided verbally and in writing.  All her questions and concerns were addressed.  30 minutes were spent today in chart review, the patient visit, review of tests, and documentation in regard to the issues noted above.

## 2025-05-21 NOTE — PROGRESS NOTES
[facility] SURGERY PLANNING/SCHEDULING WORKSHEET                                                     Brianna Abdalla                :  1980  MRN:  [id number]  Home Phone [phone]   Work Phone Not on file.   Mobile [phone]      Surgeon: Smita Young DO    DIAGNOSIS:  Metrorrhagia, abnormal pelvic ultrasound finding of an endometrial polyp    SURGICAL PROCEDURE:  Exam under anesthesia, operative hysteroscopy using MyoSure, polypectomy, and dilatation and curettage    Surgery Location:  [facility]  Patient Surgery Class:  SDS  Length of Procedure:  60 minutes  Type of anesthesia:  MAC    Multi-surgeon case: No  Additional OR technician needed for assistance?:   No  Vendor needed: No  Positioning:  Lithotomy  Laterality:  NA  Date requested:  When not on menses    Special Equipment: MyoSure hysteroscope  Special Instructions for patient:  Per the [facility] Pre-Admission Nurse when they call the patient prior to the surgery date.   Precautions:  NONE  :  NOT NEEDED    Sterilization consent:  Not applicable to procedure being performed.    Preop: Pre-op options: Surgeon  Pre-surgery consult needed:  Not applicable.  Postop evaluation needed:  2 weeks    ALLERGIES:   Allergies   Allergen Reactions    Amoxicillin     Iodine I 131 Tositumomab       BMI:There is no height or weight on file to calculate BMI.      The proposed surgical procedure is considered LOW risk.    Smita Young DO  FACOG, FACS  2025

## 2025-05-22 ENCOUNTER — RESULTS FOLLOW-UP (OUTPATIENT)
Dept: OBGYN | Facility: CLINIC | Age: 45
End: 2025-05-22

## 2025-05-22 ENCOUNTER — TELEPHONE (OUTPATIENT)
Dept: OBGYN | Facility: CLINIC | Age: 45
End: 2025-05-22

## 2025-05-22 LAB — CANCER AG125 SERPL-ACNC: 69 U/ML

## 2025-05-22 NOTE — TELEPHONE ENCOUNTER
Pt would like to meet with gyn/onc as suggested by Dr. Young in the CA-125 result note.  She states she would just like to get their opinion.    Routing to Dr. Young to place the referral.    Katarzyna Aparicio RN-[facility]

## 2025-05-22 NOTE — TELEPHONE ENCOUNTER
[facility] SURGERY PLANNING/SCHEDULING WORKSHEET                                                     Brianna Abdalla                :  1980  MRN:  [id number]  Home Phone [phone]   Work Phone Not on file.   Mobile [phone]      Surgeon: Smita Young DO     DIAGNOSIS:  Metrorrhagia, abnormal pelvic ultrasound finding of an endometrial polyp     SURGICAL PROCEDURE:  Exam under anesthesia, operative hysteroscopy using MyoSure, polypectomy, and dilatation and curettage     Surgery Location:  [facility]  Patient Surgery Class:  SDS  Length of Procedure:  60 minutes  Type of anesthesia:  MAC     Multi-surgeon case: No  Additional OR technician needed for assistance?:   No  Vendor needed: No  Positioning:  Lithotomy  Laterality:  NA  Date requested:  When not on menses     Special Equipment: MyoSure hysteroscope  Special Instructions for patient:  Per the [facility] Nurse when they call the patient prior to the surgery date.   Precautions:  NONE  :  NOT NEEDED     Sterilization consent:  Not applicable to procedure being performed.     Preop: Pre-op options: Surgeon  Pre-surgery consult needed:  Not applicable.  Postop evaluation needed:  2 weeks     ALLERGIES:   Allergies        Allergies   Allergen Reactions    Amoxicillin      Iodine I 131 Tositumomab           BMI:There is no height or weight on file to calculate BMI.       The proposed surgical procedure is considered LOW risk.     Smita Young DO  FACOG, [facility]  2025  SURGERY SCHEDULING AND PRECERTIFICATION    Medical Record Number: [id number]  Brianna Munozard  YOB: 1980   Phone: [phone] (home)   Primary Provider: Mikki Gonzalez    Reason for Admit:  ICD-10 CODE:  N92.1, N84.0    Surgeon: Smita Young DO   Surgical Procedure: Exam under anesthesia, operative hysteroscopy using MyoSure, polypectomy, and dilatation and curettage    Date of Surgery 2025 Time of Surgery 11:40  AM  Surgery to be performed at:  [facility]   Status: Outpatient  Type of Anesthesia Anticipated: MAC    Sterilization consent:  Not applicable to procedure being performed.    Pre-Op: On 07/24/2025 with MEHDI Valentine at [facility]  COVID testing:  Per Provider's discretion Covid testing is not indicated.     Post-Op:   2 weeks on 08/15/2025 with Smita Young DO  at [facility]  Pre-certification routed to [employer]:  Auto routes via Case Request    Surgery packet: Sent via [employer]  Patient instructed NPO 12 hours prior to surgery, arrive according to the time the nurse gives patient when called prior to surgery, must have a .  Patient understood and agrees to the plan.      Requestor:  Fortino Campos     Location:  [facility]'s [phone]

## 2025-05-27 ENCOUNTER — PREP FOR PROCEDURE (OUTPATIENT)
Dept: OBGYN | Facility: CLINIC | Age: 45
End: 2025-05-27
Payer: COMMERCIAL

## 2025-05-27 ENCOUNTER — PATIENT OUTREACH (OUTPATIENT)
Dept: ONCOLOGY | Facility: CLINIC | Age: 45
End: 2025-05-27
Payer: COMMERCIAL

## 2025-05-27 DIAGNOSIS — N92.1 MENOMETRORRHAGIA: Primary | ICD-10-CM

## 2025-05-27 DIAGNOSIS — N84.0 ENDOMETRIAL POLYP: ICD-10-CM

## 2025-05-27 NOTE — PROGRESS NOTES
New Patient Hematology / Oncology Nurse Navigator Note     Referral Date: 5/23/25    Referring provider:   Smita Young,    [address]   [facility] [address]   Phone: [phone]   Fax: [phone]       Referring Clinic/Organization: [facility]     Referred to: GynOnc    Requested provider (if applicable): First available - did not specify     Evaluation for :    Patient would like opinion on left ovarian cyst coupled with abnormally elevated CA-125 marker result        Clinical History (per Nurse review of records provided):    5/1/25 Pelvic US:                                                                 IMPRESSION:  1.  2.9 cm fibroid in the mid uterus anteriorly. At least a portion of this fibroid is likely in a subendometrial location. A second smaller fibroid seen on the prior study is not clearly identified today.   2.  Endometrial stripe thickness is 10 mm. No definite endometrial polyp identified on today's study. However, polyp is not excluded on the basis of this study. Clinical correlation and follow-up as clinically warranted.  3.  Minimally complex 2.3 cm left ovarian cyst with a thin septation. This is likely physiologic in nature with no specific follow-up needed unless clinically warranted.  4.  Right ovary is negative.  5.  Small amount of free fluid in the pelvis. -- BOOKMARKED  5/21/25  (69) -- BOOKMARKED  8/15/23 PAP/HPV -- BOOKMARKED  5/21/25 Office Visit with OBGYN--BOOKMARKED    Clinical Assessment / Barriers to Care (Per Nurse):  Pt lives in [place], [place]    Records Location: [facility]     Records Needed:   N/A    Additional testing needed prior to consult:   N/A    Referral updates and Plan:   Referral received, chart reviewed by nursing, scheduling instructions updated, patient contacted and scheduled with Dr. Tavera at .    Madison Collins, BSN, RN, PHN, OCN  Hematology/Oncology Nurse Navigator  [facility]  [phone]

## 2025-05-27 NOTE — TELEPHONE ENCOUNTER
RECORDS STATUS - ALL OTHER DIAGNOSIS      RECORDS RECEIVED FROM: [facility]   NOTES STATUS DETAILS   OFFICE NOTE from referring provider Epic 5/21/2025 - Dr. Smita Young   MEDICATION LIST [facility]    LABS     PATHOLOGY REPORTS     ANYTHING RELATED TO DIAGNOSIS Epic 5/21/2025   IMAGING (NEED IMAGES & REPORT)     ULTRASOUND PACS US Pelvis: 5/1/2025, 9/19/2024

## 2025-06-02 NOTE — PROGRESS NOTES
Gynecologic Oncology Clinic - New Patient    Referring provider:    Smita Young DO  [address] [place] [address]    Patient: Brianna Abdalla  : 1980    Date of Visit: Pedro 3, 2025     Reason for visit: Left adnexal cyst    History of Present Illness:  Brianna Abdalla is a 44 year old patient with a left adnexal cyst and mildly elevated  level.    Patient reports that she has been having symptoms since last September.  At that time they did an ultrasound which noted this adnexal mass.  The patient reports that her periods are very heavy and painful.  Worse in the last 2 years.  She has always had painful menses.  She also notes random pain in her lower abdomen.  She has been feeling fatigue since last September as well. She has urinary frequency and feels as though there is pressure on her bladder. She has some constipation which is alternating with diarrhea. This has been ongoing for her for many years.  She feels like she may have IBS. She was prescribed OCPs 3 months ago and she ultimately reports that she felt worse on the OCP.  She feels more lethargic and more pain and pressure while on it, along with worsening urinary pressure.  She did stop the OCPs beause of this. She has monthly menses. Lasting 5 days. Bleeding is heavy. She does get spotting and nausea the week before starting her period. She has pain and bleeding with intercourse.     2024 pelvic ultrasound: Uterus 9.9 x 5.7 x 6.5 cm.  Multiple fibroids.  Endometrium 8 mm with a potential endometrial polyp measuring 1 x 0.8 x 1.2 cm distally.  Right ovary normal.  Left ovary with a 1.8 x 1.3 x 1.2 cm complex cyst with an echogenic nodule measuring 1 x 0.8 x 1.2 cm.  No free fluid.    2025 pelvic ultrasound: Uterus 10.7 x 5.4 x 6.5 cm.  2.9 x 2.4 x 2.2 cm fibroid in the mid uterus anteriorly.  Endometrium 10 mm.  Right ovary normal.  Left ovary with a 2.3 cm complex left ovarian cyst with thin septation.  Small  amount of free fluid.    2025  70    Screening History  Colonoscopy: Never  Mammogram: 2024 benign  Cervical cancer screenin/15/2023 NILM/HPV negative    OB/Gynecologic History:  Deliveries: ,  x2    Cervical cancer screening: See screening history above. Additional details:   Regular screening: Yes  History of abnormal: Yes history of LEEP in her 20s.  She has abnormal since.      Past Medical History:   Diagnosis Date    Anxiety     Depressive disorder     H/O colposcopy with cervical biopsy 2001    Negative for dysplasia       Past Surgical History:   Procedure Laterality Date    C/SECTION, LOW TRANSVERSE      x2    GYN SURGERY      LEEP TX, CERVICAL      ARNOLDO 2/3        Social History     Tobacco Use    Smoking status: Former     Types: Cigarettes     Passive exposure: Never    Smokeless tobacco: Never    Tobacco comments:     Quit in 20s, 2-3 years    Vaping Use    Vaping status: Never Used   Substance Use Topics    Alcohol use: Yes     Comment: social    Drug use: No   Current living situation: Lives in [place] with her fiance. She works as a salon owner.      Family History   Problem Relation Age of Onset    Diabetes Brother     Diabetes Maternal Grandmother     Lung Cancer Paternal Grandfather          Allergies  Allergies   Allergen Reactions    Amoxicillin     Iodine I 131 Tositumomab        Current Outpatient Medications   Medication Sig Dispense Refill    drospirenone-ethinyl estradiol (CAMI) 3-0.02 MG tablet Take 1 tablet by mouth daily. 84 tablet 1    ferrous sulfate (FE TABS) 325 (65 Fe) MG EC tablet Take 1 tablet (325 mg) by mouth every other day. 45 tablet 3    FLUoxetine (PROZAC) 20 MG capsule Take 1 capsule (20 mg) by mouth daily. 90 capsule 3    misoprostol (CYTOTEC) 200 MCG tablet Insert two tablets in the vagina and push up toward the cervix the evening prior to surgery.  Then place two new tablets in the vagina in similar fashion the following morning  which would be the day of surgery. 4 tablet 0     No current facility-administered medications for this visit.       Physical Exam:   /86   Pulse 66   Resp 16   Ht 1.524 m (5')   Wt 61.2 kg (135 lb)   LMP 05/16/2025 (Approximate)   SpO2 98%   BMI 26.37 kg/m    GENERAL: alert and no distress  EYES: Eyes grossly normal to inspection.  No discharge or erythema, or obvious scleral/conjunctival abnormalities.  RESP: No audible wheeze, cough, or visible cyanosis.    SKIN: Visible skin clear. No significant rash, abnormal pigmentation or lesions.  NEURO: Cranial nerves grossly intact.  Mentation and speech appropriate for age.  PSYCH: Appropriate affect, tone, and pace of words      Labs/Pathology:   As above    Imaging:   As above      Assessment:  Brianna Abdalla is a 44 year old patient with a left adnexal cyst and mildly elevated  level.    Plan:   Discussed the patient's imaging and lab findings in detail.  Reviewed that overall the left adnexal cyst is quite small.  Also discussed the mildly elevated  level.  Discussed that this can be elevated in premenopausal women due to any inflammation occurring in the abdomen.  Discussed possible etiologies of the mass including benign, borderline, and malignant.  Given her symptoms, I do suspect that there could be a component of endometriosis.  Reviewed that the only way to know with certainty is with surgical resection.  Discussed the option of surveillance with a repeat ultrasound and  level in 6 weeks to ensure stability or resolution versus surgical resection.  The patient would like to proceed with surveillance at this time.  She will be undergoing a hysteroscopy with polypectomy with her general gynecologist in the meantime.  Encouraged her to proceed with that.   - Follow-up with me in 6 weeks with repeat pelvic ultrasound and  level    Young Tavera MD     Gynecology Oncology Attending Attestation

## 2025-06-03 ENCOUNTER — PRE VISIT (OUTPATIENT)
Dept: ONCOLOGY | Facility: CLINIC | Age: 45
End: 2025-06-03
Payer: COMMERCIAL

## 2025-06-03 ENCOUNTER — ONCOLOGY VISIT (OUTPATIENT)
Dept: ONCOLOGY | Facility: CLINIC | Age: 45
End: 2025-06-03
Attending: OBSTETRICS & GYNECOLOGY
Payer: COMMERCIAL

## 2025-06-03 VITALS
DIASTOLIC BLOOD PRESSURE: 86 MMHG | SYSTOLIC BLOOD PRESSURE: 118 MMHG | BODY MASS INDEX: 26.5 KG/M2 | WEIGHT: 135 LBS | OXYGEN SATURATION: 98 % | HEART RATE: 66 BPM | RESPIRATION RATE: 16 BRPM | HEIGHT: 60 IN

## 2025-06-03 DIAGNOSIS — N83.202 LEFT OVARIAN CYST: ICD-10-CM

## 2025-06-03 PROCEDURE — 99204 OFFICE O/P NEW MOD 45 MIN: CPT | Performed by: OBSTETRICS & GYNECOLOGY

## 2025-06-03 PROCEDURE — 99213 OFFICE O/P EST LOW 20 MIN: CPT | Performed by: OBSTETRICS & GYNECOLOGY

## 2025-06-03 ASSESSMENT — PAIN SCALES - GENERAL: PAINLEVEL_OUTOF10: SEVERE PAIN (8)

## 2025-06-03 NOTE — LETTER
6/3/2025      Brianna Abdalla  [address] [facility] [address]      Dear Colleague,    Thank you for referring your patient, Brianna Abdalla, to the [facility]. Please see a copy of my visit note below.    Gynecologic Oncology Clinic - New Patient    Referring provider:    Smita Young DO  [address]  [place] [address]    Patient: Brianna Abdalla  : 1980    Date of Visit: Pedro 3, 2025     Reason for visit: Left adnexal cyst    History of Present Illness:  Brianna Abdalla is a 44 year old patient with a left adnexal cyst and mildly elevated  level.    Patient reports that she has been having symptoms since last September.  At that time they did an ultrasound which noted this adnexal mass.  The patient reports that her periods are very heavy and painful.  Worse in the last 2 years.  She has always had painful menses.  She also notes random pain in her lower abdomen.  She has been feeling fatigue since last September as well. She has urinary frequency and feels as though there is pressure on her bladder. She has some constipation which is alternating with diarrhea. This has been ongoing for her for many years.  She feels like she may have IBS. She was prescribed OCPs 3 months ago and she ultimately reports that she felt worse on the OCP.  She feels more lethargic and more pain and pressure while on it, along with worsening urinary pressure.  She did stop the OCPs beause of this. She has monthly menses. Lasting 5 days. Bleeding is heavy. She does get spotting and nausea the week before starting her period. She has pain and bleeding with intercourse.     2024 pelvic ultrasound: Uterus 9.9 x 5.7 x 6.5 cm.  Multiple fibroids.  Endometrium 8 mm with a potential endometrial polyp measuring 1 x 0.8 x 1.2 cm distally.  Right ovary normal.  Left ovary with a 1.8 x 1.3 x 1.2 cm complex cyst with an echogenic nodule measuring 1 x 0.8 x 1.2 cm.  No  free fluid.    2025 pelvic ultrasound: Uterus 10.7 x 5.4 x 6.5 cm.  2.9 x 2.4 x 2.2 cm fibroid in the mid uterus anteriorly.  Endometrium 10 mm.  Right ovary normal.  Left ovary with a 2.3 cm complex left ovarian cyst with thin septation.  Small amount of free fluid.    2025  70    Screening History  Colonoscopy: Never  Mammogram: 2024 benign  Cervical cancer screenin/15/2023 NILM/HPV negative    OB/Gynecologic History:  Deliveries: ,  x2    Cervical cancer screening: See screening history above. Additional details:   Regular screening: Yes  History of abnormal: Yes history of LEEP in her 20s.  She has abnormal since.      Past Medical History:   Diagnosis Date     Anxiety      Depressive disorder      H/O colposcopy with cervical biopsy 2001    Negative for dysplasia       Past Surgical History:   Procedure Laterality Date     C/SECTION, LOW TRANSVERSE      x2     GYN SURGERY       LEEP TX, CERVICAL      ARNOLDO 2/3        Social History     Tobacco Use     Smoking status: Former     Types: Cigarettes     Passive exposure: Never     Smokeless tobacco: Never     Tobacco comments:     Quit in 20s, 2-3 years    Vaping Use     Vaping status: Never Used   Substance Use Topics     Alcohol use: Yes     Comment: social     Drug use: No   Current living situation: Lives in [place] with her fiance. She works as a salon owner.      Family History   Problem Relation Age of Onset     Diabetes Brother      Diabetes Maternal Grandmother      Lung Cancer Paternal Grandfather          Allergies  Allergies   Allergen Reactions     Amoxicillin      Iodine I 131 Tositumomab        Current Outpatient Medications   Medication Sig Dispense Refill     drospirenone-ethinyl estradiol (CAMI) 3-0.02 MG tablet Take 1 tablet by mouth daily. 84 tablet 1     ferrous sulfate (FE TABS) 325 (65 Fe) MG EC tablet Take 1 tablet (325 mg) by mouth every other day. 45 tablet 3     FLUoxetine (PROZAC) 20 MG capsule  Take 1 capsule (20 mg) by mouth daily. 90 capsule 3     misoprostol (CYTOTEC) 200 MCG tablet Insert two tablets in the vagina and push up toward the cervix the evening prior to surgery.  Then place two new tablets in the vagina in similar fashion the following morning which would be the day of surgery. 4 tablet 0     No current facility-administered medications for this visit.       Physical Exam:   /86   Pulse 66   Resp 16   Ht 1.524 m (5')   Wt 61.2 kg (135 lb)   LMP 05/16/2025 (Approximate)   SpO2 98%   BMI 26.37 kg/m    GENERAL: alert and no distress  EYES: Eyes grossly normal to inspection.  No discharge or erythema, or obvious scleral/conjunctival abnormalities.  RESP: No audible wheeze, cough, or visible cyanosis.    SKIN: Visible skin clear. No significant rash, abnormal pigmentation or lesions.  NEURO: Cranial nerves grossly intact.  Mentation and speech appropriate for age.  PSYCH: Appropriate affect, tone, and pace of words      Labs/Pathology:   As above    Imaging:   As above      Assessment:  Brianna Abdalla is a 44 year old patient with a left adnexal cyst and mildly elevated  level.    Plan:   Discussed the patient's imaging and lab findings in detail.  Reviewed that overall the left adnexal cyst is quite small.  Also discussed the mildly elevated  level.  Discussed that this can be elevated in premenopausal women due to any inflammation occurring in the abdomen.  Discussed possible etiologies of the mass including benign, borderline, and malignant.  Given her symptoms, I do suspect that there could be a component of endometriosis.  Reviewed that the only way to know with certainty is with surgical resection.  Discussed the option of surveillance with a repeat ultrasound and  level in 6 weeks to ensure stability or resolution versus surgical resection.  The patient would like to proceed with surveillance at this time.  She will be undergoing a hysteroscopy with polypectomy  with her general gynecologist in the meantime.  Encouraged her to proceed with that.   - Follow-up with me in 6 weeks with repeat pelvic ultrasound and  level    Young Tavera MD     Gynecology Oncology Attending Attestation             Again, thank you for allowing me to participate in the care of your patient.        Sincerely,        Young Tavera MD    Electronically signed

## 2025-06-03 NOTE — NURSING NOTE
Oncology Rooming Note    Carmelita 3, 2025 11:14 AM   Brianna Abdalla is a 44 year old female who presents for:    Chief Complaint   Patient presents with    Oncology Clinic Visit     New patient- ovarian cyst     Initial Vitals: /86   Pulse 66   Resp 16   Ht 1.524 m (5')   Wt 61.2 kg (135 lb)   LMP 05/16/2025 (Approximate)   SpO2 98%   BMI 26.37 kg/m   Estimated body mass index is 26.37 kg/m  as calculated from the following:    Height as of this encounter: 1.524 m (5').    Weight as of this encounter: 61.2 kg (135 lb). Body surface area is 1.61 meters squared.  Severe Pain (8) Comment: intermittent   Patient's last menstrual period was 05/16/2025 (approximate).  Allergies reviewed: Yes  Medications reviewed: Yes    Medications: Medication refills not needed today.  Pharmacy name entered into [facility]: [id number] [address]    Frailty Screening:   Is the patient here for a new oncology consult visit in cancer care? 2. No    PHQ9:  Did this patient require a PHQ9?: No      Clinical concerns: ovarian cyst       Jannet Kenney LPN

## 2025-07-14 PROBLEM — N92.1 MENOMETRORRHAGIA: Status: ACTIVE | Noted: 2025-05-27

## 2025-07-14 PROBLEM — N84.0 ENDOMETRIAL POLYP: Status: ACTIVE | Noted: 2025-05-27

## 2025-07-24 ENCOUNTER — OFFICE VISIT (OUTPATIENT)
Dept: FAMILY MEDICINE | Facility: CLINIC | Age: 45
End: 2025-07-24
Payer: COMMERCIAL

## 2025-07-24 VITALS
SYSTOLIC BLOOD PRESSURE: 111 MMHG | RESPIRATION RATE: 12 BRPM | DIASTOLIC BLOOD PRESSURE: 75 MMHG | BODY MASS INDEX: 24.35 KG/M2 | HEIGHT: 61 IN | OXYGEN SATURATION: 98 % | HEART RATE: 63 BPM | TEMPERATURE: 97.8 F | WEIGHT: 129 LBS

## 2025-07-24 DIAGNOSIS — Z01.818 PREOP GENERAL PHYSICAL EXAM: Primary | ICD-10-CM

## 2025-07-24 DIAGNOSIS — N92.1 MENOMETRORRHAGIA: ICD-10-CM

## 2025-07-24 DIAGNOSIS — N84.0 ENDOMETRIAL POLYP: ICD-10-CM

## 2025-07-24 DIAGNOSIS — K59.00 CONSTIPATION, UNSPECIFIED CONSTIPATION TYPE: ICD-10-CM

## 2025-07-24 ASSESSMENT — PATIENT HEALTH QUESTIONNAIRE - PHQ9
10. IF YOU CHECKED OFF ANY PROBLEMS, HOW DIFFICULT HAVE THESE PROBLEMS MADE IT FOR YOU TO DO YOUR WORK, TAKE CARE OF THINGS AT HOME, OR GET ALONG WITH OTHER PEOPLE: NOT DIFFICULT AT ALL
SUM OF ALL RESPONSES TO PHQ QUESTIONS 1-9: 7
SUM OF ALL RESPONSES TO PHQ QUESTIONS 1-9: 7

## 2025-07-24 NOTE — PROGRESS NOTES
Preoperative Evaluation  [facility]  [address]  [place] [address]  Phone: [phone]  Primary Provider: Mikki Gonzalez PA-C  Pre-op Performing Provider: Mikki Gonzalez PA-C  Jul 24, 2025 7/21/2025   Surgical Information   What procedure is being done? hysteroscopy using Myosure, polypectomy, and dialation and curettage    Facility or Hospital where procedure/surgery will be performed: [facility]    Who is doing the procedure / surgery? Smita Norman Hector   Date of surgery / procedure: 7/29/2025   Time of surgery / procedure: 11:30 am   Where do you plan to recover after surgery? at home with family     Fax number for surgical facility: Note does not need to be faxed, will be available electronically in Epic. [phone]    Assessment & Plan     The proposed surgical procedure is considered INTERMEDIATE risk.    Preop general physical exam  Endometrial polyp  Menometrorrhagia    Constipation, unspecified constipation type  Patient reporting recent constipation after having diarrhea last week (likely related to travel, different food consumption). Encouraged high fiber diet, increased water intake and PRN miralax to ensure this issue is resolved prior to her procedure.            - No identified additional risk factors other than previously addressed    Antiplatelet or Anticoagulation Medication Instructions   - We reviewed the medication list and the patient is not on an antiplatelet or anticoagulation medications.    Additional Medication Instructions  We reviewed the medication list and there are no chronic medications that need to be adjusted for this procedure.    Recommendation  Approval given to proceed with proposed procedure, without further diagnostic evaluation.    The longitudinal plan of care for the diagnosis(es)/condition(s) as documented were addressed during this visit. Due to the added complexity in care, I will continue to  support Brianna in the subsequent management and with ongoing continuity of care.          Subjective   Brianna is a 44 year old, presenting for the following:  Pre-Op Exam          7/24/2025     7:26 AM   Additional Questions   Roomed by An V.         7/24/2025     7:26 AM   Patient Reported Additional Medications   Patient reports taking the following new medications none     HPI: Persistent vaginal bleeding, endometrial polyp. Will undergo procedure..           7/21/2025   Pre-Op Questionnaire   Have you ever had a heart attack or stroke? No   Have you ever had surgery on your heart or blood vessels, such as a stent placement, a coronary artery bypass, or surgery on an artery in your head, neck, heart, or legs? No   Do you have chest pain with activity? No   Do you have a history of heart failure? No   Do you currently have a cold, bronchitis or symptoms of other infection? No   Do you have a cough, shortness of breath, or wheezing? No   Do you or anyone in your family have previous history of blood clots? No   Do you or does anyone in your family have a serious bleeding problem such as prolonged bleeding following surgeries or cuts? No   Have you ever had problems with anemia or been told to take iron pills? (!) YES - takes iron every other day.   Have you had any abnormal blood loss such as black, tarry or bloody stools, or abnormal vaginal bleeding? (!) YES    Have you ever had a blood transfusion? No   Are you willing to have a blood transfusion if it is medically needed before, during, or after your surgery? Yes   Have you or any of your relatives ever had problems with anesthesia? No   Do you have sleep apnea, excessive snoring or daytime drowsiness? No   Do you have any artifical heart valves or other implanted medical devices like a pacemaker, defibrillator, or continuous glucose monitor? No   Do you have artificial joints? No   Are you allergic to latex? No     Advance Care Planning    Discussed advance care  planning with patient; however, patient declined at this time.    Preoperative Review of    reviewed - no record of controlled substances prescribed.          Patient Active Problem List    Diagnosis Date Noted    Menometrorrhagia 05/27/2025     Priority: Medium    Endometrial polyp 05/27/2025     Priority: Medium    Dizziness 12/16/2021     Priority: Medium    Major depressive disorder, recurrent episode, moderate (H) 01/31/2017     Priority: Medium    TONO (generalized anxiety disorder) 01/31/2017     Priority: Medium    BCC (basal cell carcinoma), face 08/23/2016     Priority: Medium    Abnormal Pap smear of cervix 01/26/2012     Priority: Medium     Had cryotherapy done 2003  4/14/15: Pap - NIL, Neg HPV. Plan cotest in 1 year. If neg, then cotest in 3 years.   8/23/16: Pap - NIL, Neg HPV. Plan cotest in 3 years.   5/23/2019 Pap: NIL/neg HR HPV. Routine screening  8/15/23 NIL pap, neg HPV        Past Medical History:   Diagnosis Date    Anxiety     Depressive disorder     H/O colposcopy with cervical biopsy 07/26/2001    Negative for dysplasia     Past Surgical History:   Procedure Laterality Date    C/SECTION, LOW TRANSVERSE      x2    GYN SURGERY      LEEP TX, CERVICAL      ARNOLDO 2/3     Current Outpatient Medications   Medication Sig Dispense Refill    drospirenone-ethinyl estradiol (CAMI) 3-0.02 MG tablet Take 1 tablet by mouth daily. 84 tablet 1    ferrous sulfate (FE TABS) 325 (65 Fe) MG EC tablet Take 1 tablet (325 mg) by mouth every other day. 45 tablet 3    FLUoxetine (PROZAC) 20 MG capsule Take 1 capsule (20 mg) by mouth daily. 90 capsule 3    misoprostol (CYTOTEC) 200 MCG tablet Insert two tablets in the vagina and push up toward the cervix the evening prior to surgery.  Then place two new tablets in the vagina in similar fashion the following morning which would be the day of surgery. 4 tablet 0       Allergies   Allergen Reactions    Amoxicillin     Iodine I-131 Tositumomab         Social History  "    Tobacco Use    Smoking status: Former     Types: Cigarettes     Passive exposure: Never    Smokeless tobacco: Never    Tobacco comments:     Quit in 20s, 2-3 years    Substance Use Topics    Alcohol use: Yes     Comment: social       History   Drug Use No             Review of Systems  Constitutional, neuro, ENT, endocrine, pulmonary, cardiac, gastrointestinal, genitourinary, musculoskeletal, integument and psychiatric systems are negative, except as otherwise noted.    Objective    /75   Pulse 63   Temp 97.8  F (36.6  C) (Temporal)   Resp 12   Ht 1.555 m (5' 1.22\")   Wt 58.5 kg (129 lb)   LMP 07/16/2025 (Approximate)   SpO2 98%   BMI 24.20 kg/m     Estimated body mass index is 24.2 kg/m  as calculated from the following:    Height as of this encounter: 1.555 m (5' 1.22\").    Weight as of this encounter: 58.5 kg (129 lb).  Physical Exam  GENERAL: alert and no distress  EYES: Eyes grossly normal to inspection, PERRL and conjunctivae and sclerae normal  HENT: ear canals and TM's normal, nose and mouth without ulcers or lesions  NECK: no adenopathy, no asymmetry, masses, or scars  RESP: lungs clear to auscultation - no rales, rhonchi or wheezes  CV: regular rate and rhythm, normal S1 S2, no S3 or S4, no murmur, click or rub, no peripheral edema  ABDOMEN: soft, nontender, no hepatosplenomegaly, no masses and bowel sounds normal  MS: no gross musculoskeletal defects noted, no edema  SKIN: no suspicious lesions or rashes  NEURO: Normal strength and tone, mentation intact and speech normal  PSYCH: mentation appears normal, affect normal/bright    Recent Labs   Lab Test 04/24/25  1245 09/12/24  1422   HGB 12.8 12.4    180    140   POTASSIUM 4.0 4.0   CR 0.79 0.81   A1C  --  5.2        Diagnostics  No labs were ordered during this visit.   No EKG required, no history of coronary heart disease, significant arrhythmia, peripheral arterial disease or other structural heart disease.    Revised " Cardiac Risk Index (RCRI)  The patient has the following serious cardiovascular risks for perioperative complications:   - No serious cardiac risks = 0 points     RCRI Interpretation: 0 points: Class I (very low risk - 0.4% complication rate)         Signed Electronically by: Mikki Gonzalez PA-C  A copy of this evaluation report is provided to the requesting physician.        Answers submitted by the patient for this visit:  Patient Health Questionnaire (Submitted on 7/24/2025)  If you checked off any problems, how difficult have these problems made it for you to do your work, take care of things at home, or get along with other people?: Not difficult at all  PHQ9 TOTAL SCORE: 7

## 2025-07-24 NOTE — PATIENT INSTRUCTIONS
Patient Education   Preparing for Your Surgery  For Adults  Getting started  In most cases, a nurse will call to review your health history and instructions. They will give you an arrival time based on your scheduled surgery time. Please be ready to share:  Your doctor's clinic name and phone number  Your medical, surgical, and anesthesia history  A list of allergies and sensitivities  A list of medicines, including herbal treatments and over-the-counter drugs  Whether the patient has a legal guardian (ask how to send us the papers in advance)  Note: You may not receive a call if you were seen at our PAC (Preoperative Assessment Center).  Please tell us if you're pregnant--or if there's any chance you might be pregnant. Some surgeries may injure a fetus (unborn baby), so they require a pregnancy test. Surgeries that are safe for a fetus don't always need a test, and you can choose whether to have one.   Preparing for surgery  Within 10 to 30 days of surgery: Have a pre-op exam (sometimes called an H&P, or History and Physical). This can be done at a clinic or pre-operative center.  If you're having a , you may not need this exam. Talk to your care team.  At your pre-op exam, talk to your care team about all medicines you take. (This includes CBD oil and any drugs, such as THC, marijuana, and other forms of cannabis.) If you need to stop any medicine before surgery, ask when to start taking it again.  This is for your safety. Many medicines and drugs can make you bleed too much during surgery. Some change how well surgery (anesthesia) drugs work.  Call your insurance company to let them know you're having surgery. (If you don't have insurance, call [phone].)  Call your clinic if there's any change in your health. This includes a scrape or scratch near the surgery site, or any signs of a cold (sore throat, runny nose, cough, rash, fever).  Eating and drinking guidelines  For your safety: Unless your  surgeon tells you otherwise, follow the guidelines below.  Eat and drink as normal until 8 hours before you arrive for surgery. After that, no food or milk. You can spit out gum when you arrive.  Drink clear liquids until 2 hours before you arrive. These are liquids you can see through, like water, Gatorade, and Propel Water. They also include plain black coffee and tea (no cream or milk).  No alcohol for 24 hours before you arrive. The night before surgery, stop any drinks that contain THC.  If your care team tells you to take medicine on the morning of surgery, it's okay to take it with a sip of water. No other medicines or drugs are allowed (including CBD oil)--follow your care team's instructions.  If you have questions the day of surgery, call your hospital or surgery center.   Preventing infection  Shower or bathe the night before and the morning of surgery. Follow the instructions your clinic gave you. (If no instructions, use regular soap.)  Don't shave or clip hair near your surgery site. We'll remove the hair if needed.  Don't smoke or vape the morning of surgery. No chewing tobacco for 6 hours before you arrive. A nicotine patch is okay. You may spit out nicotine gum when you arrive.  For some surgeries, the surgeon will tell you to fully quit smoking and nicotine.  We will make every effort to keep you safe from infection. We will:  Clean our hands often with soap and water (or an alcohol-based hand rub).  Clean the skin at your surgery site with a special soap that kills germs.  Give you a special gown to keep you warm. (Cold raises the risk of infection.)  Wear hair covers, masks, gowns, and gloves during surgery.  Give antibiotic medicine, if prescribed. Not all surgeries need this medicine.  What to bring on the day of surgery  Photo ID and insurance card  Copy of your health care directive, if you have one  Glasses and hearing aids (bring cases)  You can't wear contacts during surgery  Inhaler and  eye drops, if you use them (tell us about these when you arrive)  CPAP machine or breathing device, if you use them  A few personal items, if spending the night  If you have . . .  A pacemaker, ICD (cardiac defibrillator), or other implant: Bring the ID card.  An implanted stimulator: Bring the remote control.  A legal guardian: Bring a copy of the certified (court-stamped) guardianship papers.  Please remove any jewelry, including body piercings. Leave jewelry and other valuables at home.  If you're going home the day of surgery  You must have a support person drive you home. They should stay with you overnight, and they may need to help with your self-care.  If you don't have a support person, please tells us as soon as possible. We can help.  After surgery  If it's hard to control your pain or you need more pain medicine, please call your surgeon's office.  Questions?   If you have any questions for your care team, list them here:   ____________________________________________________________________________________________________________________________________________________________________________________________________________________________________________________________  For informational purposes only. Not to replace the advice of your health care provider. Copyright   2003, 2019 [employer]. All rights reserved. Clinically reviewed by Lazarus Booker MD. [employer] [id number] - REV 02/25.

## 2025-07-25 ENCOUNTER — ANESTHESIA EVENT (OUTPATIENT)
Dept: SURGERY | Facility: AMBULATORY SURGERY CENTER | Age: 45
End: 2025-07-25
Payer: COMMERCIAL

## 2025-07-29 ENCOUNTER — HOSPITAL ENCOUNTER (OUTPATIENT)
Facility: AMBULATORY SURGERY CENTER | Age: 45
Discharge: HOME | End: 2025-07-29
Attending: OBSTETRICS & GYNECOLOGY | Admitting: OBSTETRICS & GYNECOLOGY
Payer: COMMERCIAL

## 2025-07-29 ENCOUNTER — NURSE TRIAGE (OUTPATIENT)
Dept: NURSING | Facility: CLINIC | Age: 45
End: 2025-07-29
Payer: COMMERCIAL

## 2025-07-29 ENCOUNTER — ANESTHESIA (OUTPATIENT)
Dept: SURGERY | Facility: AMBULATORY SURGERY CENTER | Age: 45
End: 2025-07-29
Payer: COMMERCIAL

## 2025-07-29 VITALS
SYSTOLIC BLOOD PRESSURE: 112 MMHG | RESPIRATION RATE: 16 BRPM | HEART RATE: 67 BPM | OXYGEN SATURATION: 100 % | DIASTOLIC BLOOD PRESSURE: 76 MMHG | TEMPERATURE: 97.1 F

## 2025-07-29 LAB — HCG UR QL: NEGATIVE

## 2025-07-29 PROCEDURE — 81025 URINE PREGNANCY TEST: CPT | Performed by: OBSTETRICS & GYNECOLOGY

## 2025-07-29 RX ORDER — ONDANSETRON 4 MG/1
4 TABLET, ORALLY DISINTEGRATING ORAL EVERY 30 MIN PRN
Status: DISCONTINUED | OUTPATIENT
Start: 2025-07-29 | End: 2025-07-30 | Stop reason: HOSPADM

## 2025-07-29 RX ORDER — ONDANSETRON 2 MG/ML
4 INJECTION INTRAMUSCULAR; INTRAVENOUS EVERY 30 MIN PRN
Status: DISCONTINUED | OUTPATIENT
Start: 2025-07-29 | End: 2025-07-30 | Stop reason: HOSPADM

## 2025-07-29 RX ORDER — LIDOCAINE 40 MG/G
CREAM TOPICAL
Status: DISCONTINUED | OUTPATIENT
Start: 2025-07-29 | End: 2025-07-30 | Stop reason: HOSPADM

## 2025-07-29 RX ORDER — FENTANYL CITRATE 50 UG/ML
50 INJECTION, SOLUTION INTRAMUSCULAR; INTRAVENOUS EVERY 5 MIN PRN
Status: DISCONTINUED | OUTPATIENT
Start: 2025-07-29 | End: 2025-07-30 | Stop reason: HOSPADM

## 2025-07-29 RX ORDER — OXYCODONE HYDROCHLORIDE 5 MG/1
10 TABLET ORAL
Status: DISCONTINUED | OUTPATIENT
Start: 2025-07-29 | End: 2025-07-30 | Stop reason: HOSPADM

## 2025-07-29 RX ORDER — SODIUM CHLORIDE, SODIUM LACTATE, POTASSIUM CHLORIDE, CALCIUM CHLORIDE 600; 310; 30; 20 MG/100ML; MG/100ML; MG/100ML; MG/100ML
INJECTION, SOLUTION INTRAVENOUS CONTINUOUS
Status: DISCONTINUED | OUTPATIENT
Start: 2025-07-29 | End: 2025-07-30 | Stop reason: HOSPADM

## 2025-07-29 RX ORDER — FENTANYL CITRATE 50 UG/ML
25 INJECTION, SOLUTION INTRAMUSCULAR; INTRAVENOUS
Status: DISCONTINUED | OUTPATIENT
Start: 2025-07-29 | End: 2025-07-30 | Stop reason: HOSPADM

## 2025-07-29 RX ORDER — ACETAMINOPHEN 325 MG/1
975 TABLET ORAL ONCE
Status: COMPLETED | OUTPATIENT
Start: 2025-07-29 | End: 2025-07-29

## 2025-07-29 RX ORDER — NALOXONE HYDROCHLORIDE 0.4 MG/ML
0.1 INJECTION, SOLUTION INTRAMUSCULAR; INTRAVENOUS; SUBCUTANEOUS
Status: DISCONTINUED | OUTPATIENT
Start: 2025-07-29 | End: 2025-07-30 | Stop reason: HOSPADM

## 2025-07-29 RX ORDER — PROPOFOL 10 MG/ML
INJECTION, EMULSION INTRAVENOUS CONTINUOUS PRN
Status: DISCONTINUED | OUTPATIENT
Start: 2025-07-29 | End: 2025-07-29

## 2025-07-29 RX ORDER — FENTANYL CITRATE 50 UG/ML
25 INJECTION, SOLUTION INTRAMUSCULAR; INTRAVENOUS EVERY 5 MIN PRN
Status: DISCONTINUED | OUTPATIENT
Start: 2025-07-29 | End: 2025-07-30 | Stop reason: HOSPADM

## 2025-07-29 RX ORDER — FENTANYL CITRATE 50 UG/ML
INJECTION, SOLUTION INTRAMUSCULAR; INTRAVENOUS PRN
Status: DISCONTINUED | OUTPATIENT
Start: 2025-07-29 | End: 2025-07-29

## 2025-07-29 RX ORDER — LIDOCAINE HYDROCHLORIDE 20 MG/ML
INJECTION, SOLUTION INFILTRATION; PERINEURAL PRN
Status: DISCONTINUED | OUTPATIENT
Start: 2025-07-29 | End: 2025-07-29

## 2025-07-29 RX ORDER — OXYCODONE HYDROCHLORIDE 5 MG/1
5 TABLET ORAL
Status: DISCONTINUED | OUTPATIENT
Start: 2025-07-29 | End: 2025-07-30 | Stop reason: HOSPADM

## 2025-07-29 RX ORDER — KETOROLAC TROMETHAMINE 30 MG/ML
30 INJECTION, SOLUTION INTRAMUSCULAR; INTRAVENOUS ONCE
Status: COMPLETED | OUTPATIENT
Start: 2025-07-29 | End: 2025-07-29

## 2025-07-29 RX ORDER — DEXAMETHASONE SODIUM PHOSPHATE 4 MG/ML
4 INJECTION, SOLUTION INTRA-ARTICULAR; INTRALESIONAL; INTRAMUSCULAR; INTRAVENOUS; SOFT TISSUE
Status: DISCONTINUED | OUTPATIENT
Start: 2025-07-29 | End: 2025-07-30 | Stop reason: HOSPADM

## 2025-07-29 RX ADMIN — KETOROLAC TROMETHAMINE 30 MG: 30 INJECTION, SOLUTION INTRAMUSCULAR; INTRAVENOUS at 10:30

## 2025-07-29 RX ADMIN — PROPOFOL 150 MCG/KG/MIN: 10 INJECTION, EMULSION INTRAVENOUS at 10:43

## 2025-07-29 RX ADMIN — FENTANYL CITRATE 25 MCG: 50 INJECTION, SOLUTION INTRAMUSCULAR; INTRAVENOUS at 10:43

## 2025-07-29 RX ADMIN — LIDOCAINE HYDROCHLORIDE 60 MG: 20 INJECTION, SOLUTION INFILTRATION; PERINEURAL at 10:43

## 2025-07-29 RX ADMIN — SODIUM CHLORIDE, SODIUM LACTATE, POTASSIUM CHLORIDE, CALCIUM CHLORIDE: 600; 310; 30; 20 INJECTION, SOLUTION INTRAVENOUS at 10:32

## 2025-07-29 RX ADMIN — ACETAMINOPHEN 975 MG: 325 TABLET ORAL at 10:25

## 2025-07-29 RX ADMIN — SODIUM CHLORIDE, SODIUM LACTATE, POTASSIUM CHLORIDE, CALCIUM CHLORIDE: 600; 310; 30; 20 INJECTION, SOLUTION INTRAVENOUS at 11:06

## 2025-07-29 NOTE — H&P
I have reviewed this patient's preop H&P and no interval changes are needed.  The patient feels that preoperative use of the intravaginal Cytotec caused her uterine cramping and vaginal bleeding but these are both expected side effects of this medication.  Her UPT result is pending and informed consent has been reviewed and obtained for the listed procedures.  She also consented to a blood transfusion if emergently necessary.

## 2025-07-29 NOTE — DISCHARGE INSTRUCTIONS
Nothing per vagina x 2 weeks including no intercourse, douching, or tampon use.  Also, no swimming or lake activities x 2 weeks.  If you experience heavy vaginal bleeding where you are having to change a pad every hour or less, then please go to the ED.  Also, if you are experiencing severe pelvic pain where you are doubled over and/or a fever (and your temp is over 100.4), then go to the ED.  Mild-moderate bleeding is normal and may last several days to 2 weeks.        Tylenol 975 mg was given at 10:25am.      You should not take more then 4,000 mg of tylenol/acetaminophen in a 24 hour period.    Ibuprofen can be given at 4:30pm if needed

## 2025-07-29 NOTE — ANESTHESIA CARE TRANSFER NOTE
Patient: Brianna Abdalla    Procedure: Procedure(s):  Exam under anesthesia, operative hysteroscopy using Myosure, polypectomy, and dialation and curettage       Diagnosis: Menometrorrhagia [N92.1]  Endometrial polyp [N84.0]  Diagnosis Additional Information: No value filed.    Anesthesia Type:   MAC     Note:    Oropharynx: oropharynx clear of all foreign objects  Level of Consciousness: drowsy  Oxygen Supplementation: face mask  Level of Supplemental Oxygen (L/min / FiO2): 6  Independent Airway: airway patency satisfactory and stable  Dentition: dentition unchanged  Vital Signs Stable: post-procedure vital signs reviewed and stable  Report to RN Given: handoff report given  Patient transferred to: PACU    Handoff Report: Identifed the Patient, Identified the Reponsible Provider, Reviewed the pertinent medical history, Discussed the surgical course, Reviewed Intra-OP anesthesia mangement and issues during anesthesia, Set expectations for post-procedure period and Allowed opportunity for questions and acknowledgement of understanding    Vitals:  Vitals Value Taken Time   BP     Temp     Pulse     Resp     SpO2         Electronically Signed By: JACKIE Barr CRNA  July 29, 2025  11:17 AM

## 2025-07-29 NOTE — DISCHARGE SUMMARY
Physician Discharge Summary     Patient ID:  Brianna Abdalla  [id number]  44 year old  1980    Admit date: 7/29/2025    Discharge date and time: 7/29/2025     Admitting Physician: Smita Young DO     Discharge Physician: Same    Admission Diagnoses: Menometrorrhagia [N92.1]  Submucosal fibroid  Endometrial polyp [N84.0]    Discharge Diagnoses: Same  Submucosal fibroid  2 endometrial polyps    Admission Condition: good    Discharged Condition: good    Indication for Admission: none    Hospital Course: not applicable    Consults: none    Significant Diagnostic Studies: none    Treatments: surgery: Exam under anesthesia, operative hysteroscopy using the MyoSure Reach, myomectomy, polypectomy, and dilatation and curettage    Discharge Exam: none    Disposition: home    Patient Instructions:   Patient's Medications   New Prescriptions    No medications on file   Previous Medications    DROSPIRENONE-ETHINYL ESTRADIOL (CAMI) 3-0.02 MG TABLET    Take 1 tablet by mouth daily.    FERROUS SULFATE (FE TABS) 325 (65 FE) MG EC TABLET    Take 1 tablet (325 mg) by mouth every other day.    FLUOXETINE (PROZAC) 20 MG CAPSULE    Take 1 capsule (20 mg) by mouth daily.   Modified Medications    No medications on file   Discontinued Medications    MISOPROSTOL (CYTOTEC) 200 MCG TABLET    Insert two tablets in the vagina and push up toward the cervix the evening prior to surgery.  Then place two new tablets in the vagina in similar fashion the following morning which would be the day of surgery.     Activity: activity as tolerated and no driving for today  Diet: regular diet  Wound Care: keep wound clean and dry    Follow-up with Dr. Young in 2 weeks at the [facility] in [place].    Signed:  Smita Young DO  FACOG, FACS  7/29/2025  11:30 AM

## 2025-07-29 NOTE — ANESTHESIA PREPROCEDURE EVALUATION
Anesthesia Pre-Procedure Evaluation    Patient: Brianna Abdalla   MRN: [id number] : 1980          Procedure : Procedure(s):  Exam under anesthesia, operative hysteroscopy using Myosure, polypectomy, and dialation and curettage         Past Medical History:   Diagnosis Date     Anxiety      Depressive disorder      H/O colposcopy with cervical biopsy 2001    Negative for dysplasia      Past Surgical History:   Procedure Laterality Date     C/SECTION, LOW TRANSVERSE      x2     GYN SURGERY       LEEP TX, CERVICAL      ARNOLDO 2/3      Allergies   Allergen Reactions     Amoxicillin      Iodine I-131 Tositumomab       Social History     Tobacco Use     Smoking status: Former     Types: Cigarettes     Passive exposure: Never     Smokeless tobacco: Never     Tobacco comments:     Quit in 20s, 2-3 years    Substance Use Topics     Alcohol use: Yes     Comment: social      Wt Readings from Last 1 Encounters:   25 58.5 kg (129 lb)        Anesthesia Evaluation   Pt has had prior anesthetic.     No history of anesthetic complications       ROS/MED HX  ENT/Pulmonary:  - neg pulmonary ROS     Neurologic:  - neg neurologic ROS     Cardiovascular:  - neg cardiovascular ROS     METS/Exercise Tolerance: >4 METS    Hematologic:  - neg hematologic  ROS     Musculoskeletal:  - neg musculoskeletal ROS     GI/Hepatic:  - neg GI/hepatic ROS     Renal/Genitourinary:  - neg Renal ROS     Endo:  - neg endo ROS     Psychiatric/Substance Use:  - neg psychiatric ROS     Infectious Disease:  - neg infectious disease ROS     Malignancy:  - neg malignancy ROS     Other:  - neg other ROS            Physical Exam  Airway  Mallampati: II  TM distance: >3 FB  Neck ROM: full  Mouth opening: >= 4 cm    Cardiovascular - normal exam  Rhythm: regular  Rate: normal rate     Dental   (+) Completely normal teeth      Pulmonary - normal examBreath sounds clear to auscultation        Neurological - normal exam  She appears awake, alert and  "oriented x3.    Other Findings       OUTSIDE LABS:  CBC:   Lab Results   Component Value Date    WBC 3.7 (L) 04/24/2025    WBC 3.8 (L) 09/12/2024    HGB 12.8 04/24/2025    HGB 12.4 09/12/2024    HCT 37.8 04/24/2025    HCT 36.9 09/12/2024     04/24/2025     09/12/2024     BMP:   Lab Results   Component Value Date     04/24/2025     09/12/2024    POTASSIUM 4.0 04/24/2025    POTASSIUM 4.0 09/12/2024    CHLORIDE 106 04/24/2025    CHLORIDE 105 09/12/2024    CO2 23 04/24/2025    CO2 26 09/12/2024    BUN 13.0 04/24/2025    BUN 8.9 09/12/2024    CR 0.79 04/24/2025    CR 0.81 09/12/2024    GLC 91 04/24/2025    GLC 84 09/12/2024     COAGS: No results found for: \"PTT\", \"INR\", \"FIBR\"  POC:   Lab Results   Component Value Date    HCG Negative 05/11/2011     HEPATIC:   Lab Results   Component Value Date    ALBUMIN 4.3 04/24/2025    PROTTOTAL 6.7 04/24/2025    ALT 20 04/24/2025    AST 25 04/24/2025    ALKPHOS 57 04/24/2025    BILITOTAL 0.3 04/24/2025     OTHER:   Lab Results   Component Value Date    A1C 5.2 09/12/2024    KIKA 9.3 04/24/2025    TSH 2.11 12/16/2021    CRP <2.9 10/24/2019    SED 9 04/24/2025       Anesthesia Plan    ASA Status:  2      NPO Status: NPO Appropriate   Anesthesia Type: MAC.  Airway: natural airway.  Induction: intravenous.  Maintenance: TIVA.   Techniques and Equipment:       - Monitoring Plan: standard ASA monitoring     Consents    Anesthesia Plan(s) and associated risks, benefits, and realistic alternatives discussed. Questions answered and patient/representative(s) expressed understanding.     - Discussed: anesthesiologist     - Discussed with:  Patient               Postoperative Care    Pain management: non-narcotic analgesics, plan for postoperative opioid use, multimodal analgesia.     Comments:                 John Stevens MD    I have reviewed the pertinent notes and labs in the chart from the past 30 days and (re)examined the patient.  Any updates or changes from " those notes are reflected in this note.    Clinically Significant Risk Factors Present on Admission

## 2025-07-29 NOTE — OP NOTE
"Preoperative diagnosis:  Menometrorrhagia, submucosal fibroid per recent US on 2025, endometrial polyp per US on 2024  Postoperative diagnosis:  Same, one submucosal fibroid, two endometrial polyps  Surgery:  Exam under anesthesia, operative hysteroscopy using the MyoSure Reach, myomectomy, polypectomy, and dilatation and curettage  Surgeon:  Smita Young DO    First assist:  [facility] OR staff  Anesthesia:  MAC  Pathology:  Submitted tissue specimens include submucosal fibroid, endometrial polyps, and endometrial curettings  EBL:  10 mL  Fluid deficit:  180 mL  Complications:  None  Implants/grafts:  None  Counts:  Correct    This 43 y/o female, , LMP 2025, has been followed at [facility] for the issue of menometrorrhagia despite use of Марина which she recently discontinued.  A pelvic US on 2025 demonstrated a 2.9 cm submucosal fibroid and a previous US on 2024 showed a 1.2 cm endometrial polyp so removal was advised and informed consent was reviewed and obtained.  She also consented to a blood transfusion if emergently necessary.    The patient was taken to the OR and was placed under MAC.  In the lithotomy position, a pelvic exam was performed and verified an anteverted parous-sized uterus with no palpable adnexal mass.  She was then prepped and draped in sterile manner and the \"pause for the cause\" identified the correct patient and procedures.    A bi-valve speculum was placed and her cervix was seen in its entirety.  The 12 o'clock position of her cervix was grasped with a single-toothed tenaculum and the internal cervical os was dilated using serial Hegars.  The MyoSure hysteroscope was then inserted without difficulty and her endometrium was inspected and the two tubal ostia could not be visualized due to the presence of a large submucosal fibroid and two endometrial polyps.  Next, the MyoSure Reach was used to perform the myomectomy and polypectomy and after removal of " these three growths the two tubal ostia could then be seen.  No other growths were found so the hysteroscope was removed and sharp curettage was performed of the endometrium with scant tissue obtained.  All tissue was submitted to pathology and the instruments were removed.  Counts were correct and there were no complications.  EBL was 10 mL and the fluid deficit was 180 mL.  She was taken to PACU in excellent condition.    Smita Young DO FACOG, FACS

## 2025-07-29 NOTE — TELEPHONE ENCOUNTER
Triage call  Patient calling  she is having a hysteroscopy Clotting hiwot to quarter size clots cramping lower back pain 6/10 for pain.  The bleeding seems to be only when she urinates.  Paged the on call Dr Staton she recommended that she go to the ED if symptomatic but otherwise she can wait till she is seen at the procedure.  She said she did not need to take the second dose of the  misoprostol  this morning. Called patient back with the information    Per protocol see HCP with in 4 hours or PCP triage. Care advice given.  Verbalizes understanding and agrees with plan. Called patient to give her the information.    Elodia Anderson RN   [facility] Nurse Advisor  4:33 AM 7/29/2025    Reason for Disposition   [1] Constant abdominal pain AND [2] present > 2 hours    Additional Information   Negative: Shock suspected (e.g., cold/pale/clammy skin, too weak to stand, low BP, rapid pulse)   Negative: Difficult to awaken or acting confused (e.g., disoriented, slurred speech)   Negative: Passed out (i.e., lost consciousness, collapsed and was not responding)   Negative: Sounds like a life-threatening emergency to the triager   Negative: Followed a genital area injury (e.g., vagina, vulva)   Negative: Pregnant 20 or more weeks   Negative: Pregnant < 20 weeks  (less than 5 months)   Negative: Postpartum (from 0 to 6 weeks after delivery)   Negative: Bleeding occurring > 12 months after menopause   Negative: Bleeding from sexual abuse or rape   Negative: [1] Vaginal discharge is main symptom AND [2] small amount of blood   Negative: SEVERE abdominal pain   Negative: SEVERE dizziness (e.g., unable to stand, requires support to walk, feels like passing out now)   Negative: SEVERE vaginal bleeding (e.g., soaking 2 pads or tampons per hour and present 2 or more hours; 1 menstrual cup every 2 hours)   Negative: Patient sounds very sick or weak to the triager   Negative: MODERATE vaginal bleeding (e.g., soaking 1 pad or  tampon per hour and present > 6 hours; 1 menstrual cup every 6 hours)    Protocols used: Vaginal Bleeding - [id number]

## 2025-07-29 NOTE — BRIEF OP NOTE
[facility] Brief Operative Note    Pre-operative diagnosis: Menometrorrhagia [N92.1]  Submucosal fibroid  Endometrial polyp [N84.0]   Post-operative diagnosis Same  Submucosal fibroid  Two endometrial polyps   Procedures: Exam under anesthesia, operative hysteroscopy using MyoSure Reach, myomectomy, polypectomy, and dilatation and curettage   Surgeon(s): Smita Young DO   Estimated blood loss:  Fluid deficit: 10 mL   180 mL   Specimens: ID Type Source Tests Collected by Time Destination   1 : endometrial polyps, submucosal fibroid, endometrial currettings Tissue Endometrium SURGICAL PATHOLOGY EXAM Smita Young DO 7/29/2025 10:59 AM       Findings:    Smita Young DO  FACOG, FACS 1 submucosal fibroid and 2 endometrial polyps, endometrial curettings

## 2025-07-29 NOTE — ANESTHESIA POSTPROCEDURE EVALUATION
Patient: Brianna Abdalla    Procedure: Procedure(s):  Exam under anesthesia, operative hysteroscopy using Myosure, polypectomy, and dialation and curettage       Anesthesia Type:  MAC    Note:  Disposition: Outpatient   Postop Pain Control: Uneventful            Sign Out: Well controlled pain   PONV: No   Neuro/Psych: Uneventful            Sign Out: Acceptable/Baseline neuro status   Airway/Respiratory: Uneventful            Sign Out: Acceptable/Baseline resp. status   CV/Hemodynamics: Uneventful            Sign Out: Acceptable CV status; No obvious hypovolemia; No obvious fluid overload   Other NRE: NONE   DID A NON-ROUTINE EVENT OCCUR? No           Last vitals:  Vitals Value Taken Time   /76 07/29/25 11:30   Temp 97.1  F (36.2  C) 07/29/25 11:18   Pulse 67 07/29/25 11:30   Resp 16 07/29/25 11:18   SpO2 100 % 07/29/25 11:45       Electronically Signed By: John Stevens MD  July 29, 2025  12:59 PM

## 2025-08-13 ENCOUNTER — PATIENT OUTREACH (OUTPATIENT)
Dept: CARE COORDINATION | Facility: CLINIC | Age: 45
End: 2025-08-13
Payer: COMMERCIAL

## 2025-08-27 ENCOUNTER — PATIENT OUTREACH (OUTPATIENT)
Dept: CARE COORDINATION | Facility: CLINIC | Age: 45
End: 2025-08-27
Payer: COMMERCIAL

## (undated) DEVICE — SOL NACL 0.9% INJ 1000ML BAG 07983-09

## (undated) DEVICE — GLOVE BIOGEL PI MICRO SZ 6.5 48565

## (undated) DEVICE — Device

## (undated) DEVICE — MG MINOR GYN PACK SMACNMGMGA

## (undated) DEVICE — SEAL SET MYOSURE ROD LENS SCOPE SINGLE USE 40-902

## (undated) DEVICE — PAD PERI W/WINGS 1580A

## (undated) DEVICE — SOL WATER IRRIG 1000ML BOTTLE 07139-09

## (undated) DEVICE — KIT PROCEDURE FLUENT IN/OUT FLOWPAK TISS TRAP FLT-112S

## (undated) RX ORDER — KETOROLAC TROMETHAMINE 30 MG/ML
INJECTION, SOLUTION INTRAMUSCULAR; INTRAVENOUS
Status: DISPENSED
Start: 2025-07-29

## (undated) RX ORDER — PROPOFOL 10 MG/ML
INJECTION, EMULSION INTRAVENOUS
Status: DISPENSED
Start: 2025-07-29

## (undated) RX ORDER — ACETAMINOPHEN 325 MG/1
TABLET ORAL
Status: DISPENSED
Start: 2025-07-29

## (undated) RX ORDER — FENTANYL CITRATE 50 UG/ML
INJECTION, SOLUTION INTRAMUSCULAR; INTRAVENOUS
Status: DISPENSED
Start: 2025-07-29